# Patient Record
Sex: MALE | Race: WHITE | NOT HISPANIC OR LATINO | Employment: OTHER | URBAN - METROPOLITAN AREA
[De-identification: names, ages, dates, MRNs, and addresses within clinical notes are randomized per-mention and may not be internally consistent; named-entity substitution may affect disease eponyms.]

---

## 2017-03-13 RX ORDER — ALBUTEROL SULFATE 90 UG/1
2 AEROSOL, METERED RESPIRATORY (INHALATION) EVERY 4 HOURS PRN
COMMUNITY

## 2017-03-13 RX ORDER — BUDESONIDE AND FORMOTEROL FUMARATE DIHYDRATE 160; 4.5 UG/1; UG/1
2 AEROSOL RESPIRATORY (INHALATION)
COMMUNITY

## 2017-03-13 RX ORDER — DILTIAZEM HYDROCHLORIDE 120 MG/1
360 CAPSULE, EXTENDED RELEASE ORAL DAILY
COMMUNITY
End: 2017-03-14 | Stop reason: ALTCHOICE

## 2017-03-13 RX ORDER — LISINOPRIL 40 MG/1
40 TABLET ORAL DAILY
COMMUNITY
End: 2020-03-20 | Stop reason: HOSPADM

## 2017-03-14 ENCOUNTER — OFFICE VISIT (OUTPATIENT)
Dept: CARDIOLOGY | Facility: CLINIC | Age: 67
End: 2017-03-14

## 2017-03-14 VITALS
WEIGHT: 233 LBS | HEIGHT: 69 IN | SYSTOLIC BLOOD PRESSURE: 160 MMHG | HEART RATE: 97 BPM | BODY MASS INDEX: 34.51 KG/M2 | DIASTOLIC BLOOD PRESSURE: 72 MMHG

## 2017-03-14 DIAGNOSIS — I35.0 NONRHEUMATIC AORTIC VALVE STENOSIS: Primary | ICD-10-CM

## 2017-03-14 DIAGNOSIS — R07.2 PRECORDIAL PAIN: ICD-10-CM

## 2017-03-14 DIAGNOSIS — E78.5 HYPERLIPIDEMIA, UNSPECIFIED HYPERLIPIDEMIA TYPE: ICD-10-CM

## 2017-03-14 DIAGNOSIS — I10 ESSENTIAL HYPERTENSION: ICD-10-CM

## 2017-03-14 PROCEDURE — 99203 OFFICE O/P NEW LOW 30 MIN: CPT | Performed by: INTERNAL MEDICINE

## 2017-03-14 PROCEDURE — 93000 ELECTROCARDIOGRAM COMPLETE: CPT | Performed by: INTERNAL MEDICINE

## 2017-03-14 RX ORDER — AMLODIPINE BESYLATE 5 MG/1
5 TABLET ORAL DAILY
Qty: 30 TABLET | Refills: 11 | Status: SHIPPED | OUTPATIENT
Start: 2017-03-14 | End: 2020-03-20 | Stop reason: HOSPADM

## 2017-03-14 RX ORDER — FUROSEMIDE 40 MG/1
40 TABLET ORAL DAILY
COMMUNITY
End: 2020-03-25 | Stop reason: SDUPTHER

## 2017-03-14 RX ORDER — METOPROLOL SUCCINATE 25 MG/1
25 TABLET, EXTENDED RELEASE ORAL DAILY
COMMUNITY
End: 2018-05-21 | Stop reason: ALTCHOICE

## 2017-03-14 NOTE — PROGRESS NOTES
" Subjective:        CC  Chest soreness right sided    Sob on mimnimum exerrtion     Fili Ladd is a 66 y.o. male who  is here for the cardiac evaluation has been complaining of the chest tightness right-sided, also complaining of shortness of breath on minimum exertion, is known to have the aortic stenosis, Cardiac risk factors: advanced age (older than 55 for men, 65 for women), diabetes mellitus, dyslipidemia, family history of premature cardiovascular disease, hypertension and obesity (BMI >= 30 kg/m2).    Patient also known to have a benign arterial hypertension in the blood pressure does not seems to be well-controlled    Past Medical History   Diagnosis Date   • Hyperlipidemia    • Hypertension    • Valvular disease     No family history on file.     Review of Systems  Constitutional: No wt loss, fever   Gastrointestinal: No nausea , abdominal pain  Behavioral/Psych: No insomnia or anxiety  Cardiovascular ----positive for chest tightness and shortness of breath. All other systems reviewed and are negative    Objective:       Physical Exam             Physical Exam  Visit Vitals   • /72   • Pulse 97   • Ht 69\" (175.3 cm)   • Wt 233 lb (106 kg)   • BMI 34.41 kg/m2       General appearance: NAD, conversant   Eyes: anicteric sclerae, moist conjunctivae; no lid-lag; PERRLA   HENT: Atraumatic; oropharynx clear with moist mucous membranes and no mucosal ulcerations;  normal hard and soft palate   Neck: Trachea midline; FROM, supple, no thyromegaly or lymphadenopathy   Lungs: CTA, with normal respiratory effort and no intercostal retractions   CV: S1-S2 regular, no murmurs, no rub, no gallop   Abdomen: Soft, non-tender; no masses or HSM   Extremities: No peripheral edema or extremity lymphadenopathy  Skin: Normal temperature, turgor and texture; no rash, ulcers or subcutaneous nodules   Psych: Appropriate affect, alert and oriented to person, place and time         Cardiographics  @  ECG 12 " Lead  Date/Time: 3/14/2017 11:52 AM  Performed by: AYAAN GALLAGHER  Authorized by: AYAAN GALLAGHER   Comparison: not compared with previous ECG   Previous ECG: no previous ECG available  Rhythm: sinus rhythm  Clinical impression: normal ECG            Echocardiogram: not done    Imaging  Chest x-ray: not done     Lab Review   not applicable       Current Outpatient Prescriptions:   •  albuterol (PROVENTIL HFA;VENTOLIN HFA) 108 (90 BASE) MCG/ACT inhaler, Inhale 2 puffs., Disp: , Rfl:   •  budesonide-formoterol (SYMBICORT) 160-4.5 MCG/ACT inhaler, Inhale 2 puffs., Disp: , Rfl:   •  diltiaZEM (TIAZAC) 120 MG 24 hr capsule, Take 360 mg by mouth Daily., Disp: , Rfl:   •  furosemide (LASIX) 40 MG tablet, Take 40 mg by mouth Daily., Disp: , Rfl:   •  lisinopril (PRINIVIL,ZESTRIL) 40 MG tablet, Take 40 mg by mouth Daily., Disp: , Rfl:   •  metFORMIN (GLUCOPHAGE) 500 MG tablet, Take 500 mg by mouth Daily With Breakfast., Disp: , Rfl:   •  metoprolol succinate XL (TOPROL-XL) 25 MG 24 hr tablet, Take 25 mg by mouth Daily., Disp: , Rfl:         Assessment:      Conclusions:  Global left ventricular wall motion and contractility are within normal  limits.  There is normal left ventricular systolic function.  There is an E to A reversal in the mitral valve flow pattern suggestive  of diastolic dysfunction.  Contrast was used to enhance endocardial definition.  There is moderate aortic stenosis.  The peak instantaneous gradient of the aortic valve is 34 mmHg.   The mean gradient of the aortic valve is 20 mmHg.   The aortic valve area, by VTI's, is calculated at 1.14 cm2.   There is trivial to mild mitral regurgitation observed.  There is a trace tricuspid regurgitation.   There is no pericardial effusion.    Patient Active Problem List   Diagnosis   • Aortic valve stenosis   • Chronic obstructive pulmonary disease   • Essential hypertension   • Hyperlipidemia         Plan:       66-year-old white male with known  history of aortic stenosis has been complaining of the atypical chest pain as well as shortness of breath on exertion will need further cardiac evaluation, patient also is known to have the benign arterial hypertension in the blood pressure seems to be not well-controlled      66 with AS, will need repeat echo and lexiscan cardiolyte    See us 1 month    Dc cardiazem    Start NORVASC 5 MG PO DAILY      Counseling was given to Fili Ladd for the following topics:  risk factor reductions, prognosis and risks and benefits of treatment options .       SMOKING COUNSELING:    Counseling given: Not Answered      .  EMR Dragon/Transcription disclaimer:   Much of this encounter note is an electronic transcription/translation of spoken language to printed text. The electronic translation of spoken language may permit erroneous, or at times, nonsensical words or phrases to be inadvertently transcribed; Although I have reviewed the note for such errors, some may still exist.

## 2017-03-22 ENCOUNTER — HOSPITAL ENCOUNTER (OUTPATIENT)
Dept: CARDIOLOGY | Facility: HOSPITAL | Age: 67
Discharge: HOME | End: 2017-03-22
Attending: INTERNAL MEDICINE | Admitting: INTERNAL MEDICINE

## 2017-03-22 VITALS
WEIGHT: 236.4 LBS | HEIGHT: 69 IN | DIASTOLIC BLOOD PRESSURE: 73 MMHG | BODY MASS INDEX: 35.01 KG/M2 | SYSTOLIC BLOOD PRESSURE: 141 MMHG | HEART RATE: 109 BPM

## 2017-03-22 DIAGNOSIS — E78.5 HYPERLIPIDEMIA, UNSPECIFIED HYPERLIPIDEMIA TYPE: ICD-10-CM

## 2017-03-22 DIAGNOSIS — R07.2 PRECORDIAL PAIN: ICD-10-CM

## 2017-03-22 DIAGNOSIS — I35.0 NONRHEUMATIC AORTIC VALVE STENOSIS: ICD-10-CM

## 2017-03-22 DIAGNOSIS — I10 ESSENTIAL HYPERTENSION: ICD-10-CM

## 2017-03-22 PROCEDURE — 93306 TTE W/DOPPLER COMPLETE: CPT

## 2017-03-22 PROCEDURE — 93306 TTE W/DOPPLER COMPLETE: CPT | Performed by: INTERNAL MEDICINE

## 2017-03-23 LAB
BH CV ECHO MEAS - ACS: 0.8 CM
BH CV ECHO MEAS - AO MAX PG (FULL): 13 MMHG
BH CV ECHO MEAS - AO MAX PG: 18.5 MMHG
BH CV ECHO MEAS - AO MEAN PG (FULL): 8 MMHG
BH CV ECHO MEAS - AO MEAN PG: 11 MMHG
BH CV ECHO MEAS - AO ROOT AREA (BSA CORRECTED): 1.6
BH CV ECHO MEAS - AO ROOT AREA: 9.6 CM^2
BH CV ECHO MEAS - AO ROOT DIAM: 3.5 CM
BH CV ECHO MEAS - AO V2 MAX: 215 CM/SEC
BH CV ECHO MEAS - AO V2 MEAN: 156 CM/SEC
BH CV ECHO MEAS - AO V2 VTI: 39.5 CM
BH CV ECHO MEAS - AVA(I,A): 1.9 CM^2
BH CV ECHO MEAS - AVA(I,D): 1.9 CM^2
BH CV ECHO MEAS - AVA(V,A): 2.1 CM^2
BH CV ECHO MEAS - AVA(V,D): 2.1 CM^2
BH CV ECHO MEAS - BSA(HAYCOCK): 2.3 M^2
BH CV ECHO MEAS - BSA: 2.2 M^2
BH CV ECHO MEAS - BZI_BMI: 34.9 KILOGRAMS/M^2
BH CV ECHO MEAS - BZI_DIASTOLIC_PRESSURE: 73 MMHG
BH CV ECHO MEAS - BZI_HEART_RATE: 109 BPM
BH CV ECHO MEAS - BZI_METRIC_HEIGHT: 175.3 CM
BH CV ECHO MEAS - BZI_METRIC_WEIGHT: 107.2 KG
BH CV ECHO MEAS - BZI_SYSTOLIC_PRESSURE: 141 MMHG
BH CV ECHO MEAS - CONTRAST EF 4CH: 55.6 ML/M^2
BH CV ECHO MEAS - EDV(CUBED): 29.8 ML
BH CV ECHO MEAS - EDV(MOD-SP4): 54 ML
BH CV ECHO MEAS - EDV(TEICH): 37.9 ML
BH CV ECHO MEAS - EF(CUBED): 54.2 %
BH CV ECHO MEAS - EF(MOD-SP4): 55.6 %
BH CV ECHO MEAS - EF(TEICH): 47.4 %
BH CV ECHO MEAS - ESV(CUBED): 13.7 ML
BH CV ECHO MEAS - ESV(MOD-SP4): 24 ML
BH CV ECHO MEAS - ESV(TEICH): 20 ML
BH CV ECHO MEAS - FS: 22.9 %
BH CV ECHO MEAS - IVS/LVPW: 1.1
BH CV ECHO MEAS - IVSD: 1.7 CM
BH CV ECHO MEAS - LA DIMENSION: 3 CM
BH CV ECHO MEAS - LA/AO: 0.86
BH CV ECHO MEAS - LAT PEAK E' VEL: 8 CM/SEC
BH CV ECHO MEAS - LV DIASTOLIC VOL/BSA (35-75): 24.4 ML/M^2
BH CV ECHO MEAS - LV MASS(C)D: 183.9 GRAMS
BH CV ECHO MEAS - LV MASS(C)DI: 82.9 GRAMS/M^2
BH CV ECHO MEAS - LV MAX PG: 5.5 MMHG
BH CV ECHO MEAS - LV MEAN PG: 3 MMHG
BH CV ECHO MEAS - LV SYSTOLIC VOL/BSA (12-30): 10.8 ML/M^2
BH CV ECHO MEAS - LV V1 MAX: 117 CM/SEC
BH CV ECHO MEAS - LV V1 MEAN: 77.7 CM/SEC
BH CV ECHO MEAS - LV V1 VTI: 20.1 CM
BH CV ECHO MEAS - LVIDD: 3.1 CM
BH CV ECHO MEAS - LVIDS: 2.4 CM
BH CV ECHO MEAS - LVLD AP4: 7.9 CM
BH CV ECHO MEAS - LVLS AP4: 7 CM
BH CV ECHO MEAS - LVOT AREA (M): 3.8 CM^2
BH CV ECHO MEAS - LVOT AREA: 3.8 CM^2
BH CV ECHO MEAS - LVOT DIAM: 2.2 CM
BH CV ECHO MEAS - LVPWD: 1.5 CM
BH CV ECHO MEAS - MED PEAK E' VEL: 7 CM/SEC
BH CV ECHO MEAS - MR MAX PG: 21.7 MMHG
BH CV ECHO MEAS - MR MAX VEL: 233 CM/SEC
BH CV ECHO MEAS - MV A DUR: 0.12 SEC
BH CV ECHO MEAS - MV A MAX VEL: 80 CM/SEC
BH CV ECHO MEAS - MV DEC SLOPE: 226 CM/SEC^2
BH CV ECHO MEAS - MV DEC TIME: 0.26 SEC
BH CV ECHO MEAS - MV E MAX VEL: 53.8 CM/SEC
BH CV ECHO MEAS - MV E/A: 0.67
BH CV ECHO MEAS - MV MAX PG: 2.7 MMHG
BH CV ECHO MEAS - MV MEAN PG: 1 MMHG
BH CV ECHO MEAS - MV P1/2T MAX VEL: 59.7 CM/SEC
BH CV ECHO MEAS - MV P1/2T: 77.4 MSEC
BH CV ECHO MEAS - MV V2 MAX: 82.7 CM/SEC
BH CV ECHO MEAS - MV V2 MEAN: 43.1 CM/SEC
BH CV ECHO MEAS - MV V2 VTI: 16.1 CM
BH CV ECHO MEAS - MVA P1/2T LCG: 3.7 CM^2
BH CV ECHO MEAS - MVA(P1/2T): 2.8 CM^2
BH CV ECHO MEAS - MVA(VTI): 4.7 CM^2
BH CV ECHO MEAS - PA MAX PG: 2.4 MMHG
BH CV ECHO MEAS - PA MEAN PG: 1 MMHG
BH CV ECHO MEAS - PA V2 MAX: 77.8 CM/SEC
BH CV ECHO MEAS - PA V2 MEAN: 58.5 CM/SEC
BH CV ECHO MEAS - PA V2 VTI: 12.1 CM
BH CV ECHO MEAS - PULM A REVS DUR: 0.11 SEC
BH CV ECHO MEAS - PULM A REVS VEL: 37.7 CM/SEC
BH CV ECHO MEAS - PULM DIAS VEL: 42.9 CM/SEC
BH CV ECHO MEAS - PULM S/D: 1.3
BH CV ECHO MEAS - PULM SYS VEL: 54.6 CM/SEC
BH CV ECHO MEAS - RAP SYSTOLE: 10 MMHG
BH CV ECHO MEAS - RVSP: 62.4 MMHG
BH CV ECHO MEAS - SI(AO): 171.4 ML/M^2
BH CV ECHO MEAS - SI(CUBED): 7.3 ML/M^2
BH CV ECHO MEAS - SI(LVOT): 34.5 ML/M^2
BH CV ECHO MEAS - SI(MOD-SP4): 13.5 ML/M^2
BH CV ECHO MEAS - SI(TEICH): 8.1 ML/M^2
BH CV ECHO MEAS - SV(AO): 380 ML
BH CV ECHO MEAS - SV(CUBED): 16.1 ML
BH CV ECHO MEAS - SV(LVOT): 76.4 ML
BH CV ECHO MEAS - SV(MOD-SP4): 30 ML
BH CV ECHO MEAS - SV(TEICH): 18 ML
BH CV ECHO MEAS - TAPSE (>1.6): 2.1 CM2
BH CV ECHO MEAS - TR MAX VEL: 362 CM/SEC
BH CV XLRA - RV BASE: 2.6 CM
BH CV XLRA - RV LENGTH: 7.7 CM
BH CV XLRA - RV MID: 2.7 CM
E/E' RATIO: 8
LEFT ATRIUM VOLUME INDEX: 15 ML/M2

## 2017-03-29 ENCOUNTER — APPOINTMENT (OUTPATIENT)
Dept: CARDIOLOGY | Facility: HOSPITAL | Age: 67
End: 2017-03-29
Attending: INTERNAL MEDICINE

## 2018-05-21 ENCOUNTER — OFFICE VISIT (OUTPATIENT)
Dept: CARDIOLOGY | Facility: CLINIC | Age: 68
End: 2018-05-21

## 2018-05-21 VITALS
WEIGHT: 230 LBS | BODY MASS INDEX: 34.07 KG/M2 | HEIGHT: 69 IN | SYSTOLIC BLOOD PRESSURE: 165 MMHG | DIASTOLIC BLOOD PRESSURE: 98 MMHG | HEART RATE: 99 BPM

## 2018-05-21 DIAGNOSIS — I10 ESSENTIAL HYPERTENSION: Primary | ICD-10-CM

## 2018-05-21 DIAGNOSIS — I35.0 NONRHEUMATIC AORTIC VALVE STENOSIS: ICD-10-CM

## 2018-05-21 DIAGNOSIS — E78.5 HYPERLIPIDEMIA, UNSPECIFIED HYPERLIPIDEMIA TYPE: ICD-10-CM

## 2018-05-21 PROCEDURE — 99213 OFFICE O/P EST LOW 20 MIN: CPT | Performed by: INTERNAL MEDICINE

## 2018-05-21 PROCEDURE — 93000 ELECTROCARDIOGRAM COMPLETE: CPT | Performed by: INTERNAL MEDICINE

## 2018-05-21 RX ORDER — ATORVASTATIN CALCIUM 20 MG/1
TABLET, FILM COATED ORAL
COMMUNITY
Start: 2018-05-14 | End: 2020-03-07 | Stop reason: HOSPADM

## 2018-05-21 NOTE — PROGRESS NOTES
" Subjective:       Fili Ladd is a 67 y.o. male who here for follow up    CC  Recent hosp at [facility] for sob, copd  HPI  67 his old male with a known history of aortic stenosis, benign essential arterial hypertension and hyperlipidemia , here for the follow-up denies any chest pains or tightness in chest no heaviness with a pressure sensation, patient was recently admitted to the [facility] for shortness of breath due to COPD     Problem List Items Addressed This Visit        Cardiovascular and Mediastinum    Aortic valve stenosis    Essential hypertension - Primary    Relevant Orders    ECG 12 Lead    Hyperlipidemia    Relevant Medications    atorvastatin (LIPITOR) 20 MG tablet        .    The following portions of the patient's history were reviewed and updated as appropriate: allergies, current medications, past family history, past medical history, past social history, past surgical history and problem list.    Past Medical History:   Diagnosis Date   • COPD (chronic obstructive pulmonary disease)    • Hyperlipidemia    • Hypertension    • Valvular disease     reports that he has been smoking.  He has been smoking about 2.00 packs per day. He does not have any smokeless tobacco history on file. He reports that he drinks alcohol. He reports that he does not use drugs.  Family History   Problem Relation Age of Onset   • Arrhythmia Mother    • Heart attack Maternal Grandfather    • Heart attack Paternal Grandfather    • Hypertension Neg Hx    • Hyperlipidemia Neg Hx    • Heart failure Neg Hx    • Heart disease Neg Hx        Review of Systems  Constitutional: No wt loss, fever, fatigue  Gastrointestinal: No nausea, abdominal pain  Behavioral/Psych: No insomnia or anxiety   Cardiovascular Shortness of breath  Objective:       Physical Exam           Physical Exam  /98   Pulse 99   Ht 175.3 cm (69\")   Wt 104 kg (230 lb)   BMI 33.97 kg/m²     General appearance: NAD, conversant   Eyes: anicteric " sclerae, moist conjunctivae; no lid-lag; PERRLA   HENT: Atraumatic; oropharynx clear with moist mucous membranes and no mucosal ulcerations;  normal hard and soft palate   Neck: Trachea midline; FROM, supple, no thyromegaly or lymphadenopathy   Lungs: CTA, with normal respiratory effort and no intercostal retractions   CV: S1-S2 regular, no murmurs, no rub, no gallop   Abdomen: Soft, non-tender; no masses or HSM   Extremities: No peripheral edema or extremity lymphadenopathy  Skin: Normal temperature, turgor and texture; no rash, ulcers or subcutaneous nodules   Psych: Appropriate affect, alert and oriented to person, place and time           Cardiographics  @  ECG 12 Lead  Date/Time: 5/21/2018 12:44 PM  Performed by: AYAAN GALLAGHER  Authorized by: AYAAN GALLAGHER   Comparison: compared with previous ECG   Similar to previous ECG  Rhythm: sinus rhythm  ST Flattening: all  Clinical impression: non-specific ECG            Echocardiogram:        Current Outpatient Prescriptions:   •  albuterol (PROVENTIL HFA;VENTOLIN HFA) 108 (90 BASE) MCG/ACT inhaler, Inhale 2 puffs., Disp: , Rfl:   •  amLODIPine (NORVASC) 5 MG tablet, Take 1 tablet by mouth Daily., Disp: 30 tablet, Rfl: 11  •  atorvastatin (LIPITOR) 20 MG tablet, , Disp: , Rfl:   •  budesonide-formoterol (SYMBICORT) 160-4.5 MCG/ACT inhaler, Inhale 2 puffs., Disp: , Rfl:   •  furosemide (LASIX) 40 MG tablet, Take 40 mg by mouth Daily., Disp: , Rfl:   •  lisinopril (PRINIVIL,ZESTRIL) 40 MG tablet, Take 40 mg by mouth Daily., Disp: , Rfl:   •  metFORMIN (GLUCOPHAGE) 500 MG tablet, Take 500 mg by mouth Daily With Breakfast., Disp: , Rfl:   •  tiotropium (SPIRIVA) 18 MCG per inhalation capsule, Place 1 capsule into inhaler and inhale Daily., Disp: , Rfl:    Assessment:        Patient Active Problem List   Diagnosis   • Aortic valve stenosis   • Chronic obstructive pulmonary disease   • Essential hypertension   • Hyperlipidemia               Plan:             ICD-10-CM ICD-9-CM   1. Essential hypertension I10 401.9   2. Nonrheumatic aortic valve stenosis I35.0 424.1   3. Hyperlipidemia, unspecified hyperlipidemia type E78.5 272.4     1. Essential hypertension  The blood pressures are under control  - ECG 12 Lead    2. Nonrheumatic aortic valve stenosis  Treat conservatively at this stage    3. Hyperlipidemia, unspecified hyperlipidemia type  Counseling has been done     bp running ok at home    See in 1 yr  COUNSELING:    Fili Jhaveri was given to patient for the following topics: diagnostic results, risk factor reductions, impressions, risks and benefits of treatment options and importance of treatment compliance .       SMOKING COUNSELING:    Ready to quit: No  Counseling given: Yes      EMR Dragon/Transcription disclaimer:   Much of this encounter note is an electronic transcription/translation of spoken language to printed text. The electronic translation of spoken language may permit erroneous, or at times, nonsensical words or phrases to be inadvertently transcribed; Although I have reviewed the note for such errors, some may still exist.

## 2020-02-19 ENCOUNTER — OFFICE VISIT (OUTPATIENT)
Dept: CARDIOLOGY | Facility: CLINIC | Age: 70
End: 2020-02-19

## 2020-02-19 ENCOUNTER — HOSPITAL ENCOUNTER (OUTPATIENT)
Dept: CARDIOLOGY | Facility: HOSPITAL | Age: 70
Discharge: HOME | End: 2020-02-19
Admitting: NURSE PRACTITIONER

## 2020-02-19 ENCOUNTER — HOSPITAL ENCOUNTER (OUTPATIENT)
Dept: CARDIOLOGY | Facility: HOSPITAL | Age: 70
Discharge: HOME | End: 2020-02-19

## 2020-02-19 VITALS
BODY MASS INDEX: 35.99 KG/M2 | SYSTOLIC BLOOD PRESSURE: 132 MMHG | DIASTOLIC BLOOD PRESSURE: 78 MMHG | HEART RATE: 113 BPM | WEIGHT: 243 LBS | HEIGHT: 69 IN

## 2020-02-19 DIAGNOSIS — E78.5 HYPERLIPIDEMIA, UNSPECIFIED HYPERLIPIDEMIA TYPE: ICD-10-CM

## 2020-02-19 DIAGNOSIS — I35.0 NONRHEUMATIC AORTIC VALVE STENOSIS: Primary | ICD-10-CM

## 2020-02-19 DIAGNOSIS — I10 ESSENTIAL HYPERTENSION: ICD-10-CM

## 2020-02-19 DIAGNOSIS — R06.02 SHORTNESS OF BREATH: ICD-10-CM

## 2020-02-19 DIAGNOSIS — Z72.0 TOBACCO USE: ICD-10-CM

## 2020-02-19 DIAGNOSIS — M79.89 BILATERAL SWELLING OF FEET: ICD-10-CM

## 2020-02-19 LAB
ALBUMIN SERPL-MCNC: 3.9 G/DL (ref 3.5–5.2)
ALBUMIN/GLOB SERPL: 1.1 G/DL
ALP SERPL-CCNC: 90 U/L (ref 39–117)
ALT SERPL W P-5'-P-CCNC: 23 U/L (ref 1–41)
ANION GAP SERPL CALCULATED.3IONS-SCNC: 14 MMOL/L (ref 5–15)
AORTIC DIMENSIONLESS INDEX: 0.3 (DI)
AST SERPL-CCNC: 17 U/L (ref 1–40)
BH CV ECHO MEAS - ACS: 1 CM
BH CV ECHO MEAS - AO MAX PG (FULL): 50.4 MMHG
BH CV ECHO MEAS - AO MAX PG: 68 MMHG
BH CV ECHO MEAS - AO MEAN PG (FULL): 31 MMHG
BH CV ECHO MEAS - AO MEAN PG: 41 MMHG
BH CV ECHO MEAS - AO ROOT AREA (BSA CORRECTED): 1.6
BH CV ECHO MEAS - AO ROOT AREA: 9.6 CM^2
BH CV ECHO MEAS - AO ROOT DIAM: 3.5 CM
BH CV ECHO MEAS - AO V2 MAX: 411 CM/SEC
BH CV ECHO MEAS - AO V2 MEAN: 273 CM/SEC
BH CV ECHO MEAS - AO V2 VTI: 70 CM
BH CV ECHO MEAS - ASC AORTA: 3.2 CM
BH CV ECHO MEAS - AVA(I,A): 0.89 CM^2
BH CV ECHO MEAS - AVA(I,D): 0.89 CM^2
BH CV ECHO MEAS - AVA(V,A): 0.83 CM^2
BH CV ECHO MEAS - AVA(V,D): 0.83 CM^2
BH CV ECHO MEAS - BSA(HAYCOCK): 2.4 M^2
BH CV ECHO MEAS - BSA: 2.2 M^2
BH CV ECHO MEAS - BZI_BMI: 35.9 KILOGRAMS/M^2
BH CV ECHO MEAS - BZI_METRIC_HEIGHT: 175.3 CM
BH CV ECHO MEAS - BZI_METRIC_WEIGHT: 110.2 KG
BH CV ECHO MEAS - EDV(CUBED): 103.8 ML
BH CV ECHO MEAS - EDV(MOD-SP2): 63 ML
BH CV ECHO MEAS - EDV(MOD-SP4): 64 ML
BH CV ECHO MEAS - EDV(TEICH): 102.4 ML
BH CV ECHO MEAS - EF(CUBED): 78.9 %
BH CV ECHO MEAS - EF(MOD-BP): 64 %
BH CV ECHO MEAS - EF(MOD-SP2): 61.9 %
BH CV ECHO MEAS - EF(MOD-SP4): 65.6 %
BH CV ECHO MEAS - EF(TEICH): 71.1 %
BH CV ECHO MEAS - ESV(CUBED): 22 ML
BH CV ECHO MEAS - ESV(MOD-SP2): 24 ML
BH CV ECHO MEAS - ESV(MOD-SP4): 22 ML
BH CV ECHO MEAS - ESV(TEICH): 29.6 ML
BH CV ECHO MEAS - FS: 40.4 %
BH CV ECHO MEAS - IVS/LVPW: 1.2
BH CV ECHO MEAS - IVSD: 1.5 CM
BH CV ECHO MEAS - LAT PEAK E' VEL: 11.7 CM/SEC
BH CV ECHO MEAS - LV DIASTOLIC VOL/BSA (35-75): 28.5 ML/M^2
BH CV ECHO MEAS - LV MASS(C)D: 265.2 GRAMS
BH CV ECHO MEAS - LV MASS(C)DI: 118.2 GRAMS/M^2
BH CV ECHO MEAS - LV MAX PG: 3.7 MMHG
BH CV ECHO MEAS - LV MEAN PG: 2 MMHG
BH CV ECHO MEAS - LV SYSTOLIC VOL/BSA (12-30): 9.8 ML/M^2
BH CV ECHO MEAS - LV V1 MAX: 96.8 CM/SEC
BH CV ECHO MEAS - LV V1 MEAN: 71.3 CM/SEC
BH CV ECHO MEAS - LV V1 VTI: 20.5 CM
BH CV ECHO MEAS - LVIDD: 4.7 CM
BH CV ECHO MEAS - LVIDS: 2.8 CM
BH CV ECHO MEAS - LVLD AP2: 7.2 CM
BH CV ECHO MEAS - LVLD AP4: 7.1 CM
BH CV ECHO MEAS - LVLS AP2: 6.1 CM
BH CV ECHO MEAS - LVLS AP4: 6.5 CM
BH CV ECHO MEAS - LVOT AREA (M): 3.1 CM^2
BH CV ECHO MEAS - LVOT AREA: 3.1 CM^2
BH CV ECHO MEAS - LVOT DIAM: 2 CM
BH CV ECHO MEAS - LVPWD: 1.3 CM
BH CV ECHO MEAS - MED PEAK E' VEL: 10.4 CM/SEC
BH CV ECHO MEAS - MV DEC SLOPE: 481 CM/SEC^2
BH CV ECHO MEAS - MV DEC TIME: 0.11 SEC
BH CV ECHO MEAS - MV E MAX VEL: 142 CM/SEC
BH CV ECHO MEAS - MV MAX PG: 5.2 MMHG
BH CV ECHO MEAS - MV MEAN PG: 3 MMHG
BH CV ECHO MEAS - MV P1/2T MAX VEL: 110 CM/SEC
BH CV ECHO MEAS - MV P1/2T: 67 MSEC
BH CV ECHO MEAS - MV V2 MAX: 114 CM/SEC
BH CV ECHO MEAS - MV V2 MEAN: 75.2 CM/SEC
BH CV ECHO MEAS - MV V2 VTI: 27.4 CM
BH CV ECHO MEAS - MVA P1/2T LCG: 2 CM^2
BH CV ECHO MEAS - MVA(P1/2T): 3.3 CM^2
BH CV ECHO MEAS - MVA(VTI): 2.4 CM^2
BH CV ECHO MEAS - PA ACC TIME: 0.1 SEC
BH CV ECHO MEAS - PA MAX PG (FULL): 2.4 MMHG
BH CV ECHO MEAS - PA MAX PG: 4.2 MMHG
BH CV ECHO MEAS - PA PR(ACCEL): 34 MMHG
BH CV ECHO MEAS - PA V2 MAX: 102 CM/SEC
BH CV ECHO MEAS - PVA(V,A): 3 CM^2
BH CV ECHO MEAS - PVA(V,D): 3 CM^2
BH CV ECHO MEAS - QP/QS: 0.78
BH CV ECHO MEAS - RAP SYSTOLE: 15 MMHG
BH CV ECHO MEAS - RV MAX PG: 1.8 MMHG
BH CV ECHO MEAS - RV MEAN PG: 1 MMHG
BH CV ECHO MEAS - RV V1 MAX: 67.2 CM/SEC
BH CV ECHO MEAS - RV V1 MEAN: 40.3 CM/SEC
BH CV ECHO MEAS - RV V1 VTI: 11.1 CM
BH CV ECHO MEAS - RVOT AREA: 4.5 CM^2
BH CV ECHO MEAS - RVOT DIAM: 2.4 CM
BH CV ECHO MEAS - RVSP: 57 MMHG
BH CV ECHO MEAS - SI(AO): 309.1 ML/M^2
BH CV ECHO MEAS - SI(CUBED): 36.5 ML/M^2
BH CV ECHO MEAS - SI(LVOT): 28.7 ML/M^2
BH CV ECHO MEAS - SI(MOD-SP2): 17.4 ML/M^2
BH CV ECHO MEAS - SI(MOD-SP4): 18.7 ML/M^2
BH CV ECHO MEAS - SI(TEICH): 32.4 ML/M^2
BH CV ECHO MEAS - SV(AO): 693.7 ML
BH CV ECHO MEAS - SV(CUBED): 81.9 ML
BH CV ECHO MEAS - SV(LVOT): 64.4 ML
BH CV ECHO MEAS - SV(MOD-SP2): 39 ML
BH CV ECHO MEAS - SV(MOD-SP4): 42 ML
BH CV ECHO MEAS - SV(RVOT): 50.2 ML
BH CV ECHO MEAS - SV(TEICH): 72.8 ML
BH CV ECHO MEAS - TAPSE (>1.6): 2.5 CM
BH CV ECHO MEAS - TR MAX VEL: 324 CM/SEC
BH CV ECHO MEASUREMENTS AVERAGE E/E' RATIO: 12.85
BH CV XLRA - RV BASE: 3.3 CM
BH CV XLRA - RV LENGTH: 8 CM
BH CV XLRA - RV MID: 2.1 CM
BH CV XLRA - TDI S': 13.1 CM/SEC
BILIRUB SERPL-MCNC: 0.5 MG/DL (ref 0.2–1.2)
BUN BLD-MCNC: 18 MG/DL (ref 8–23)
BUN/CREAT SERPL: 19.4 (ref 7–25)
CALCIUM SPEC-SCNC: 10.1 MG/DL (ref 8.6–10.5)
CHLORIDE SERPL-SCNC: 88 MMOL/L (ref 98–107)
CO2 SERPL-SCNC: 34 MMOL/L (ref 22–29)
CREAT BLD-MCNC: 0.93 MG/DL (ref 0.76–1.27)
GFR SERPL CREATININE-BSD FRML MDRD: 81 ML/MIN/1.73
GLOBULIN UR ELPH-MCNC: 3.6 GM/DL
GLUCOSE BLD-MCNC: 172 MG/DL (ref 65–99)
LEFT ATRIUM VOLUME INDEX: 22 ML/M2
MAXIMAL PREDICTED HEART RATE: 152 BPM
POTASSIUM BLD-SCNC: 3.9 MMOL/L (ref 3.5–5.2)
PROT SERPL-MCNC: 7.5 G/DL (ref 6–8.5)
SODIUM BLD-SCNC: 136 MMOL/L (ref 136–145)
STRESS TARGET HR: 129 BPM

## 2020-02-19 PROCEDURE — 93306 TTE W/DOPPLER COMPLETE: CPT

## 2020-02-19 PROCEDURE — 99214 OFFICE O/P EST MOD 30 MIN: CPT | Performed by: NURSE PRACTITIONER

## 2020-02-19 PROCEDURE — 36415 COLL VENOUS BLD VENIPUNCTURE: CPT | Performed by: NURSE PRACTITIONER

## 2020-02-19 PROCEDURE — 80053 COMPREHEN METABOLIC PANEL: CPT | Performed by: NURSE PRACTITIONER

## 2020-02-19 PROCEDURE — 93000 ELECTROCARDIOGRAM COMPLETE: CPT | Performed by: NURSE PRACTITIONER

## 2020-02-19 PROCEDURE — 93306 TTE W/DOPPLER COMPLETE: CPT | Performed by: INTERNAL MEDICINE

## 2020-02-19 RX ORDER — CLOPIDOGREL BISULFATE 75 MG/1
75 TABLET ORAL DAILY
COMMUNITY
Start: 2020-01-20 | End: 2020-03-25 | Stop reason: SDUPTHER

## 2020-02-19 RX ORDER — ASPIRIN 81 MG/1
81 TABLET ORAL DAILY
COMMUNITY
End: 2020-03-20 | Stop reason: HOSPADM

## 2020-02-19 RX ORDER — ROPINIROLE 4 MG/1
4 TABLET, FILM COATED ORAL NIGHTLY
COMMUNITY
Start: 2020-02-11

## 2020-02-19 RX ORDER — PROMETHAZINE HYDROCHLORIDE 6.25 MG/5ML
SYRUP ORAL 4 TIMES DAILY PRN
Status: ON HOLD | COMMUNITY
End: 2020-03-04

## 2020-02-19 NOTE — PROGRESS NOTES
Subjective:        Fili Ladd is a 69 y.o. male who here for follow up    Chief Complaint   Patient presents with   • Edema     ANKLES AND FEET SWELLING   • Shortness of Breath       HPI  Fili Ladd is a 69-year-old male, who is to me.   He has a history of essential hypertension, nonrheumatic aortic valve stenosis, hyperlipidemia, and COPD.  He presents today to the office with shortness of breath and BLE swelling 2-3+ edema.  States he called Dr. Soto's office because he had been seen by them in the past and they could not get him in and 8 advised him to go see his primary care physician.  He then went to see his primary care physician Dr. Michel and was told to take his Lasix 40 mg twice daily.  He states he has been diuresing a lot and the swelling has went down and is much better.    His previous echo showed EF 55.6%, mild LVH, diastolic dysfunction grade 1, calcification present within the aortic valve, mild aortic valve stenosis, mild MV and TV regurgitation, and no evidence of pericardial effusion.     States he has shortness of breath and BLE swelling with improvement.  Denies chest pain and palpitations.    The following portions of the patient's history were reviewed and updated as appropriate: allergies, current medications, past family history, past medical history, past social history, past surgical history and problem list.    Past Medical History:   Diagnosis Date   • COPD (chronic obstructive pulmonary disease) (CMS/[facility])    • Hyperlipidemia    • Hypertension    • Valvular disease          reports that he has been smoking. He has been smoking about 2.00 packs per day. He has never used smokeless tobacco. He reports that he drinks alcohol. He reports that he does not use drugs.     Family History   Problem Relation Age of Onset   • Arrhythmia Mother    • Heart attack Maternal Grandfather    • Heart attack Paternal Grandfather    • Hypertension Neg Hx    • Hyperlipidemia Neg Hx    • Heart  failure Neg Hx    • Heart disease Neg Hx        ROS     Review of Systems  Constitutional: Some weight loss due to diuresing, + fatigue. denies fever    Gastrointestinal: No nausea or abdominal pain   Behavioral/Psych: No insomnia or anxiety   Cardiovascular: Denies chest pain, and palpitations.  Positive shortness of breath.      Objective:           Physical Exam   Constitutional: He is oriented to person, place, and time. He appears well-developed and well-nourished.   HENT:   Head: Normocephalic.   Right Ear: External ear normal.   Left Ear: External ear normal.   Eyes: EOM are normal.   Neck: Normal range of motion. No JVD present.   Cardiovascular: Normal rate, regular rhythm, normal heart sounds and intact distal pulses. Exam reveals no gallop and no friction rub.   No murmur heard.  Pulmonary/Chest: Effort normal. No stridor. No respiratory distress. He has rales.   Abdominal: Soft. Bowel sounds are normal. He exhibits no distension. There is no tenderness. There is no guarding.   Musculoskeletal: Normal range of motion. He exhibits edema. He exhibits no tenderness.   BLE 2-3+   Neurological: He is alert and oriented to person, place, and time. He has normal reflexes.   Skin: Skin is warm.   Psychiatric: He has a normal mood and affect. Judgment normal.   Nursing note and vitals reviewed.        ECG 12 Lead  Date/Time: 2/19/2020 11:19 AM  Performed by: Princess Fishman APRN  Authorized by: Princess Fishman APRN   Comparison: compared with previous ECG from 5/21/2018  Rhythm: sinus tachycardia            Interpretation Summary     · Left ventricular wall thickness is consistent with mild concentric hypertrophy.  · Left ventricular diastolic dysfunction (grade I) consistent with impaired relaxation.  · There is calcification present within the aortic valve.  · Mild aortic valve stenosis is present.  · Mild mitral valve regurgitation is present  · Mild tricuspid valve regurgitation is present.  · Left  Ventricle: Calculated EF = 55.6%.  · There is no evidence of pericardial effusion.                 Current Outpatient Medications:   •  albuterol (PROVENTIL HFA;VENTOLIN HFA) 108 (90 BASE) MCG/ACT inhaler, Inhale 2 puffs., Disp: , Rfl:   •  amLODIPine (NORVASC) 5 MG tablet, Take 1 tablet by mouth Daily., Disp: 30 tablet, Rfl: 11  •  aspirin 81 MG EC tablet, Take 81 mg by mouth Daily., Disp: , Rfl:   •  budesonide-formoterol (SYMBICORT) 160-4.5 MCG/ACT inhaler, Inhale 2 puffs., Disp: , Rfl:   •  clopidogrel (PLAVIX) 75 MG tablet, , Disp: , Rfl:   •  furosemide (LASIX) 40 MG tablet, Take 40 mg by mouth Daily., Disp: , Rfl:   •  lisinopril (PRINIVIL,ZESTRIL) 40 MG tablet, Take 40 mg by mouth Daily., Disp: , Rfl:   •  promethazine (PHENERGAN) 6.25 MG/5ML syrup, Take  by mouth 4 (Four) Times a Day As Needed for Nausea or Vomiting., Disp: , Rfl:   •  rOPINIRole (REQUIP) 4 MG tablet, , Disp: , Rfl:   •  tiotropium (SPIRIVA) 18 MCG per inhalation capsule, Place 1 capsule into inhaler and inhale Daily., Disp: , Rfl:   •  atorvastatin (LIPITOR) 20 MG tablet, , Disp: , Rfl:   •  metFORMIN (GLUCOPHAGE) 500 MG tablet, Take 500 mg by mouth Daily With Breakfast., Disp: , Rfl:      Assessment:        Patient Active Problem List   Diagnosis   • Aortic valve stenosis   • Chronic obstructive pulmonary disease (CMS/HCC)   • Essential hypertension   • Hyperlipidemia               Plan:   1.  New problem shortness of breath rales noted on exam and BLE swelling.  He recently went to his primary care physician and he instructed him to take his Lasix 40 mg twice daily.  We will do CMP and echo.  He will follow-up with Dr. Ridley for results.  We will do an echo previous echo as above.    2.  New problem BLE swelling: As above    3. Tobacco abuse: 3 min of counseling completed    Fili Ladd has been explained that tobacco abuse is extremely harmful to the body including to the cardiovascular, it significantly increases the risk of  atherosclerosis, myocardial infarction, strokes and peripheral vascular disease.  Strongly advised to stop tobacco abuse  Secondhand smoking also has been explained    4.  Aortic stenosis: Previous echo as above.  Will do echo.    5.  Essential hypertension: During the office blood pressure is controlled on current medication ECG showed sinus tachycardia, similar to previous ECG.    Educated patient on exercising for at least 30 minutes a day for 2 to 3 days a week. Importance of controlling hypertension and blood pressure checkup on the regular basis has been explained. Hypertension as a silent killer has been discussed. Risk reduction of the weight and regular exercises to control the hypertension has been explained.    6.  Hyperlipidemia: His PCP does his labs and controls his ch lustral medication.      Risk of the hyperlipidemia, importance of the treatment has been explained. Pros and cons of the statins has been explained. Regular blood workup as well as side effects including the liver failure, myelopathy death has been explained.       No diagnosis found.    There are no diagnoses linked to this encounter.    COUNSELING:    Fili Jhaveri was given to patient for the following topics: diagnostic results, risk factor reductions, impressions, risks and benefits of treatment options and importance of treatment compliance .       SMOKING COUNSELING:  I was going to send him to the ED however he refused.  He has agreed to follow-up with Dr. Ridley with results of echo and CMP.       Sincerely,   PHONG Perez  [facility]  02/19/20  11:02 AM     EMR Dragon/Transcription disclaimer:   Much of this encounter note is an electronic transcription/translation of spoken language to printed text. The electronic translation of spoken language may permit erroneous, or at times, nonsensical words or phrases to be inadvertently transcribed; Although I have reviewed the note for  such errors, some may still exist.

## 2020-02-27 ENCOUNTER — HOSPITAL ENCOUNTER (OUTPATIENT)
Dept: CARDIOLOGY | Facility: HOSPITAL | Age: 70
Discharge: HOME | End: 2020-02-27
Admitting: INTERNAL MEDICINE

## 2020-02-27 ENCOUNTER — OFFICE VISIT (OUTPATIENT)
Dept: CARDIOLOGY | Facility: CLINIC | Age: 70
End: 2020-02-27

## 2020-02-27 VITALS
SYSTOLIC BLOOD PRESSURE: 129 MMHG | BODY MASS INDEX: 36.58 KG/M2 | WEIGHT: 247 LBS | HEIGHT: 69 IN | HEART RATE: 73 BPM | DIASTOLIC BLOOD PRESSURE: 77 MMHG

## 2020-02-27 DIAGNOSIS — I35.0 NONRHEUMATIC AORTIC VALVE STENOSIS: Primary | ICD-10-CM

## 2020-02-27 DIAGNOSIS — E66.3 OVERWEIGHT: ICD-10-CM

## 2020-02-27 DIAGNOSIS — R06.02 SOB (SHORTNESS OF BREATH): ICD-10-CM

## 2020-02-27 LAB
ANION GAP SERPL CALCULATED.3IONS-SCNC: 12.1 MMOL/L (ref 5–15)
APTT PPP: 36.5 SECONDS (ref 22.7–35.4)
BASOPHILS # BLD AUTO: 0.03 10*3/MM3 (ref 0–0.2)
BASOPHILS NFR BLD AUTO: 0.4 % (ref 0–1.5)
BUN BLD-MCNC: 16 MG/DL (ref 8–23)
BUN/CREAT SERPL: 15.4 (ref 7–25)
CALCIUM SPEC-SCNC: 9.9 MG/DL (ref 8.6–10.5)
CHLORIDE SERPL-SCNC: 90 MMOL/L (ref 98–107)
CO2 SERPL-SCNC: 31.9 MMOL/L (ref 22–29)
CREAT BLD-MCNC: 1.04 MG/DL (ref 0.76–1.27)
DEPRECATED RDW RBC AUTO: 39.1 FL (ref 37–54)
EOSINOPHIL # BLD AUTO: 0.49 10*3/MM3 (ref 0–0.4)
EOSINOPHIL NFR BLD AUTO: 6.7 % (ref 0.3–6.2)
ERYTHROCYTE [DISTWIDTH] IN BLOOD BY AUTOMATED COUNT: 12.8 % (ref 12.3–15.4)
GFR SERPL CREATININE-BSD FRML MDRD: 71 ML/MIN/1.73
GLUCOSE BLD-MCNC: 204 MG/DL (ref 65–99)
HCT VFR BLD AUTO: 44.1 % (ref 37.5–51)
HGB BLD-MCNC: 15.1 G/DL (ref 13–17.7)
IMM GRANULOCYTES # BLD AUTO: 0.03 10*3/MM3 (ref 0–0.05)
IMM GRANULOCYTES NFR BLD AUTO: 0.4 % (ref 0–0.5)
INR PPP: 1 (ref 0.9–1.1)
LYMPHOCYTES # BLD AUTO: 1.05 10*3/MM3 (ref 0.7–3.1)
LYMPHOCYTES NFR BLD AUTO: 14.4 % (ref 19.6–45.3)
MCH RBC QN AUTO: 29.3 PG (ref 26.6–33)
MCHC RBC AUTO-ENTMCNC: 34.2 G/DL (ref 31.5–35.7)
MCV RBC AUTO: 85.6 FL (ref 79–97)
MONOCYTES # BLD AUTO: 0.84 10*3/MM3 (ref 0.1–0.9)
MONOCYTES NFR BLD AUTO: 11.5 % (ref 5–12)
NEUTROPHILS # BLD AUTO: 4.85 10*3/MM3 (ref 1.7–7)
NEUTROPHILS NFR BLD AUTO: 66.6 % (ref 42.7–76)
NRBC BLD AUTO-RTO: 0 /100 WBC (ref 0–0.2)
PLATELET # BLD AUTO: 235 10*3/MM3 (ref 140–450)
PMV BLD AUTO: 9.9 FL (ref 6–12)
POTASSIUM BLD-SCNC: 4.1 MMOL/L (ref 3.5–5.2)
PROTHROMBIN TIME: 12.9 SECONDS (ref 11.7–14.2)
RBC # BLD AUTO: 5.15 10*6/MM3 (ref 4.14–5.8)
SODIUM BLD-SCNC: 134 MMOL/L (ref 136–145)
WBC NRBC COR # BLD: 7.29 10*3/MM3 (ref 3.4–10.8)

## 2020-02-27 PROCEDURE — 85025 COMPLETE CBC W/AUTO DIFF WBC: CPT | Performed by: INTERNAL MEDICINE

## 2020-02-27 PROCEDURE — 85730 THROMBOPLASTIN TIME PARTIAL: CPT | Performed by: INTERNAL MEDICINE

## 2020-02-27 PROCEDURE — 85610 PROTHROMBIN TIME: CPT | Performed by: INTERNAL MEDICINE

## 2020-02-27 PROCEDURE — 99213 OFFICE O/P EST LOW 20 MIN: CPT | Performed by: INTERNAL MEDICINE

## 2020-02-27 PROCEDURE — 80048 BASIC METABOLIC PNL TOTAL CA: CPT | Performed by: INTERNAL MEDICINE

## 2020-02-27 PROCEDURE — 36415 COLL VENOUS BLD VENIPUNCTURE: CPT | Performed by: INTERNAL MEDICINE

## 2020-03-04 ENCOUNTER — HOSPITAL ENCOUNTER (INPATIENT)
Facility: HOSPITAL | Age: 70
LOS: 3 days | Discharge: HOME | End: 2020-03-07
Attending: INTERNAL MEDICINE | Admitting: INTERNAL MEDICINE

## 2020-03-04 ENCOUNTER — APPOINTMENT (OUTPATIENT)
Dept: CARDIOLOGY | Facility: HOSPITAL | Age: 70
End: 2020-03-04

## 2020-03-04 ENCOUNTER — APPOINTMENT (OUTPATIENT)
Dept: GENERAL RADIOLOGY | Facility: HOSPITAL | Age: 70
End: 2020-03-04

## 2020-03-04 DIAGNOSIS — R06.02 SOB (SHORTNESS OF BREATH): ICD-10-CM

## 2020-03-04 DIAGNOSIS — I35.0 NONRHEUMATIC AORTIC VALVE STENOSIS: Primary | ICD-10-CM

## 2020-03-04 DIAGNOSIS — J44.9 CHRONIC OBSTRUCTIVE PULMONARY DISEASE, UNSPECIFIED COPD TYPE (HCC): ICD-10-CM

## 2020-03-04 LAB
ARTERIAL PATENCY WRIST A: POSITIVE
ATMOSPHERIC PRESS: 751.9 MMHG
BACTERIA UR QL AUTO: NORMAL /HPF
BASE EXCESS BLDA CALC-SCNC: 6.6 MMOL/L (ref 0–2)
BDY SITE: ABNORMAL
BH CV XLRA MEAS - DIST GSV CALF DIST LEFT: 0.28 CM
BH CV XLRA MEAS - DIST GSV CALF DIST RIGHT: 0.34 CM
BH CV XLRA MEAS - DIST GSV THIGH DIST LEFT: 0.37 CM
BH CV XLRA MEAS - DIST GSV THIGH DIST RIGHT: 0.4 CM
BH CV XLRA MEAS - GSV ANKLE DIST LEFT: 0.32 CM
BH CV XLRA MEAS - GSV ANKLE DIST RIGHT: 0.34 CM
BH CV XLRA MEAS - GSV KNEE DIST LEFT: 0.32 CM
BH CV XLRA MEAS - GSV KNEE DIST RIGHT: 0.38 CM
BH CV XLRA MEAS - GSV ORIGIN DIST LEFT: 0.64 CM
BH CV XLRA MEAS - GSV ORIGIN DIST RIGHT: 0.7 CM
BH CV XLRA MEAS - MID GSV CALF RIGHT: 0.28 CM
BH CV XLRA MEAS - MID GSV THIGH  LEFT: 0.33 CM
BH CV XLRA MEAS - MID GSV THIGH  RIGHT: 0.43 CM
BH CV XLRA MEAS - PROX GSV CALF DIST LEFT: 0.29 CM
BH CV XLRA MEAS - PROX GSV CALF DIST RIGHT: 0.3 CM
BH CV XLRA MEAS - PROX GSV THIGH  LEFT: 0.42 CM
BH CV XLRA MEAS - PROX GSV THIGH  RIGHT: 0.48 CM
BH CV XLRA MEAS LEFT DIST CCA EDV: -7.9 CM/SEC
BH CV XLRA MEAS LEFT DIST CCA PSV: -45.6 CM/SEC
BH CV XLRA MEAS LEFT DIST ICA EDV: -11.4 CM/SEC
BH CV XLRA MEAS LEFT DIST ICA PSV: -35 CM/SEC
BH CV XLRA MEAS LEFT ICA/CCA RATIO: 2.13
BH CV XLRA MEAS LEFT MID ICA EDV: -21.9 CM/SEC
BH CV XLRA MEAS LEFT MID ICA PSV: -64.3 CM/SEC
BH CV XLRA MEAS LEFT PROX CCA EDV: -11.4 CM/SEC
BH CV XLRA MEAS LEFT PROX CCA PSV: -54.6 CM/SEC
BH CV XLRA MEAS LEFT PROX ECA EDV: 11.7 CM/SEC
BH CV XLRA MEAS LEFT PROX ECA PSV: 97.3 CM/SEC
BH CV XLRA MEAS LEFT PROX ICA EDV: 26.4 CM/SEC
BH CV XLRA MEAS LEFT PROX ICA PSV: 97.3 CM/SEC
BH CV XLRA MEAS LEFT PROX SCLA PSV: 79.7 CM/SEC
BH CV XLRA MEAS LEFT VERTEBRAL A EDV: -8.3 CM/SEC
BH CV XLRA MEAS LEFT VERTEBRAL A PSV: -22.2 CM/SEC
BH CV XLRA MEAS RIGHT DIST CCA EDV: -12.6 CM/SEC
BH CV XLRA MEAS RIGHT DIST CCA PSV: -55.8 CM/SEC
BH CV XLRA MEAS RIGHT DIST ICA EDV: 17.7 CM/SEC
BH CV XLRA MEAS RIGHT DIST ICA PSV: 55 CM/SEC
BH CV XLRA MEAS RIGHT ICA/CCA RATIO: 1.91
BH CV XLRA MEAS RIGHT MID ICA EDV: 16.7 CM/SEC
BH CV XLRA MEAS RIGHT MID ICA PSV: 50 CM/SEC
BH CV XLRA MEAS RIGHT PROX CCA EDV: -12.9 CM/SEC
BH CV XLRA MEAS RIGHT PROX CCA PSV: -67.4 CM/SEC
BH CV XLRA MEAS RIGHT PROX ECA EDV: 5.9 CM/SEC
BH CV XLRA MEAS RIGHT PROX ECA PSV: 80.9 CM/SEC
BH CV XLRA MEAS RIGHT PROX ICA EDV: -29.3 CM/SEC
BH CV XLRA MEAS RIGHT PROX ICA PSV: -107 CM/SEC
BH CV XLRA MEAS RIGHT PROX SCLA PSV: 104 CM/SEC
BH CV XLRA MEAS RIGHT VERTEBRAL A EDV: 7.1 CM/SEC
BH CV XLRA MEAS RIGHT VERTEBRAL A PSV: 29.1 CM/SEC
BILIRUB UR QL STRIP: NEGATIVE
CLARITY UR: CLEAR
COLOR UR: YELLOW
GAS FLOW AIRWAY: 3 LPM
GLUCOSE BLDC GLUCOMTR-MCNC: 125 MG/DL (ref 70–130)
GLUCOSE BLDC GLUCOMTR-MCNC: 133 MG/DL (ref 70–130)
GLUCOSE BLDC GLUCOMTR-MCNC: 232 MG/DL (ref 70–130)
GLUCOSE UR STRIP-MCNC: NEGATIVE MG/DL
HCO3 BLDA-SCNC: 33.9 MMOL/L (ref 22–28)
HGB UR QL STRIP.AUTO: NEGATIVE
HYALINE CASTS UR QL AUTO: NORMAL /LPF
KETONES UR QL STRIP: NEGATIVE
LEFT ARM BP: NORMAL MMHG
LEUKOCYTE ESTERASE UR QL STRIP.AUTO: NEGATIVE
MODALITY: ABNORMAL
NITRITE UR QL STRIP: NEGATIVE
PCO2 BLDA: 57.2 MM HG (ref 35–45)
PH BLDA: 7.38 PH UNITS (ref 7.35–7.45)
PH UR STRIP.AUTO: 6 [PH] (ref 5–8)
PO2 BLDA: 73.4 MM HG (ref 80–100)
PROT UR QL STRIP: ABNORMAL
RBC # UR: NORMAL /HPF
REF LAB TEST METHOD: NORMAL
SAO2 % BLDCOA: 93.8 % (ref 92–99)
SP GR UR STRIP: 1.03 (ref 1–1.03)
SQUAMOUS #/AREA URNS HPF: NORMAL /HPF
TOTAL RATE: 20 BREATHS/MINUTE
UROBILINOGEN UR QL STRIP: ABNORMAL
WBC UR QL AUTO: NORMAL /HPF

## 2020-03-04 PROCEDURE — 93460 R&L HRT ART/VENTRICLE ANGIO: CPT | Performed by: INTERNAL MEDICINE

## 2020-03-04 PROCEDURE — 85018 HEMOGLOBIN: CPT

## 2020-03-04 PROCEDURE — 99153 MOD SED SAME PHYS/QHP EA: CPT | Performed by: INTERNAL MEDICINE

## 2020-03-04 PROCEDURE — C1894 INTRO/SHEATH, NON-LASER: HCPCS | Performed by: INTERNAL MEDICINE

## 2020-03-04 PROCEDURE — 36600 WITHDRAWAL OF ARTERIAL BLOOD: CPT

## 2020-03-04 PROCEDURE — 85014 HEMATOCRIT: CPT

## 2020-03-04 PROCEDURE — C1769 GUIDE WIRE: HCPCS | Performed by: INTERNAL MEDICINE

## 2020-03-04 PROCEDURE — 93970 EXTREMITY STUDY: CPT

## 2020-03-04 PROCEDURE — B2111ZZ FLUOROSCOPY OF MULTIPLE CORONARY ARTERIES USING LOW OSMOLAR CONTRAST: ICD-10-PCS | Performed by: INTERNAL MEDICINE

## 2020-03-04 PROCEDURE — 99152 MOD SED SAME PHYS/QHP 5/>YRS: CPT | Performed by: INTERNAL MEDICINE

## 2020-03-04 PROCEDURE — 25010000002 FENTANYL CITRATE (PF) 100 MCG/2ML SOLUTION: Performed by: INTERNAL MEDICINE

## 2020-03-04 PROCEDURE — 0 IOPAMIDOL PER 1 ML: Performed by: INTERNAL MEDICINE

## 2020-03-04 PROCEDURE — 71046 X-RAY EXAM CHEST 2 VIEWS: CPT

## 2020-03-04 PROCEDURE — 82962 GLUCOSE BLOOD TEST: CPT

## 2020-03-04 PROCEDURE — 94799 UNLISTED PULMONARY SVC/PX: CPT

## 2020-03-04 PROCEDURE — 94640 AIRWAY INHALATION TREATMENT: CPT

## 2020-03-04 PROCEDURE — B2151ZZ FLUOROSCOPY OF LEFT HEART USING LOW OSMOLAR CONTRAST: ICD-10-PCS | Performed by: INTERNAL MEDICINE

## 2020-03-04 PROCEDURE — 81001 URINALYSIS AUTO W/SCOPE: CPT | Performed by: NURSE PRACTITIONER

## 2020-03-04 PROCEDURE — 93880 EXTRACRANIAL BILAT STUDY: CPT

## 2020-03-04 PROCEDURE — 4A023N8 MEASUREMENT OF CARDIAC SAMPLING AND PRESSURE, BILATERAL, PERCUTANEOUS APPROACH: ICD-10-PCS | Performed by: INTERNAL MEDICINE

## 2020-03-04 PROCEDURE — 82803 BLOOD GASES ANY COMBINATION: CPT

## 2020-03-04 PROCEDURE — 25010000002 MIDAZOLAM PER 1 MG: Performed by: INTERNAL MEDICINE

## 2020-03-04 RX ORDER — CHLORHEXIDINE GLUCONATE 0.12 MG/ML
15 RINSE ORAL EVERY 12 HOURS SCHEDULED
Status: DISCONTINUED | OUTPATIENT
Start: 2020-03-04 | End: 2020-03-04

## 2020-03-04 RX ORDER — FENTANYL CITRATE 50 UG/ML
INJECTION, SOLUTION INTRAMUSCULAR; INTRAVENOUS AS NEEDED
Status: DISCONTINUED | OUTPATIENT
Start: 2020-03-04 | End: 2020-03-04 | Stop reason: HOSPADM

## 2020-03-04 RX ORDER — SODIUM CHLORIDE 0.9 % (FLUSH) 0.9 %
3 SYRINGE (ML) INJECTION EVERY 12 HOURS SCHEDULED
Status: DISCONTINUED | OUTPATIENT
Start: 2020-03-04 | End: 2020-03-04 | Stop reason: HOSPADM

## 2020-03-04 RX ORDER — LIDOCAINE HYDROCHLORIDE 20 MG/ML
INJECTION, SOLUTION INFILTRATION; PERINEURAL AS NEEDED
Status: DISCONTINUED | OUTPATIENT
Start: 2020-03-04 | End: 2020-03-04 | Stop reason: HOSPADM

## 2020-03-04 RX ORDER — CHLORHEXIDINE GLUCONATE 500 MG/1
1 CLOTH TOPICAL EVERY 12 HOURS
Status: DISCONTINUED | OUTPATIENT
Start: 2020-03-04 | End: 2020-03-04

## 2020-03-04 RX ORDER — AMLODIPINE BESYLATE 5 MG/1
5 TABLET ORAL DAILY
Status: DISCONTINUED | OUTPATIENT
Start: 2020-03-05 | End: 2020-03-07 | Stop reason: HOSPADM

## 2020-03-04 RX ORDER — SODIUM CHLORIDE 9 MG/ML
75 INJECTION, SOLUTION INTRAVENOUS CONTINUOUS
Status: DISCONTINUED | OUTPATIENT
Start: 2020-03-04 | End: 2020-03-05

## 2020-03-04 RX ORDER — CHLORHEXIDINE GLUCONATE 0.12 MG/ML
15 RINSE ORAL EVERY 12 HOURS SCHEDULED
Status: DISCONTINUED | OUTPATIENT
Start: 2020-03-04 | End: 2020-03-05

## 2020-03-04 RX ORDER — MIDAZOLAM HYDROCHLORIDE 1 MG/ML
INJECTION INTRAMUSCULAR; INTRAVENOUS AS NEEDED
Status: DISCONTINUED | OUTPATIENT
Start: 2020-03-04 | End: 2020-03-04 | Stop reason: HOSPADM

## 2020-03-04 RX ORDER — CHLORHEXIDINE GLUCONATE 500 MG/1
1 CLOTH TOPICAL EVERY 12 HOURS
Status: DISCONTINUED | OUTPATIENT
Start: 2020-03-04 | End: 2020-03-05

## 2020-03-04 RX ORDER — IPRATROPIUM BROMIDE AND ALBUTEROL SULFATE 2.5; .5 MG/3ML; MG/3ML
3 SOLUTION RESPIRATORY (INHALATION)
Status: DISCONTINUED | OUTPATIENT
Start: 2020-03-04 | End: 2020-03-04 | Stop reason: HOSPADM

## 2020-03-04 RX ORDER — FUROSEMIDE 40 MG/1
40 TABLET ORAL DAILY
Status: DISCONTINUED | OUTPATIENT
Start: 2020-03-05 | End: 2020-03-07 | Stop reason: HOSPADM

## 2020-03-04 RX ORDER — SODIUM CHLORIDE 0.9 % (FLUSH) 0.9 %
10 SYRINGE (ML) INJECTION AS NEEDED
Status: DISCONTINUED | OUTPATIENT
Start: 2020-03-04 | End: 2020-03-04 | Stop reason: HOSPADM

## 2020-03-04 RX ADMIN — IPRATROPIUM BROMIDE AND ALBUTEROL SULFATE 3 ML: 2.5; .5 SOLUTION RESPIRATORY (INHALATION) at 11:27

## 2020-03-04 RX ADMIN — SODIUM CHLORIDE 75 ML/HR: 9 INJECTION, SOLUTION INTRAVENOUS at 11:05

## 2020-03-04 RX ADMIN — NICOTINE 1 PATCH: 7 PATCH, EXTENDED RELEASE TRANSDERMAL at 21:27

## 2020-03-04 NOTE — PERIOPERATIVE NURSING NOTE
Dr. Garner has spoken to pt's significant other. Order to call heart surgeons to see pt before he is discharged today

## 2020-03-04 NOTE — CONSULTS
Patient Care Team:  Micah Michel MD as PCP - General (Internal Medicine)  Marya Boyd APRN as PCP - Claims Attributed    Chief complaint: aortic stenosis    Subjective     History of Present Illness  Mr. Ladd is a 69 year old male who we have been asked to see in consultation regarding his aortic stenosis.   He states for the past 6 months he has been progressively worsening exertional dyspnea and activity intolerance.  He also reports orthopnea and lower extremity edema.    He is a current smoker, drinks 4-5 beers daily. Denies illicit drug use and herbal medications.       Review of Systems   Constitutional: Positive for activity change and fatigue.   HENT: Positive for dental problem.    Eyes: Negative.    Respiratory: Positive for shortness of breath and wheezing.    Cardiovascular: Positive for leg swelling.   Gastrointestinal: Negative.    Endocrine: Negative.    Genitourinary: Negative.    Musculoskeletal: Negative.    Skin: Negative.    Allergic/Immunologic: Negative.    Neurological: Negative.    Hematological: Negative.    Psychiatric/Behavioral: Negative.         Past Medical History:   Diagnosis Date   • COPD (chronic obstructive pulmonary disease) (CMS/[facility])    • Diabetes mellitus (CMS/[facility])    • Heart murmur    • Hyperlipidemia    • Hypertension    • Valvular disease      Past Surgical History:   Procedure Laterality Date   • HERNIA REPAIR     • HERNIA REPAIR     • ILIAC ARTERY STENT  11/26/2019   • POPLITEAL ARTERY STENT Left 12/18/2019   • POPLITEAL ARTERY STENT Right 11/26/2019     Family History   Problem Relation Age of Onset   • Arrhythmia Mother    • Heart attack Maternal Grandfather    • Heart attack Paternal Grandfather    • Hypertension Neg Hx    • Hyperlipidemia Neg Hx    • Heart failure Neg Hx    • Heart disease Neg Hx      Social History     Tobacco Use   • Smoking status: Current Every Day Smoker     Packs/day: 2.00   • Smokeless tobacco: Never Used   Substance Use Topics   •  "Alcohol use: Yes     Comment: DRINKS 4-5 BEERS DAILY   • Drug use: No     Medications Prior to Admission   Medication Sig Dispense Refill Last Dose   • albuterol (PROVENTIL HFA;VENTOLIN HFA) 108 (90 BASE) MCG/ACT inhaler Inhale 2 puffs.   3/3/2020 at Unknown time   • amLODIPine (NORVASC) 5 MG tablet Take 1 tablet by mouth Daily. 30 tablet 11 3/4/2020 at Unknown time   • aspirin 81 MG EC tablet Take 81 mg by mouth Daily.   3/3/2020 at Unknown time   • atorvastatin (LIPITOR) 20 MG tablet    3/3/2020 at Unknown time   • budesonide-formoterol (SYMBICORT) 160-4.5 MCG/ACT inhaler Inhale 2 puffs.   3/3/2020 at Unknown time   • clopidogrel (PLAVIX) 75 MG tablet    3/3/2020 at Unknown time   • furosemide (LASIX) 40 MG tablet Take 40 mg by mouth Daily.   3/3/2020 at Unknown time   • lisinopril (PRINIVIL,ZESTRIL) 40 MG tablet Take 40 mg by mouth Daily.   3/4/2020 at Unknown time   • metFORMIN (GLUCOPHAGE) 500 MG tablet Take 500 mg by mouth Daily With Breakfast.   3/3/2020 at Unknown time   • rOPINIRole (REQUIP) 4 MG tablet    3/3/2020 at Unknown time   • tiotropium (SPIRIVA) 18 MCG per inhalation capsule Place 1 capsule into inhaler and inhale Daily.   3/3/2020 at Unknown time       ipratropium-albuterol 3 mL Nebulization 4x Daily - RT   sodium chloride 3 mL Intravenous Q12H     Allergies:  Penicillins and Sulfa antibiotics    Objective      Vital Signs  Temp:  [98.7 °F (37.1 °C)] 98.7 °F (37.1 °C)  Heart Rate:  [] 113  Resp:  [16-22] 20  BP: (144-169)/(82-96) 144/82    Flowsheet Rows      First Filed Value   Admission Height  175.3 cm (69\") Documented at 03/04/2020 1053   Admission Weight  108 kg (238 lb 8 oz) Documented at 03/04/2020 1053        175.3 cm (69\")    Physical Exam   Constitutional: He is oriented to person, place, and time. He appears well-developed and well-nourished.   ill kempt    HENT:   Head: Normocephalic and atraumatic.   Nose: Nose normal.   Mouth/Throat: Oropharynx is clear and moist. No " oropharyngeal exudate.   Eyes: Pupils are equal, round, and reactive to light. Conjunctivae and EOM are normal. No scleral icterus.   Neck: Normal range of motion. Neck supple. No JVD present.   Cardiovascular: Normal rate and regular rhythm.   Murmur heard.  Pulmonary/Chest: He has wheezes.   Musculoskeletal: Normal range of motion. He exhibits edema.   Neurological: He is alert and oriented to person, place, and time. No cranial nerve deficit.   Skin: Skin is warm and dry. No erythema.       Results Review:   Lab Results (last 24 hours)     Procedure Component Value Units Date/Time    POC Glucose Once [[id number]]  (Abnormal) Collected:  03/04/20 1100    Specimen:  Blood Updated:  03/04/20 1102     Glucose 133 mg/dL               Assessment/Plan       SOB (shortness of breath)      Assessment & Plan    -Severe aortic stenosis  -Obesity   -probable sleep apnea  -current tobacco abuse  -ETOH 4-5 beers a day  -COPD  -Diabetes type 2 takes metformin  -hypertension  -PAD- s/p popliteal artery stent- last dose of plavix 3/3  -nocturnal oxygen     He states he does not have current dental abscess but may have some issues that needs addressed.    Dr. Richards to review echo and provide recommendations.    Thank you for the opportunity to participate in this patient's care.    PHONG Corbin  03/04/20  1:29 PM

## 2020-03-04 NOTE — PLAN OF CARE
Problem: Patient Care Overview  Goal: Plan of Care Review  Outcome: Ongoing (interventions implemented as appropriate)  Flowsheets (Taken 3/4/2020 1818)  Plan of Care Reviewed With: patient  Outcome Summary: Admitted today for pre-op CABG/AVR on Friday. Sinus tach with ectopy on tele, all other VSS. On 3L O2, c/o shortness of breath with exersion. No complaints of pain. Education provided on what to expect for surgery.

## 2020-03-04 NOTE — DISCHARGE INSTRUCTIONS
**HOLD YOUR METFORMIN FOR 48 HOURS**    [facility]  [address] [place] [address]    Coronary Angiogram (Radial/Ulnar Approach) After Care    Refer to this sheet in the next few weeks. These instructions provide you with information on caring for yourself after your procedure. Your caregiver may also give you more specific instructions. Your treatment has been planned according to current medical practices, but problems sometimes occur. Call your caregiver if you have any problems or questions after your procedure.    Home Care Instructions:  · You may shower the day after the procedure. Remove the bandage (dressing) and gently wash the site with plain soap and water. Gently pat the site dry. You may apply a band aid daily for 2 days if desired.    · Do not apply powder or lotion to the site.  · Do not submerge the affected site in water for 3 to 5 days or until the site is completely healed.   · Do not lift, push or pull anything over 10 pounds for 2 days after your procedure.  · Inspect the site at least twice daily. You may notice some bruising at the site and it may be tender for 1 to 2 weeks.     · Increase your fluid intake for the next 2 days.    · Keep arm elevated for 24 hours. For the remainder of the day, keep your arm in “Pledge of Allegiance” position when up and about.     · You may drive 24 hours after the procedure unless otherwise instructed by your caregiver.  · Do not operate machinery or power tools for 24 hours.  · A responsible adult should be with you for the first 24 hours after you arrive home. Do not make any important legal decisions or sign legal papers for 24 hours.  Do not drink alcohol for 24 hours.    · Metformin or any medications containing Metformin should not be taken for 48 hours after your procedure.      Call Your Doctor if:   · You have unusual pain at the radial/ulnar (wrist) site.  · You have redness, warmth, swelling, or pain at the radial/ulnar  (wrist) site.  · You have drainage (other than a small amount of blood on the dressing).  · You have chills or a fever > 101.  · Your arm becomes pale or dark, cool, tingly, or numb.  · You have heavy bleeding from the site, hold pressure on the site for 20 minutes.  If the bleeding stops, apply a fresh bandage and call your cardiologist.  However, if you continue to have bleeding, call 911.

## 2020-03-05 ENCOUNTER — TRANSCRIBE ORDERS (OUTPATIENT)
Dept: RESPIRATORY THERAPY | Facility: HOSPITAL | Age: 70
End: 2020-03-05

## 2020-03-05 ENCOUNTER — APPOINTMENT (OUTPATIENT)
Dept: RESPIRATORY THERAPY | Facility: HOSPITAL | Age: 70
End: 2020-03-05

## 2020-03-05 DIAGNOSIS — Z09: Primary | ICD-10-CM

## 2020-03-05 LAB
ABO GROUP BLD: NORMAL
ALBUMIN SERPL-MCNC: 3.6 G/DL (ref 3.5–5.2)
ALBUMIN/GLOB SERPL: 1.1 G/DL
ALP SERPL-CCNC: 72 U/L (ref 39–117)
ALT SERPL W P-5'-P-CCNC: 14 U/L (ref 1–41)
ANION GAP SERPL CALCULATED.3IONS-SCNC: 12.1 MMOL/L (ref 5–15)
APTT PPP: 33.8 SECONDS (ref 22.7–35.4)
AST SERPL-CCNC: 16 U/L (ref 1–40)
BASOPHILS # BLD AUTO: 0.04 10*3/MM3 (ref 0–0.2)
BASOPHILS NFR BLD AUTO: 0.6 % (ref 0–1.5)
BILIRUB SERPL-MCNC: 0.2 MG/DL (ref 0.2–1.2)
BLD GP AB SCN SERPL QL: NEGATIVE
BUN BLD-MCNC: 12 MG/DL (ref 8–23)
BUN/CREAT SERPL: 13 (ref 7–25)
CALCIUM SPEC-SCNC: 9.7 MG/DL (ref 8.6–10.5)
CHLORIDE SERPL-SCNC: 93 MMOL/L (ref 98–107)
CHOLEST SERPL-MCNC: 223 MG/DL (ref 0–200)
CLOSE TME COLL+ADP + EPINEP PNL BLD: 87 %
CO2 SERPL-SCNC: 31.9 MMOL/L (ref 22–29)
CREAT BLD-MCNC: 0.92 MG/DL (ref 0.76–1.27)
D DIMER PPP FEU-MCNC: 2.34 MCGFEU/ML (ref 0–0.49)
DEPRECATED RDW RBC AUTO: 40.6 FL (ref 37–54)
EOSINOPHIL # BLD AUTO: 0.51 10*3/MM3 (ref 0–0.4)
EOSINOPHIL NFR BLD AUTO: 7.8 % (ref 0.3–6.2)
ERYTHROCYTE [DISTWIDTH] IN BLOOD BY AUTOMATED COUNT: 12.8 % (ref 12.3–15.4)
GFR SERPL CREATININE-BSD FRML MDRD: 82 ML/MIN/1.73
GLOBULIN UR ELPH-MCNC: 3.2 GM/DL
GLUCOSE BLD-MCNC: 161 MG/DL (ref 65–99)
GLUCOSE BLDC GLUCOMTR-MCNC: 118 MG/DL (ref 70–130)
GLUCOSE BLDC GLUCOMTR-MCNC: 128 MG/DL (ref 70–130)
GLUCOSE BLDC GLUCOMTR-MCNC: 141 MG/DL (ref 70–130)
GLUCOSE BLDC GLUCOMTR-MCNC: 155 MG/DL (ref 70–130)
HBA1C MFR BLD: 8.13 % (ref 4.8–5.6)
HCT VFR BLD AUTO: 42.6 % (ref 37.5–51)
HCT VFR BLDA CALC: 45 % (ref 38–51)
HCT VFR BLDA CALC: 45 % (ref 38–51)
HDLC SERPL-MCNC: 34 MG/DL (ref 40–60)
HGB BLD-MCNC: 14 G/DL (ref 13–17.7)
HGB BLDA-MCNC: 15.3 G/DL (ref 12–17)
HGB BLDA-MCNC: 15.3 G/DL (ref 12–17)
IMM GRANULOCYTES # BLD AUTO: 0.03 10*3/MM3 (ref 0–0.05)
IMM GRANULOCYTES NFR BLD AUTO: 0.5 % (ref 0–0.5)
INR PPP: 1.02 (ref 0.9–1.1)
LDLC SERPL CALC-MCNC: 154 MG/DL (ref 0–100)
LDLC/HDLC SERPL: 4.53 {RATIO}
LYMPHOCYTES # BLD AUTO: 1.12 10*3/MM3 (ref 0.7–3.1)
LYMPHOCYTES NFR BLD AUTO: 17 % (ref 19.6–45.3)
MAGNESIUM SERPL-MCNC: 1.6 MG/DL (ref 1.6–2.4)
MCH RBC QN AUTO: 28.6 PG (ref 26.6–33)
MCHC RBC AUTO-ENTMCNC: 32.9 G/DL (ref 31.5–35.7)
MCV RBC AUTO: 87.1 FL (ref 79–97)
MONOCYTES # BLD AUTO: 0.73 10*3/MM3 (ref 0.1–0.9)
MONOCYTES NFR BLD AUTO: 11.1 % (ref 5–12)
NEUTROPHILS # BLD AUTO: 4.15 10*3/MM3 (ref 1.7–7)
NEUTROPHILS NFR BLD AUTO: 63 % (ref 42.7–76)
NRBC BLD AUTO-RTO: 0 /100 WBC (ref 0–0.2)
NT-PROBNP SERPL-MCNC: 450.5 PG/ML (ref 5–900)
PLATELET # BLD AUTO: 202 10*3/MM3 (ref 140–450)
PMV BLD AUTO: 9 FL (ref 6–12)
POTASSIUM BLD-SCNC: 4.1 MMOL/L (ref 3.5–5.2)
PROT SERPL-MCNC: 6.8 G/DL (ref 6–8.5)
PROTHROMBIN TIME: 13.1 SECONDS (ref 11.7–14.2)
RBC # BLD AUTO: 4.89 10*6/MM3 (ref 4.14–5.8)
RH BLD: POSITIVE
SAO2 % BLDA: 66 % (ref 95–98)
SAO2 % BLDA: 92 % (ref 95–98)
SODIUM BLD-SCNC: 137 MMOL/L (ref 136–145)
T&S EXPIRATION DATE: NORMAL
TRIGL SERPL-MCNC: 175 MG/DL (ref 0–150)
VLDLC SERPL-MCNC: 35 MG/DL (ref 5–40)
WBC NRBC COR # BLD: 6.58 10*3/MM3 (ref 3.4–10.8)

## 2020-03-05 PROCEDURE — 93010 ELECTROCARDIOGRAM REPORT: CPT | Performed by: INTERNAL MEDICINE

## 2020-03-05 PROCEDURE — 86900 BLOOD TYPING SEROLOGIC ABO: CPT | Performed by: NURSE PRACTITIONER

## 2020-03-05 PROCEDURE — 99223 1ST HOSP IP/OBS HIGH 75: CPT | Performed by: INTERNAL MEDICINE

## 2020-03-05 PROCEDURE — 85025 COMPLETE CBC W/AUTO DIFF WBC: CPT | Performed by: NURSE PRACTITIONER

## 2020-03-05 PROCEDURE — 94799 UNLISTED PULMONARY SVC/PX: CPT

## 2020-03-05 PROCEDURE — 83735 ASSAY OF MAGNESIUM: CPT | Performed by: NURSE PRACTITIONER

## 2020-03-05 PROCEDURE — 93005 ELECTROCARDIOGRAM TRACING: CPT | Performed by: NURSE PRACTITIONER

## 2020-03-05 PROCEDURE — 83880 ASSAY OF NATRIURETIC PEPTIDE: CPT | Performed by: NURSE PRACTITIONER

## 2020-03-05 PROCEDURE — 86850 RBC ANTIBODY SCREEN: CPT | Performed by: NURSE PRACTITIONER

## 2020-03-05 PROCEDURE — 82962 GLUCOSE BLOOD TEST: CPT

## 2020-03-05 PROCEDURE — 85610 PROTHROMBIN TIME: CPT | Performed by: NURSE PRACTITIONER

## 2020-03-05 PROCEDURE — 85379 FIBRIN DEGRADATION QUANT: CPT | Performed by: INTERNAL MEDICINE

## 2020-03-05 PROCEDURE — 86920 COMPATIBILITY TEST SPIN: CPT

## 2020-03-05 PROCEDURE — 94640 AIRWAY INHALATION TREATMENT: CPT

## 2020-03-05 PROCEDURE — 94060 EVALUATION OF WHEEZING: CPT

## 2020-03-05 PROCEDURE — 83036 HEMOGLOBIN GLYCOSYLATED A1C: CPT | Performed by: NURSE PRACTITIONER

## 2020-03-05 PROCEDURE — 85730 THROMBOPLASTIN TIME PARTIAL: CPT | Performed by: NURSE PRACTITIONER

## 2020-03-05 PROCEDURE — 80053 COMPREHEN METABOLIC PANEL: CPT | Performed by: NURSE PRACTITIONER

## 2020-03-05 PROCEDURE — 85576 BLOOD PLATELET AGGREGATION: CPT | Performed by: NURSE PRACTITIONER

## 2020-03-05 PROCEDURE — 80061 LIPID PANEL: CPT | Performed by: NURSE PRACTITIONER

## 2020-03-05 PROCEDURE — 99231 SBSQ HOSP IP/OBS SF/LOW 25: CPT | Performed by: NURSE PRACTITIONER

## 2020-03-05 PROCEDURE — 86901 BLOOD TYPING SEROLOGIC RH(D): CPT | Performed by: NURSE PRACTITIONER

## 2020-03-05 RX ORDER — PANTOPRAZOLE SODIUM 40 MG/1
40 TABLET, DELAYED RELEASE ORAL
Status: DISCONTINUED | OUTPATIENT
Start: 2020-03-06 | End: 2020-03-07 | Stop reason: HOSPADM

## 2020-03-05 RX ORDER — ASPIRIN 81 MG/1
81 TABLET, CHEWABLE ORAL DAILY
Status: DISCONTINUED | OUTPATIENT
Start: 2020-03-05 | End: 2020-03-07 | Stop reason: HOSPADM

## 2020-03-05 RX ORDER — POTASSIUM CHLORIDE 1.5 G/1.77G
40 POWDER, FOR SOLUTION ORAL AS NEEDED
Status: DISCONTINUED | OUTPATIENT
Start: 2020-03-05 | End: 2020-03-07 | Stop reason: HOSPADM

## 2020-03-05 RX ORDER — POTASSIUM CHLORIDE 7.45 MG/ML
10 INJECTION INTRAVENOUS
Status: DISCONTINUED | OUTPATIENT
Start: 2020-03-05 | End: 2020-03-07 | Stop reason: HOSPADM

## 2020-03-05 RX ORDER — IPRATROPIUM BROMIDE AND ALBUTEROL SULFATE 2.5; .5 MG/3ML; MG/3ML
3 SOLUTION RESPIRATORY (INHALATION)
Status: DISCONTINUED | OUTPATIENT
Start: 2020-03-05 | End: 2020-03-07 | Stop reason: HOSPADM

## 2020-03-05 RX ORDER — THIAMINE MONONITRATE (VIT B1) 100 MG
100 TABLET ORAL DAILY
Status: DISCONTINUED | OUTPATIENT
Start: 2020-03-05 | End: 2020-03-07 | Stop reason: HOSPADM

## 2020-03-05 RX ORDER — ATORVASTATIN CALCIUM 80 MG/1
80 TABLET, FILM COATED ORAL NIGHTLY
Status: DISCONTINUED | OUTPATIENT
Start: 2020-03-05 | End: 2020-03-05

## 2020-03-05 RX ORDER — ATORVASTATIN CALCIUM 20 MG/1
40 TABLET, FILM COATED ORAL NIGHTLY
Status: DISCONTINUED | OUTPATIENT
Start: 2020-03-05 | End: 2020-03-05

## 2020-03-05 RX ORDER — ACETAMINOPHEN 325 MG/1
650 TABLET ORAL EVERY 6 HOURS PRN
Status: DISCONTINUED | OUTPATIENT
Start: 2020-03-05 | End: 2020-03-07 | Stop reason: HOSPADM

## 2020-03-05 RX ORDER — DIPHENOXYLATE HYDROCHLORIDE AND ATROPINE SULFATE 2.5; .025 MG/1; MG/1
1 TABLET ORAL DAILY
Status: DISCONTINUED | OUTPATIENT
Start: 2020-03-05 | End: 2020-03-07 | Stop reason: HOSPADM

## 2020-03-05 RX ORDER — BUDESONIDE AND FORMOTEROL FUMARATE DIHYDRATE 160; 4.5 UG/1; UG/1
2 AEROSOL RESPIRATORY (INHALATION)
Status: DISCONTINUED | OUTPATIENT
Start: 2020-03-05 | End: 2020-03-05

## 2020-03-05 RX ORDER — CLOPIDOGREL BISULFATE 75 MG/1
75 TABLET ORAL DAILY
Status: DISCONTINUED | OUTPATIENT
Start: 2020-03-05 | End: 2020-03-07 | Stop reason: HOSPADM

## 2020-03-05 RX ORDER — FOLIC ACID 1 MG/1
1 TABLET ORAL DAILY
Status: DISCONTINUED | OUTPATIENT
Start: 2020-03-05 | End: 2020-03-07 | Stop reason: HOSPADM

## 2020-03-05 RX ORDER — ALBUTEROL SULFATE 2.5 MG/3ML
2.5 SOLUTION RESPIRATORY (INHALATION) ONCE AS NEEDED
Status: DISCONTINUED | OUTPATIENT
Start: 2020-03-05 | End: 2020-03-05

## 2020-03-05 RX ORDER — MAGNESIUM SULFATE HEPTAHYDRATE 40 MG/ML
4 INJECTION, SOLUTION INTRAVENOUS AS NEEDED
Status: DISCONTINUED | OUTPATIENT
Start: 2020-03-05 | End: 2020-03-07 | Stop reason: HOSPADM

## 2020-03-05 RX ORDER — MAGNESIUM SULFATE HEPTAHYDRATE 40 MG/ML
2 INJECTION, SOLUTION INTRAVENOUS AS NEEDED
Status: DISCONTINUED | OUTPATIENT
Start: 2020-03-05 | End: 2020-03-07 | Stop reason: HOSPADM

## 2020-03-05 RX ORDER — ATORVASTATIN CALCIUM 20 MG/1
40 TABLET, FILM COATED ORAL NIGHTLY
Status: DISCONTINUED | OUTPATIENT
Start: 2020-03-05 | End: 2020-03-06

## 2020-03-05 RX ORDER — POTASSIUM CHLORIDE 750 MG/1
40 CAPSULE, EXTENDED RELEASE ORAL AS NEEDED
Status: DISCONTINUED | OUTPATIENT
Start: 2020-03-05 | End: 2020-03-07 | Stop reason: HOSPADM

## 2020-03-05 RX ORDER — MAGNESIUM SULFATE 1 G/100ML
1 INJECTION INTRAVENOUS AS NEEDED
Status: DISCONTINUED | OUTPATIENT
Start: 2020-03-05 | End: 2020-03-07 | Stop reason: HOSPADM

## 2020-03-05 RX ORDER — ALBUTEROL SULFATE 2.5 MG/3ML
2.5 SOLUTION RESPIRATORY (INHALATION) ONCE AS NEEDED
Status: COMPLETED | OUTPATIENT
Start: 2020-03-05 | End: 2020-03-05

## 2020-03-05 RX ORDER — ROPINIROLE 2 MG/1
4 TABLET, FILM COATED ORAL NIGHTLY
Status: DISCONTINUED | OUTPATIENT
Start: 2020-03-05 | End: 2020-03-07 | Stop reason: HOSPADM

## 2020-03-05 RX ORDER — CHLORDIAZEPOXIDE HYDROCHLORIDE 5 MG/1
5 CAPSULE, GELATIN COATED ORAL 4 TIMES DAILY PRN
Status: DISCONTINUED | OUTPATIENT
Start: 2020-03-05 | End: 2020-03-07 | Stop reason: HOSPADM

## 2020-03-05 RX ORDER — BUDESONIDE 0.5 MG/2ML
0.5 INHALANT ORAL
Status: DISCONTINUED | OUTPATIENT
Start: 2020-03-05 | End: 2020-03-07 | Stop reason: HOSPADM

## 2020-03-05 RX ADMIN — FUROSEMIDE 40 MG: 40 TABLET ORAL at 08:48

## 2020-03-05 RX ADMIN — AMLODIPINE BESYLATE 5 MG: 5 TABLET ORAL at 08:48

## 2020-03-05 RX ADMIN — CLOPIDOGREL 75 MG: 75 TABLET, FILM COATED ORAL at 17:24

## 2020-03-05 RX ADMIN — NICOTINE 1 PATCH: 7 PATCH, EXTENDED RELEASE TRANSDERMAL at 21:51

## 2020-03-05 RX ADMIN — BUDESONIDE 0.5 MG: 0.5 INHALANT RESPIRATORY (INHALATION) at 19:46

## 2020-03-05 RX ADMIN — IPRATROPIUM BROMIDE AND ALBUTEROL SULFATE 3 ML: 2.5; .5 SOLUTION RESPIRATORY (INHALATION) at 19:46

## 2020-03-05 RX ADMIN — ASPIRIN 81 MG: 81 TABLET, CHEWABLE ORAL at 08:48

## 2020-03-05 RX ADMIN — METOPROLOL TARTRATE 25 MG: 25 TABLET ORAL at 08:47

## 2020-03-05 RX ADMIN — Medication 1 TABLET: at 12:57

## 2020-03-05 RX ADMIN — IPRATROPIUM BROMIDE AND ALBUTEROL SULFATE 3 ML: 2.5; .5 SOLUTION RESPIRATORY (INHALATION) at 22:44

## 2020-03-05 RX ADMIN — FOLIC ACID 1 MG: 1 TABLET ORAL at 12:56

## 2020-03-05 RX ADMIN — METOPROLOL TARTRATE 25 MG: 25 TABLET ORAL at 20:03

## 2020-03-05 RX ADMIN — ALBUTEROL SULFATE 2.5 MG: 2.5 SOLUTION RESPIRATORY (INHALATION) at 08:16

## 2020-03-05 RX ADMIN — ROPINIROLE 4 MG: 2 TABLET, FILM COATED ORAL at 20:03

## 2020-03-05 RX ADMIN — Medication 100 MG: at 12:56

## 2020-03-05 RX ADMIN — ATORVASTATIN CALCIUM 40 MG: 20 TABLET, FILM COATED ORAL at 20:03

## 2020-03-05 RX ADMIN — ACETAMINOPHEN 650 MG: 325 TABLET, FILM COATED ORAL at 09:31

## 2020-03-05 NOTE — CONSULTS
Internal medicine consult    Referring physician  Dr. DENG    Chief complaint  Aortic stenosis  Shortness of breath    Reason for consult  Follow medical problems    History of present illness  69-year-old white male with history of diabetes hypertension hyperlipidemia valvular heart disease and COPD who continues to smoke and on home oxygen secondary to chronic hypoxic aspiratory failure admitted to cardiology service for severe aortic stenosis for surgical evaluation.  Patient evaluated by cardiothoracic surgery and recommend TAVR and I am asked to follow the patient for medical problem.  At the time of interview he denies any chest pain but he gets short of breath with minimal exertion he has chronic cough occasional nausea but no vomiting diarrhea.  Patient also denies any fever chills night sweats weight loss.    Past Medical History:  Medical History        Past Medical History:   Diagnosis Date   • COPD (chronic obstructive pulmonary disease) (CMS/[facility])     • Diabetes mellitus (CMS/[facility])     • Heart murmur     • Hyperlipidemia     • Hypertension     • Valvular disease           Past Surgical History:  Surgical History         Past Surgical History:   Procedure Laterality Date   • CARDIAC CATHETERIZATION N/A 3/4/2020     Procedure: right and Left Heart Cath,;  Surgeon: Paulino Mariee MD;  [facility];  Service: Cardiology;  Laterality: N/A;   • CARDIAC CATHETERIZATION N/A 3/4/2020     Procedure: Coronary angiography;  Surgeon: Paulino Mariee MD;  Location:  [facility] CATH INVASIVE LOCATION;  Service: Cardiology;  Laterality: N/A;   • CARDIAC CATHETERIZATION N/A 3/4/2020     Procedure: Left ventriculography;  Surgeon: Paulino Mariee MD;  Location:  [facility] CATH INVASIVE LOCATION;  Service: Cardiology;  Laterality: N/A;   • HERNIA REPAIR       • HERNIA REPAIR       • ILIAC ARTERY STENT   11/26/2019   • POPLITEAL ARTERY STENT Left 12/18/2019   • POPLITEAL ARTERY STENT  "Right 11/26/2019         Social History:   Social History      Tobacco Use   • Smoking status: Current Every Day Smoker       Packs/day: 2.00   • Smokeless tobacco: Never Used   Substance Use Topics   • Alcohol use: Yes       Comment: DRINKS 4-5 BEERS DAILY      Family History:        Family History   Problem Relation Age of Onset   • Arrhythmia Mother     • Heart attack Maternal Grandfather     • Heart attack Paternal Grandfather     • Hypertension Neg Hx     • Hyperlipidemia Neg Hx     • Heart failure Neg Hx     • Heart disease Neg Hx        Allergies:       Allergies   Allergen Reactions   • Penicillins     • Sulfa Antibiotics       Home medications reviewed    Review of Systems:   GI: Denies abdominal pain and nausea, vomiting, diarrhea  Cardiac: Positive for chest pain on exertion; denies palpitations  Pulmonary: Positive for CAMPOVERDE; denies cough     Physical Exam:  Blood pressure 135/72, pulse 94, temperature 98 °F (36.7 °C), temperature source Oral, resp. rate 18, height 175.3 cm (69\"), weight 108 kg (238 lb 8 oz), SpO2 93 %.    General:  Appears in no acute distress; resting comfortably in chair with family at bedside  Eyes: PERTL,  HEENT:  No JVD. Thyroid not visibly enlarged. No mucosal pallor or cyanosis  Respiratory: Respirations regular and unlabored at rest. BBS with good air entry in all fields. No crackles, rubs or wheezes auscultated  Cardiovascular: S1S2 Regular rate and rhythm. No  rub or gallop; systolic murmur RSB. DP/PT pulses   2+. 2-3+ pretibial pitting edema  Gastrointestinal: Abdomen soft, round, non tender. Bowel sounds present.  No ascites  Musculoskeletal: ARTEAGA x4. No abnormal movements  Extremities: No digital clubbing or cyanosis  Skin: Color pink. Skin warm and dry to touch. No rashes    Neuro: AAO x3 CN II-XII grossly intact  Psych: Mood and affect normal, pleasant and cooperative      LABS  Lab Results (last 24 hours)     Procedure Component Value Units Date/Time    POC Glucose Once " [[id number]]  (Abnormal) Collected:  03/05/20 1044    Specimen:  Blood Updated:  03/05/20 1046     Glucose 155 mg/dL     POC Panel 9, ISTAT [[id number]]  (Abnormal) Collected:  03/04/20 1223    Specimen:  Blood Updated:  03/05/20 0617     Hemoglobin 15.3 g/dL      Hematocrit 45 %      O2 Saturation, Arterial 66 %      Comment: Serial Number: [id number]:  [id number]       POC Panel 9, ISTAT [[id number]]  (Abnormal) Collected:  03/04/20 1219    Specimen:  Blood Updated:  03/05/20 0617     Hemoglobin 15.3 g/dL      Hematocrit 45 %      O2 Saturation, Arterial 92 %      Comment: Serial Number: [id number]:  [id number]       POC Glucose Once [[id number]]  (Normal) Collected:  03/05/20 0611    Specimen:  Blood Updated:  03/05/20 0612     Glucose 118 mg/dL     BNP [[id number]]  (Normal) Collected:  03/05/20 0318    Specimen:  Blood Updated:  03/05/20 0403     proBNP 450.5 pg/mL     Narrative:       Among patients with dyspnea, NT-proBNP is highly sensitive for the detection of acute congestive heart failure. In addition NT-proBNP of <300 pg/ml effectively rules out acute congestive heart failure with 99% negative predictive value.    Results may be falsely decreased if patient taking Biotin.      Comprehensive Metabolic Panel [[id number]]  (Abnormal) Collected:  03/05/20 0318    Specimen:  Blood Updated:  03/05/20 0403     Glucose 161 mg/dL      BUN 12 mg/dL      Creatinine 0.92 mg/dL      Sodium 137 mmol/L      Potassium 4.1 mmol/L      Chloride 93 mmol/L      CO2 31.9 mmol/L      Calcium 9.7 mg/dL      Total Protein 6.8 g/dL      Albumin 3.60 g/dL      ALT (SGPT) 14 U/L      AST (SGOT) 16 U/L      Alkaline Phosphatase 72 U/L      Total Bilirubin 0.2 mg/dL      eGFR Non African Amer 82 mL/min/1.73      Globulin 3.2 gm/dL      A/G Ratio 1.1 g/dL      BUN/Creatinine Ratio 13.0     Anion Gap 12.1 mmol/L     Narrative:       GFR Normal >60  Chronic Kidney Disease <60  Kidney Failure <15      Magnesium [[id number]]  (Normal)  Collected:  03/05/20 0318    Specimen:  Blood Updated:  03/05/20 0403     Magnesium 1.6 mg/dL     Lipid Panel [[id number]]  (Abnormal) Collected:  03/05/20 0318    Specimen:  Blood Updated:  03/05/20 0403     Total Cholesterol 223 mg/dL      Triglycerides 175 mg/dL      HDL Cholesterol 34 mg/dL      LDL Cholesterol  154 mg/dL      VLDL Cholesterol 35 mg/dL      LDL/HDL Ratio 4.53    Narrative:       Cholesterol Reference Ranges  (U.S. Department of Health and Human Services ATP III Classifications)    Desirable          <200 mg/dL  Borderline High    200-239 mg/dL  High Risk          >240 mg/dL      Triglyceride Reference Ranges  (U.S. Department of Health and Human Services ATP III Classifications)    Normal           <150 mg/dL  Borderline High  150-199 mg/dL  High             200-499 mg/dL  Very High        >500 mg/dL    HDL Reference Ranges  (U.S. Department of Health and Human Services ATP III Classifcations)    Low     <40 mg/dl (major risk factor for CHD)  High    >60 mg/dl ('negative' risk factor for CHD)        LDL Reference Ranges  (U.S. Department of Health and Human Services ATP III Classifcations)    Optimal          <100 mg/dL  Near Optimal     100-129 mg/dL  Borderline High  130-159 mg/dL  High             160-189 mg/dL  Very High        >189 mg/dL    aPTT [[id number]]  (Normal) Collected:  03/05/20 0318    Specimen:  Blood from Arm, Right Updated:  03/05/20 0352     PTT 33.8 seconds     Protime-INR [[id number]]  (Normal) Collected:  03/05/20 0318    Specimen:  Blood from Arm, Right Updated:  03/05/20 0352     Protime 13.1 Seconds      INR 1.02    Hemoglobin A1c [[id number]]  (Abnormal) Collected:  03/05/20 0318    Specimen:  Blood Updated:  03/05/20 0347     Hemoglobin A1C 8.13 %     Narrative:       Hemoglobin A1C Ranges:    Increased Risk for Diabetes  5.7% to 6.4%  Diabetes                     >= 6.5%  Diabetic Goal                < 7.0%    CBC & Differential [[id number]] Collected:  03/05/20 0318     Specimen:  Blood Updated:  03/05/20 0342    Narrative:       The following orders were created for panel order CBC & Differential.  Procedure                               Abnormality         Status                     ---------                               -----------         ------                     CBC Auto Differential[[id number]]        Abnormal            Final result                 Please view results for these tests on the individual orders.    CBC Auto Differential [[id number]]  (Abnormal) Collected:  03/05/20 0318    Specimen:  Blood Updated:  03/05/20 0342     WBC 6.58 10*3/mm3      RBC 4.89 10*6/mm3      Hemoglobin 14.0 g/dL      Hematocrit 42.6 %      MCV 87.1 fL      MCH 28.6 pg      MCHC 32.9 g/dL      RDW 12.8 %      RDW-SD 40.6 fl      MPV 9.0 fL      Platelets 202 10*3/mm3      Neutrophil % 63.0 %      Lymphocyte % 17.0 %      Monocyte % 11.1 %      Eosinophil % 7.8 %      Basophil % 0.6 %      Immature Grans % 0.5 %      Neutrophils, Absolute 4.15 10*3/mm3      Lymphocytes, Absolute 1.12 10*3/mm3      Monocytes, Absolute 0.73 10*3/mm3      Eosinophils, Absolute 0.51 10*3/mm3      Basophils, Absolute 0.04 10*3/mm3      Immature Grans, Absolute 0.03 10*3/mm3      nRBC 0.0 /100 WBC     Platelet Function ADP [[id number]] Collected:  03/05/20 0320    Specimen:  Blood Updated:  03/05/20 0336     ADP Aggregation, % Platelet 87 %     Urinalysis With Culture If Indicated - Urine, Clean Catch [[id number]]  (Abnormal) Collected:  03/04/20 2008    Specimen:  Urine, Clean Catch Updated:  03/04/20 2053     Color, UA Yellow     Appearance, UA Clear     pH, UA 6.0     Specific Gravity, UA 1.029     Glucose, UA Negative     Ketones, UA Negative     Bilirubin, UA Negative     Blood, UA Negative     Protein, UA >=300 mg/dL (3+)     Leuk Esterase, UA Negative     Nitrite, UA Negative     Urobilinogen, UA 1.0 E.U./dL    Urinalysis, Microscopic Only - Urine, Clean Catch [[id number]] Collected:  03/04/20 2008     Specimen:  Urine, Clean Catch Updated:  03/04/20 2053     RBC, UA 0-2 /HPF      WBC, UA 0-2 /HPF      Bacteria, UA None Seen /HPF      Squamous Epithelial Cells, UA 0-2 /HPF      Hyaline Casts, UA None Seen /LPF      Methodology Automated Microscopy    POC Glucose Once [[id number]]  (Abnormal) Collected:  03/04/20 2047    Specimen:  Blood Updated:  03/04/20 2048     Glucose 232 mg/dL     Blood Gas, Arterial [[id number]]  (Abnormal) Collected:  03/04/20 1915    Specimen:  Arterial Blood Updated:  03/04/20 1920     Site Arterial: left radial     Ross's Test Positive     pH, Arterial 7.381 pH units      pCO2, Arterial 57.2 mm Hg      Comment: Critical:Notify BRIAN PAULSON (04-Mar-20 19:18:54)Read back ok        pO2, Arterial 73.4 mm Hg      HCO3, Arterial 33.9 mmol/L      Base Excess, Arterial 6.6 mmol/L      O2 Saturation Calculated 93.8 %      Barometric Pressure for Blood Gas 751.9 mmHg      Modality Cannula     Flow Rate 3 lpm      Rate 20 Breaths/minute         Imaging Results (Last 24 Hours)     Procedure Component Value Units Date/Time    XR Chest PA & Lateral [[id number]] Collected:  03/04/20 2037     Updated:  03/04/20 2040    Narrative:       PA AND LATERAL CHEST     CLINICAL HISTORY: Preop chest x-ray.     The lungs are fairly well-expanded and appear free of infiltrates. There  are no pleural effusions. The heart is borderline enlarged. The  pulmonary vasculature appears within normal limits.     IMPRESSIONS: No evidence of active disease within the chest     This report was finalized on 3/4/2020 8:37 PM by Dr. Marcelino Romero M.D.         ECG 12 Lead        HEART RATE= 95  bpm  RR Interval= 668  ms  ID Interval= 176  ms  P Horizontal Axis= 24  deg  P Front Axis= 52  deg  QRSD Interval= 87  ms  QT Interval= 345  ms  QRS Axis= 51  deg  T Wave Axis= 58  deg  - OTHERWISE NORMAL ECG -  Sinus rhythm  Ventricular premature complex             Current Facility-Administered Medications:   •  acetaminophen (TYLENOL)  tablet 650 mg, 650 mg, Oral, Q6H PRN, January Stout APRN, 650 mg at 03/05/20 0931  •  amLODIPine (NORVASC) tablet 5 mg, 5 mg, Oral, Daily, Paulino Mariee MD, 5 mg at 03/05/20 0848  •  aspirin chewable tablet 81 mg, 81 mg, Oral, Daily, Ashley Leger APRN, 81 mg at 03/05/20 0848  •  atorvastatin (LIPITOR) tablet 80 mg, 80 mg, Oral, Nightly, Justine Keller APRN  •  budesonide-formoterol (SYMBICORT) 160-4.5 MCG/ACT inhaler 2 puff, 2 puff, Inhalation, BID - RT, January Stout APRN  •  chlordiazePOXIDE (LIBRIUM) capsule 5 mg, 5 mg, Oral, 4x Daily PRN, Jr Philip Richards MD  •  clopidogrel (PLAVIX) tablet 75 mg, 75 mg, Oral, Daily, January Stout APRN  •  folic acid (FOLVITE) tablet 1 mg, 1 mg, Oral, Daily, January Stout APRN, 1 mg at 03/05/20 1256  •  furosemide (LASIX) tablet 40 mg, 40 mg, Oral, Daily, Paulino Mariee MD, 40 mg at 03/05/20 0848  •  magnesium sulfate 4 gram infusion - Mg less than or equal to 1mg/dL, 4 g, Intravenous, PRN **OR** magnesium sulfate 3 gram infusion (1gm x 3) - Mg 1.1 - 1.5 mg/dL, 1 g, Intravenous, PRN **OR** Magnesium Sulfate 2 gram infusion- Mg 1.6 - 1.9 mg/dL, 2 g, Intravenous, PRN, Justine Keller APRN  •  metoprolol tartrate (LOPRESSOR) tablet 25 mg, 25 mg, Oral, Q12H, Ashley Leger APRN, 25 mg at 03/05/20 0847  •  multivitamin (THERAGRAN) tablet 1 tablet, 1 tablet, Oral, Daily, January Stout APRN, 1 tablet at 03/05/20 1257  •  nicotine (NICODERM CQ) 7 MG/24HR patch 1 patch, 1 patch, Transdermal, Q24H, Paulino Mariee MD, 1 patch at 03/04/20 2127  •  potassium chloride (MICRO-K) CR capsule 40 mEq, 40 mEq, Oral, PRN **OR** potassium chloride (KLOR-CON) packet 40 mEq, 40 mEq, Oral, PRN **OR** potassium chloride 10 mEq in 100 mL IVPB, 10 mEq, Intravenous, Q1H PRN, Justine Keller, APRN  •  rOPINIRole (REQUIP) tablet 4 mg, 4 mg, Oral, Nightly, January Stout, APRN  •  sodium chloride 0.9 % infusion,  75 mL/hr, Intravenous, Continuous, Paulino Mariee MD, Last Rate: 75 mL/hr at 03/04/20 1531, 75 mL/hr at 03/04/20 1531  •  thiamine (VITAMIN B-1) tablet 100 mg, 100 mg, Oral, Daily, January Stout APRN, 100 mg at 03/05/20 1256  •  tiotropium (SPIRIVA RESPIMAT) 2.5 mcg/act aerosol solution inhaler, 2 puff, Inhalation, Daily - RT, January Stout APRN     ASSESSMENT  Severe aortic stenosis  Chronic hypoxic aspiratory failure  COPD/tobacco abuse  Morbidly obese  Obstructive sleep apnea  Diabetes mellitus  Hypertension  Hyperlipidemia  Peripheral artery disease status post popliteal stent  Gastroesophageal reflux disease    PLAN  Agree with current care  Supplement oxygen nebulizer  Accu-Chek with sliding scale insulin  Adjust home medications  TAVR per cardiology  Stress ulcer DVT prophylaxis  Supportive care  Repeat labs in the morning  Patient is full code  We will follow with Dr. DENG further recommendation current hospital course    YULIYA FERNANDEZ MD

## 2020-03-05 NOTE — PROGRESS NOTES
" LOS: 1 day   Patient Care Team:  Micah Michel MD as PCP - General (Internal Medicine)  Marya Boyd APRN as PCP - Claims Attributed    Chief Complaint: aortic stenosis/CAD    Subjective:  Symptoms:  No shortness of breath, cough or chest pain.    Diet:  No nausea or vomiting.    Activity level: Normal.    Pain:  He reports no pain.      No new c/o this morning, denies any chest pain    Vital Signs  Temp:  [98 °F (36.7 °C)-98.7 °F (37.1 °C)] 98 °F (36.7 °C)  Heart Rate:  [] 115  Resp:  [16-22] 18  BP: (113-169)/(63-98) 132/79  Body mass index is 35.22 kg/m².    Intake/Output Summary (Last 24 hours) at 3/5/2020 0912  Last data filed at 3/4/2020 2330  Gross per 24 hour   Intake 2100 ml   Output 300 ml   Net 1800 ml     No intake/output data recorded.          03/04/20  1053   Weight: 108 kg (238 lb 8 oz)       Objective:  General Appearance:  Comfortable and in no acute distress.    Vital signs: (most recent): Blood pressure 132/79, pulse 115, temperature 98 °F (36.7 °C), temperature source Oral, resp. rate 18, height 175.3 cm (69\"), weight 108 kg (238 lb 8 oz), SpO2 91 %.  Vital signs are normal.  No fever.    Output: Producing urine.    Lungs:  Normal effort and normal respiratory rate.  There are decreased breath sounds and wheezes.    Heart: Normal rate.  Irregular rhythm.  Positive for murmur.  (SR with frequent PACs)  Abdomen: Abdomen is soft.  Bowel sounds are normal.     Extremities: There is dependent edema.  (+2 edema bilateral LE)  Pulses: Distal pulses are intact.    Neurological: Patient is alert and oriented to person, place and time.    Skin:  Warm and dry.        Results Review:        WBC WBC   Date Value Ref Range Status   03/05/2020 6.58 3.40 - 10.80 10*3/mm3 Final      HGB Hemoglobin   Date Value Ref Range Status   03/05/2020 14.0 13.0 - 17.7 g/dL Final   03/04/2020 15.3 12.0 - 17.0 g/dL Final   03/04/2020 15.3 12.0 - 17.0 g/dL Final      HCT Hematocrit   Date Value Ref Range Status "   03/05/2020 42.6 37.5 - 51.0 % Final   03/04/2020 45 38 - 51 % Final   03/04/2020 45 38 - 51 % Final      Platelets Platelets   Date Value Ref Range Status   03/05/2020 202 140 - 450 10*3/mm3 Final        PT/INR:    Protime   Date Value Ref Range Status   03/05/2020 13.1 11.7 - 14.2 Seconds Final   /  INR   Date Value Ref Range Status   03/05/2020 1.02 0.90 - 1.10 Final       Sodium Sodium   Date Value Ref Range Status   03/05/2020 137 136 - 145 mmol/L Final      Potassium Potassium   Date Value Ref Range Status   03/05/2020 4.1 3.5 - 5.2 mmol/L Final      Chloride Chloride   Date Value Ref Range Status   03/05/2020 93 (L) 98 - 107 mmol/L Final      Bicarbonate CO2   Date Value Ref Range Status   03/05/2020 31.9 (H) 22.0 - 29.0 mmol/L Final      BUN BUN   Date Value Ref Range Status   03/05/2020 12 8 - 23 mg/dL Final      Creatinine Creatinine   Date Value Ref Range Status   03/05/2020 0.92 0.76 - 1.27 mg/dL Final      Calcium Calcium   Date Value Ref Range Status   03/05/2020 9.7 8.6 - 10.5 mg/dL Final      Magnesium Magnesium   Date Value Ref Range Status   03/05/2020 1.6 1.6 - 2.4 mg/dL Final            amLODIPine 5 mg Oral Daily   aspirin 81 mg Oral Daily   atorvastatin 40 mg Oral Nightly   chlorhexidine 15 mL Mouth/Throat Q12H   Chlorhexidine Gluconate Cloth 1 application Topical Q12H   furosemide 40 mg Oral Daily   [START ON 3/6/2020] metoprolol tartrate 12.5 mg Oral On Call to OR   metoprolol tartrate 25 mg Oral Q12H   mupirocin 1 application Each Nare Q12H   nicotine 1 patch Transdermal Q24H   vancomycin 15 mg/kg Intravenous On Call to OR       sodium chloride 75 mL/hr Last Rate: 75 mL/hr (03/04/20 1531)       Patient Active Problem List   Diagnosis Code   • Aortic valve stenosis I35.0   • Chronic obstructive pulmonary disease (CMS/HCC) J44.9   • Essential hypertension I10   • Hyperlipidemia E78.5   • SOB (shortness of breath) R06.02   • Overweight E66.3   • Nonrheumatic aortic valve stenosis I35.0        Assessment & Plan   -Severe aortic stenosis--plan for CABG/AVR tomorrow  - CAD  -Obesity   -probable sleep apnea  -current tobacco abuse--nicotine patch in place  -ETOH 4-5 beers a day  -COPD  -Diabetes type II- A1C 8.13  -hypertension  -PAD- s/p popliteal artery stent- last dose of plavix 3/3  -nocturnal oxygen at home    Carotid doppler revealing mild stenosis bilaterally  Vein mapping reveals adequate and patent saphenous veins on the right leg and chronic superficial vein thrombosis in left calf, but patent and adequate saphenous vein of the left thigh  PFTs completed put results pending  Pulmonary consulted yesterday- have yet to the see the patient   Will have internal medicine to see for DM management  Restarted home inhalers  WA protocol for possible alcohol withdrawal--will add vitamins/PRN librium  Plan for surgery tomorrow with Dr. Susie Stout, APRN  03/05/20  9:12 AM

## 2020-03-05 NOTE — CONSULTS
Group: [facility]         CONSULT NOTE    Patient Identification:    Fili Ladd  69 y.o.  male  1950  [id number]            Patient Care Team:  Micah Michel MD as PCP - General (Internal Medicine)  Marya Boyd APRN as PCP - Claims Attributed    Requesting physician:     Reason for Consultation:  COPD, AHRF, Preop    CC: Exertional dyspnea    History of Present Illness: Patient is a pleasant 69-year-old morbidly obese white male active more than 80-pack-year smoker with a past medical history significant for COPD, aortic stenosis, congestive heart failure, peripheral arterial disease status post iliac and popliteal artery stent who was admitted from the clinic by his cardiologist due to concern for worsening shortness of breath thought to be from his aortic valvular heart disease.  ED does carry a diagnosis COPD and was previously seen by a pulmonologist on [facility] which he does not follow in the last 2 years.  He uses Symbicort and Spiriva and has been on round-the-clock nebulizers over the last several weeks as he continued to feel short of breath with minimal exertion.  He states that he uses oxygen at night.  Given persistent symptoms and nonresolution while using his respiratory medications he ended up going to his cardiologist and was admitted to the hospital for need for urgent surgical evaluation.  We are asked to assist in the management of the patient.  He unfortunately continues to smoke around 2 packs/day and states that he knows to quit but has had a tough time.  Also told that he has sleep apnea and was offered study but he did not get it done previously.  Denies any worsening cough or sputum production fevers or chills prior to the admission to the hospital    I have reviewed the H&P as well as the notes from the other consultants taking care of the patient this admission as well as previous hospital notes (if any available) from our group physicians and  summarized above      Objective     Review of Systems:  Constitutional: No fever, chills, weight loss or loss of appetite.   ENMT: No sinus congestion, post nasal drip, epistaxis, sore throat  Cardiovascular: No chest pain, palpitation. + legs swelling.    Respiratory: No hemoptysis, orthopnea, PND.  Gastrointestinal: No constipation, diarrhea or abdominal pain   Neurology: No headache, focal weakness, numbness or dizziness.   Musculoskeletal: No joint stiffness or swelling.   Psychiatry: No agitation or behavioral changes  Lymphatic: No swollen neck glands.  Integumentary: No rash.    Past Medical History:  Past Medical History:   Diagnosis Date   • COPD (chronic obstructive pulmonary disease) (CMS/[facility])    • Diabetes mellitus (CMS/[facility])    • Heart murmur    • Hyperlipidemia    • Hypertension    • Valvular disease        Past Surgical History:  Past Surgical History:   Procedure Laterality Date   • CARDIAC CATHETERIZATION N/A 3/4/2020    Procedure: right and Left Heart Cath,;  Surgeon: Paulino Mariee MD;  Location: [facility] CATH INVASIVE LOCATION;  Service: Cardiology;  Laterality: N/A;   • CARDIAC CATHETERIZATION N/A 3/4/2020    Procedure: Coronary angiography;  Surgeon: Paulino Mariee MD;  [facility] C[facility]ASIVE [facility];  Service: Cardiology;  Laterality: N/A;   • CARDIAC CATHETERIZATION N/A 3/4/2020    Procedure: Left ventriculography;  Surgeon: Paulino Mariee MD;  [facility];  Service: Cardiology;  Laterality: N/A;   • HERNIA REPAIR     • HERNIA REPAIR     • ILIAC ARTERY STENT  11/26/2019   • POPLITEAL ARTERY STENT Left 12/18/2019   • POPLITEAL ARTERY STENT Right 11/26/2019        Home Meds:  Medications Prior to Admission   Medication Sig Dispense Refill Last Dose   • albuterol (PROVENTIL HFA;VENTOLIN HFA) 108 (90 BASE) MCG/ACT inhaler Inhale 2 puffs.   3/3/2020 at Unknown time   • amLODIPine (NORVASC) 5 MG tablet Take 1 tablet by mouth Daily. 30 tablet  "11 3/4/2020 at Unknown time   • aspirin 81 MG EC tablet Take 81 mg by mouth Daily.   3/3/2020 at Unknown time   • atorvastatin (LIPITOR) 20 MG tablet    3/3/2020 at Unknown time   • budesonide-formoterol (SYMBICORT) 160-4.5 MCG/ACT inhaler Inhale 2 puffs.   3/3/2020 at Unknown time   • clopidogrel (PLAVIX) 75 MG tablet    3/3/2020 at Unknown time   • furosemide (LASIX) 40 MG tablet Take 40 mg by mouth Daily.   3/3/2020 at Unknown time   • lisinopril (PRINIVIL,ZESTRIL) 40 MG tablet Take 40 mg by mouth Daily.   3/4/2020 at Unknown time   • metFORMIN (GLUCOPHAGE) 500 MG tablet Take 500 mg by mouth Daily With Breakfast.   3/3/2020 at Unknown time   • rOPINIRole (REQUIP) 4 MG tablet    3/3/2020 at Unknown time   • tiotropium (SPIRIVA) 18 MCG per inhalation capsule Place 1 capsule into inhaler and inhale Daily.   3/3/2020 at Unknown time       Allergies:  Allergies   Allergen Reactions   • Penicillins    • Sulfa Antibiotics        Social History:   Social History     Socioeconomic History   • Marital status:      Spouse name: Not on file   • Number of children: Not on file   • Years of education: Not on file   • Highest education level: Not on file   Tobacco Use   • Smoking status: Current Every Day Smoker     Packs/day: 2.00   • Smokeless tobacco: Never Used   Substance and Sexual Activity   • Alcohol use: Yes     Comment: DRINKS 4-5 BEERS DAILY   • Drug use: No   • Sexual activity: Defer       Family History:  Family History   Problem Relation Age of Onset   • Arrhythmia Mother    • Heart attack Maternal Grandfather    • Heart attack Paternal Grandfather    • Hypertension Neg Hx    • Hyperlipidemia Neg Hx    • Heart failure Neg Hx    • Heart disease Neg Hx        Physical Exam:  /85   Pulse 87   Temp 98.1 °F (36.7 °C) (Oral)   Resp 18   Ht 175.3 cm (69\")   Wt 108 kg (238 lb 8 oz)   SpO2 92%   BMI 35.22 kg/m²  Body mass index is 35.22 kg/m². 92% 108 kg (238 lb 8 oz)    Physical Exam "     Constitutional: Middle aged morbidly obese white male pt in bed -appears much older than stated age, No acute respiratory distress, + accessory muscle use  Head: - NCAT  Eyes: No pallor, Anicteric conjunctiva, EOMI.  ENMT:  Mallampati 4, no oral thrush. Moist MM.   NECK: Trachea midline, No thyromegaly, no palpable cervical LNpathy  Heart: RRR, + syst murmur. 1+ pedal edema   Lungs: MAILE +, distant breath sounds no wheezes/ crackles heard    Abdomen: Soft.  Obese, no tenderness, guarding or rigidity. No palpable masses  Extremities: Extremities warm and well perfused. No cyanosis/ clubbing  Neuro: Conscious, answers appropriately, no gross focal neuro deficits  Psych: Mood and affect appropriate    LABS:  Lab Results   Component Value Date    CALCIUM 9.7 03/05/2020     Results from last 7 days   Lab Units 03/05/20 0318 03/04/20  1223 03/04/20  1219   MAGNESIUM mg/dL 1.6  --   --    SODIUM mmol/L 137  --   --    POTASSIUM mmol/L 4.1  --   --    CHLORIDE mmol/L 93*  --   --    CO2 mmol/L 31.9*  --   --    BUN mg/dL 12  --   --    CREATININE mg/dL 0.92  --   --    GLUCOSE mg/dL 161*  --   --    CALCIUM mg/dL 9.7  --   --    WBC 10*3/mm3 6.58  --   --    HEMOGLOBIN g/dL 14.0  --   --    HEMOGLOBIN, POC g/dL  --  15.3 15.3   PLATELETS 10*3/mm3 202  --   --    ALT (SGPT) U/L 14  --   --    AST (SGOT) U/L 16  --   --    PROBNP pg/mL 450.5  --   --      No results found for: CKTOTAL, CKMB, CKMBINDEX, TROPONINI, TROPONINT          Results from last 7 days   Lab Units 03/05/20 0318   INR  1.02     Results from last 7 days   Lab Units 03/04/20 2008   NITRITE UA  Negative   WBC UA /HPF 0-2   BACTERIA UA /HPF None Seen   SQUAM EPITHEL UA /HPF 0-2     Results from last 7 days   Lab Units 03/04/20  1915   PH, ARTERIAL pH units 7.381   PO2 ART mm Hg 73.4*   PCO2, ARTERIAL mm Hg 57.2*   HCO3 ART mmol/L 33.9*         Imaging: I personally visualized the images of chest scans/ x-rays performed within last 3 days and summarized  below.    Assessment   Acute on chronic hypoxic respiratory failure (Nocturnal o2)  Chronic hypercapnic respiratory failure  Very severe COPD/emphysema not in exacerbation( Fev1 <1L)  Severe aortic stenosis -ongoing evaluation for valve replacement  Mild mitral regurgitation  Mild to mod pulmonary HTN (mPAP -34) - WHO group 2 + 3   Active heavy tobacco abuse  Undiagnosed MAURISIO  PAD s/p stent  Morbid obesity    RECOMMENDATIONS:  Patient with acute hypoxic respiratory failure of no obvious etiology suspect that this is more chronic hypoxemia which was not diagnosed previously.  Chest x-ray reviewed and does not show significant concern for volume overload as well.  Left heart catheterization shows LVEDP of 13 and mild to moderate pulmonary pretension. LE venous duplex neg as well. Will ideally need a CTA to r/o PE. Will get a d dimer to see if we can avoid it.   ABG is very concerning for hypercapnia which is likely multifactorial from severe COPD/emphysema as well as obesity hypoventilation syndrome.  Spirometry done this morning reviewed and shows very severe obstruction.  Noted ongoing evaluation by cardiac and cardiac surgery and patient is moderate to high risk for respiratory decompensation -pneumonia, prolonged ventilator weaning including death.  This is however not prohibitive and no absolute contraindication for planned aortic valve procedures.  Discussed the same with the patient.   We will try a nocturnal noninvasive ventilator to help with undiagnosed LEEP apnea as well as chronic hypercapnia  Continue bronchodilators.  No current concern for COPD exacerbation  NRT patch.   We will keep him as negative as possible    Thank you for letting me participate in the care of this pleasant patient.  I have discussed my findings and recommendations with patient, family and nursing staff.     Manan Rodriguez MD  3/5/2020  5:08 PM

## 2020-03-05 NOTE — H&P (VIEW-ONLY)
Structural heart consult  Date of Hospital Visit: 20  Encounter Provider: Brandon Montenegro MD  Place of Service: [facility] CARDIOLOGY  Patient Name: Fili Ladd  :1950  [id number]  Referral Provider: Paulino Mariee, *    Chief complaint: Shortness of breath    History of Present Illness: 69-year-old gentleman works at [employer] for the last 52 years longstanding history of tobacco abuse recently has been gaining some weight but has been short of breath.  He is been referred to see Dr. Felix who very quickly recognized he had severe aortic stenosis.  He underwent echo evaluation as well as cardiac cath his cath show some moderate circumflex disease but nothing else severe of course he has severe aortic stenosis.  He was referred to Dr. Chito Richards for surgical aortic valve but in reviewing his pulmonary status is felt to be too high risk for traditional surgical aortic valve replacement and Dr. Richards has ask as well as Dr. Felix that we see him for a transaortic valve replacement.  He has never had a stroke he does have COPD he does not have any significant kidney disease.  He is allergic to penicillin he is never had rheumatic fever      Past Medical History:   Diagnosis Date   • COPD (chronic obstructive pulmonary disease) (CMS/[facility])    • Diabetes mellitus (CMS/[facility])    • Heart murmur    • Hyperlipidemia    • Hypertension    • Valvular disease        Past Surgical History:   Procedure Laterality Date   • CARDIAC CATHETERIZATION N/A 3/4/2020    Procedure: right and Left Heart Cath,;  Surgeon: Paulino Mariee MD;  Location:  [facility] CATH INVASIVE LOCATION;  Service: Cardiology;  Laterality: N/A;   • CARDIAC CATHETERIZATION N/A 3/4/2020    Procedure: Coronary angiography;  Surgeon: Paulino Mariee MD;  Location:  [facility] CATH INVASIVE LOCATION;  Service: Cardiology;  Laterality: N/A;   • CARDIAC CATHETERIZATION N/A 3/4/2020     Procedure: Left ventriculography;  Surgeon: Paulino Mariee MD;  Location: [facility] INVASIVE LOCATION;  Service: Cardiology;  Laterality: N/A;   • HERNIA REPAIR     • HERNIA REPAIR     • ILIAC ARTERY STENT  11/26/2019   • POPLITEAL ARTERY STENT Left 12/18/2019   • POPLITEAL ARTERY STENT Right 11/26/2019       Medications Prior to Admission   Medication Sig Dispense Refill Last Dose   • albuterol (PROVENTIL HFA;VENTOLIN HFA) 108 (90 BASE) MCG/ACT inhaler Inhale 2 puffs.   3/3/2020 at Unknown time   • amLODIPine (NORVASC) 5 MG tablet Take 1 tablet by mouth Daily. 30 tablet 11 3/4/2020 at Unknown time   • aspirin 81 MG EC tablet Take 81 mg by mouth Daily.   3/3/2020 at Unknown time   • atorvastatin (LIPITOR) 20 MG tablet    3/3/2020 at Unknown time   • budesonide-formoterol (SYMBICORT) 160-4.5 MCG/ACT inhaler Inhale 2 puffs.   3/3/2020 at Unknown time   • clopidogrel (PLAVIX) 75 MG tablet    3/3/2020 at Unknown time   • furosemide (LASIX) 40 MG tablet Take 40 mg by mouth Daily.   3/3/2020 at Unknown time   • lisinopril (PRINIVIL,ZESTRIL) 40 MG tablet Take 40 mg by mouth Daily.   3/4/2020 at Unknown time   • metFORMIN (GLUCOPHAGE) 500 MG tablet Take 500 mg by mouth Daily With Breakfast.   3/3/2020 at Unknown time   • rOPINIRole (REQUIP) 4 MG tablet    3/3/2020 at Unknown time   • tiotropium (SPIRIVA) 18 MCG per inhalation capsule Place 1 capsule into inhaler and inhale Daily.   3/3/2020 at Unknown time       Current Meds  Scheduled Meds:  amLODIPine 5 mg Oral Daily   aspirin 81 mg Oral Daily   atorvastatin 40 mg Oral Nightly   budesonide-formoterol 2 puff Inhalation BID - RT   clopidogrel 75 mg Oral Daily   folic acid 1 mg Oral Daily   furosemide 40 mg Oral Daily   insulin lispro 0-7 Units Subcutaneous 4x Daily With Meals & Nightly   ipratropium-albuterol 3 mL Nebulization Q4H - RT   metoprolol tartrate 25 mg Oral Q12H   multivitamin 1 tablet Oral Daily   nicotine 1 patch Transdermal Q24H   [START ON 3/6/2020]  pantoprazole 40 mg Oral Q AM   rOPINIRole 4 mg Oral Nightly   thiamine 100 mg Oral Daily     Continuous Infusions:  sodium chloride 75 mL/hr Last Rate: 75 mL/hr (03/04/20 1531)     PRN Meds:.•  acetaminophen  •  chlordiazePOXIDE  •  magnesium sulfate **OR** magnesium sulfate in D5W 1g/100mL (PREMIX) **OR** magnesium sulfate  •  potassium chloride **OR** potassium chloride **OR** potassium chloride    Allergies as of 02/27/2020 - Reviewed 02/27/2020   Allergen Reaction Noted   • Penicillins  10/22/2015   • Sulfa antibiotics  10/22/2015       Social History     Socioeconomic History   • Marital status:      Spouse name: Not on file   • Number of children: Not on file   • Years of education: Not on file   • Highest education level: Not on file   Tobacco Use   • Smoking status: Current Every Day Smoker     Packs/day: 2.00   • Smokeless tobacco: Never Used   Substance and Sexual Activity   • Alcohol use: Yes     Comment: DRINKS 4-5 BEERS DAILY   • Drug use: No   • Sexual activity: Defer       Family History   Problem Relation Age of Onset   • Arrhythmia Mother    • Heart attack Maternal Grandfather    • Heart attack Paternal Grandfather    • Hypertension Neg Hx    • Hyperlipidemia Neg Hx    • Heart failure Neg Hx    • Heart disease Neg Hx        REVIEW OF SYSTEMS:   ROS was performed and is negative except as outlined in HPI     REVIEW OF SYSTEMS:   CONSTITUTIONAL: No weight loss, fever, chills, weakness or fatigue.   HEENT: Eyes: No visual loss, blurred vision, double vision or yellow sclerae. Ears, Nose, Throat: No hearing loss, sneezing, congestion, runny nose or sore throat.   SKIN: No rash or itching.     RESPIRATORY: No shortness of breath, hemoptysis, cough or sputum.   GASTROINTESTINAL: No anorexia, nausea, vomiting or diarrhea. No abdominal pain, bright red blood per rectum or melena.  GENITOURINARY: No burning on urination, hematuria or increased frequency.  NEUROLOGICAL: No headache, dizziness, syncope,  "paralysis, ataxia, numbness or tingling in the extremities. No change in bowel or bladder control.   MUSCULOSKELETAL: No muscle, back pain, joint pain or stiffness.   HEMATOLOGIC: No anemia, bleeding or bruising.   LYMPHATICS: No enlarged nodes. No history of splenectomy.   PSYCHIATRIC: No history of depression, anxiety, hallucinations.   ENDOCRINOLOGIC: No reports of sweating, cold or heat intolerance. No polyuria or polydipsia.       Objective:   Temp:  [98 °F (36.7 °C)-98.7 °F (37.1 °C)] 98.1 °F (36.7 °C)  Heart Rate:  [] 87  Resp:  [18-20] 18  BP: (113-161)/(63-98) 135/85  Body mass index is 35.22 kg/m².  Flowsheet Rows      First Filed Value   Admission Height  175.3 cm (69\") Documented at 03/04/2020 1053   Admission Weight  108 kg (238 lb 8 oz) Documented at 03/04/2020 1053        Vitals:    03/05/20 1605   BP: 135/85   Pulse: 87   Resp: 18   Temp: 98.1 °F (36.7 °C)   SpO2: 92%       Head:    Normocephalic, without obvious abnormality, atraumatic   Eyes:            Lids and lashes normal, conjunctivae and sclerae normal, no   icterus, no pallor   Ears:    Ears appear intact with no abnormalities noted   Throat:   No oral lesions, dentition good   Neck:   No adenopathy, supple, trachea midline, no thyromegaly, no   carotid bruit, no JVD   Lungs:     Breath sounds are equal and clear to auscultation    Heart:    Normal S1 and S2, RRR, late peaking 3 out of 6 systolic ejection /G/R   Abdomen:     Normal bowel sounds, no masses, no organomegaly, soft        non-tender, non-distended, no guarding, very obese   Extremities:   Moves all extremities well, no edema, no cyanosis, no redness   Pulses:   Pulses palpable and equal bilaterally.    Skin:  Psychiatric:   No bleeding, bruising or rash    Awake, alert and oriented x 3, normal mood and affect             I personally viewed and interpreted the patient's EKG/Telemetry data    Assessment:  Active Hospital Problems    Diagnosis  POA   • **SOB (shortness of " breath) [R06.02]  Unknown   • Nonrheumatic aortic valve stenosis [I35.0]  Unknown   • Aortic valve stenosis [I35.0]  Yes      Resolved Hospital Problems   No resolved problems to display.       Plan: He is a gentleman with severe symptomatic degenerative aortic stenosis.  He has at least moderate if not high risk because of his pulmonary status and overall condition and obesity.  He has been turned down for surgical aortic valve replacement by Dr. Sanchez.  I think we should initiate evaluation for transaortic valve replacement.  I had a long discussion with him and his family going over the risks of TAVR with him.  And we are going to move forward with a CAT scan he does have a history of peripheral vascular disease and has had stenting done so we will have to see hopefully we can get access through his transfemoral route if not we can look at all or alternative access for him    Brandon Montenegro MD  03/05/20  4:58 PM.

## 2020-03-05 NOTE — PLAN OF CARE
Problem: Patient Care Overview  Goal: Plan of Care Review  Outcome: Ongoing (interventions implemented as appropriate)  Flowsheets (Taken 3/5/2020 1819)  Progress: no change  Plan of Care Reviewed With: patient; family  Outcome Summary: Tentative plan for TAVR as lung function is very poor. Seen today by pulmonary and hospitalist. Still sinus tach on tele with PAC's, all other VSS throughout the day. Maintaining sats in low 90's on 3L O2.

## 2020-03-05 NOTE — PROGRESS NOTES
"Discharge Planning Assessment  [facility]     Patient Name: Fili Ladd  MRN: [id number]  Today's Date: 3/5/2020    Admit Date: 3/4/2020    Discharge Needs Assessment     Row Name 03/05/20 1537       Living Environment    Lives With  significant other    Name(s) of Who Lives With Patient  shay Moienjd     Current Living Arrangements  home/apartment/condo    Primary Care Provided by  self    Provides Primary Care For  no one    Family Caregiver if Needed  significant other    Family Caregiver Names  Orquidea Rascon    Quality of Family Relationships  helpful;involved;supportive    Able to Return to Prior Arrangements  yes       Resource/Environmental Concerns    Resource/Environmental Concerns  none       Transition Planning    Patient/Family Anticipates Transition to  home with family    Patient/Family Anticipated Services at Transition  home health care    Transportation Anticipated  family or friend will provide       Discharge Needs Assessment    Readmission Within the Last 30 Days  no previous admission in last 30 days    Concerns to be Addressed  discharge planning    Equipment Currently Used at Home  oxygen    Anticipated Changes Related to Illness  none    Equipment Needed After Discharge  -- unknown at this time    Discharge Facility/Level of Care Needs  home with home health    Provided Post Acute Provider List?  Yes    Post Acute Provider List  Home Health    Delivered To  Patient    Method of Delivery  In person    Patient's Choice of Community Agency(s)  Pt has not decided yet.  List left at bedside.          Discharge Plan     Row Name 03/05/20 1539       Plan    Plan  Home;  Follow for HH choice and to confirm a new glucometer gets ordered prior to d/c (his is broken).      Plan Comments  Spoke with Pt at bedside.  CCP introduced self and role.  Pt confirmed information on face sheet.  Pt stated he is IADL'S, retired & drives.  Pt lives in an apartment with his \"girlfriend\", Orquidea Rascon " ([phone]).  Pt reports PCP is Micah Michel MD.  Pt confirms pharmacy as [facility].  Pt denies issues with affording his medications.  Pt denies past home health and sub-acute rehab.  Pt has nocturnal oxygen supplied by [facility].  Pt reports he has a non-working glucometer.  CCP to follow up to assist in getting Pt a new glucometer prior to d/c. Pt plans to return home at discharge with assistance of his girlfriend/s.o., Orquidea Rascon.  CCP left a home health list at Pt bedside for him and Orquidea to review.  CCP will continue to follow…BRIAN MARTELL/CCP        Destination      Coordination has not been started for this encounter.      Durable Medical Equipment      Coordination has not been started for this encounter.      Dialysis/Infusion      Coordination has not been started for this encounter.      Home Medical Care      Coordination has not been started for this encounter.      Therapy      Coordination has not been started for this encounter.      Community Resources      Coordination has not been started for this encounter.          Demographic Summary     Row Name 03/05/20 1533       General Information    Admission Type  inpatient    Arrived From  home    Required Notices Provided  Important Message from Medicare    Referral Source  admission list    Reason for Consult  discharge planning    Preferred Language  English     Used During This Interaction  no        Functional Status     Row Name 03/05/20 1536       Functional Status    Usual Activity Tolerance  good    Current Activity Tolerance  good       Functional Status, IADL    Medications  independent    Meal Preparation  independent    Housekeeping  independent    Laundry  independent    Shopping  independent       Mental Status    General Appearance WDL  WDL       Mental Status Summary    Recent Changes in Mental Status/Cognitive Functioning  no changes       Employment/    Employment Status  retired         Psychosocial    No documentation.       Abuse/Neglect    No documentation.       Legal    No documentation.       Substance Abuse    No documentation.       Patient Forms    No documentation.           Noy Rojas RN

## 2020-03-05 NOTE — CONSULTS
Structural heart consult  Date of Hospital Visit: 20  Encounter Provider: Brandon Montenegro MD  Place of Service: [facility] CARDIOLOGY  Patient Name: Fili Ladd  :1950  [id number]  Referral Provider: Paulino Mariee, *    Chief complaint: Shortness of breath    History of Present Illness: 69-year-old gentleman works at [employer] for the last 52 years longstanding history of tobacco abuse recently has been gaining some weight but has been short of breath.  He is been referred to see Dr. Felix who very quickly recognized he had severe aortic stenosis.  He underwent echo evaluation as well as cardiac cath his cath show some moderate circumflex disease but nothing else severe of course he has severe aortic stenosis.  He was referred to Dr. Chito Richards for surgical aortic valve but in reviewing his pulmonary status is felt to be too high risk for traditional surgical aortic valve replacement and Dr. Richards has ask as well as Dr. Felix that we see him for a transaortic valve replacement.  He has never had a stroke he does have COPD he does not have any significant kidney disease.  He is allergic to penicillin he is never had rheumatic fever      Past Medical History:   Diagnosis Date   • COPD (chronic obstructive pulmonary disease) (CMS/[facility])    • Diabetes mellitus (CMS/[facility])    • Heart murmur    • Hyperlipidemia    • Hypertension    • Valvular disease        Past Surgical History:   Procedure Laterality Date   • CARDIAC CATHETERIZATION N/A 3/4/2020    Procedure: right and Left Heart Cath,;  Surgeon: Paulino Mariee MD;  Location:  [facility] INVASIVE LOCATION;  Service: Cardiology;  Laterality: N/A;   • CARDIAC CATHETERIZATION N/A 3/4/2020    Procedure: Coronary angiography;  Surgeon: Paulino Mariee MD;  Location:  [facility] INVASIVE LOCATION;  Service: Cardiology;  Laterality: N/A;   • CARDIAC CATHETERIZATION N/A 3/4/2020     Procedure: Left ventriculography;  Surgeon: Paulino Mariee MD;  Location: [facility] INVASIVE LOC[facility];  Service: Cardiology;  Laterality: N/A;   • HERNIA REPAIR     • HERNIA REPAIR     • ILIAC ARTERY STENT  11/26/2019   • POPLITEAL ARTERY STENT Left 12/18/2019   • POPLITEAL ARTERY STENT Right 11/26/2019       Medications Prior to Admission   Medication Sig Dispense Refill Last Dose   • albuterol (PROVENTIL HFA;VENTOLIN HFA) 108 (90 BASE) MCG/ACT inhaler Inhale 2 puffs.   3/3/2020 at Unknown time   • amLODIPine (NORVASC) 5 MG tablet Take 1 tablet by mouth Daily. 30 tablet 11 3/4/2020 at Unknown time   • aspirin 81 MG EC tablet Take 81 mg by mouth Daily.   3/3/2020 at Unknown time   • atorvastatin (LIPITOR) 20 MG tablet    3/3/2020 at Unknown time   • budesonide-formoterol (SYMBICORT) 160-4.5 MCG/ACT inhaler Inhale 2 puffs.   3/3/2020 at Unknown time   • clopidogrel (PLAVIX) 75 MG tablet    3/3/2020 at Unknown time   • furosemide (LASIX) 40 MG tablet Take 40 mg by mouth Daily.   3/3/2020 at Unknown time   • lisinopril (PRINIVIL,ZESTRIL) 40 MG tablet Take 40 mg by mouth Daily.   3/4/2020 at Unknown time   • metFORMIN (GLUCOPHAGE) 500 MG tablet Take 500 mg by mouth Daily With Breakfast.   3/3/2020 at Unknown time   • rOPINIRole (REQUIP) 4 MG tablet    3/3/2020 at Unknown time   • tiotropium (SPIRIVA) 18 MCG per inhalation capsule Place 1 capsule into inhaler and inhale Daily.   3/3/2020 at Unknown time       Current Meds  Scheduled Meds:  amLODIPine 5 mg Oral Daily   aspirin 81 mg Oral Daily   atorvastatin 40 mg Oral Nightly   budesonide-formoterol 2 puff Inhalation BID - RT   clopidogrel 75 mg Oral Daily   folic acid 1 mg Oral Daily   furosemide 40 mg Oral Daily   insulin lispro 0-7 Units Subcutaneous 4x Daily With Meals & Nightly   ipratropium-albuterol 3 mL Nebulization Q4H - RT   metoprolol tartrate 25 mg Oral Q12H   multivitamin 1 tablet Oral Daily   nicotine 1 patch Transdermal Q24H   [START ON 3/6/2020]  pantoprazole 40 mg Oral Q AM   rOPINIRole 4 mg Oral Nightly   thiamine 100 mg Oral Daily     Continuous Infusions:  sodium chloride 75 mL/hr Last Rate: 75 mL/hr (03/04/20 1531)     PRN Meds:.•  acetaminophen  •  chlordiazePOXIDE  •  magnesium sulfate **OR** magnesium sulfate in D5W 1g/100mL (PREMIX) **OR** magnesium sulfate  •  potassium chloride **OR** potassium chloride **OR** potassium chloride    Allergies as of 02/27/2020 - Reviewed 02/27/2020   Allergen Reaction Noted   • Penicillins  10/22/2015   • Sulfa antibiotics  10/22/2015       Social History     Socioeconomic History   • Marital status:      Spouse name: Not on file   • Number of children: Not on file   • Years of education: Not on file   • Highest education level: Not on file   Tobacco Use   • Smoking status: Current Every Day Smoker     Packs/day: 2.00   • Smokeless tobacco: Never Used   Substance and Sexual Activity   • Alcohol use: Yes     Comment: DRINKS 4-5 BEERS DAILY   • Drug use: No   • Sexual activity: Defer       Family History   Problem Relation Age of Onset   • Arrhythmia Mother    • Heart attack Maternal Grandfather    • Heart attack Paternal Grandfather    • Hypertension Neg Hx    • Hyperlipidemia Neg Hx    • Heart failure Neg Hx    • Heart disease Neg Hx        REVIEW OF SYSTEMS:   ROS was performed and is negative except as outlined in HPI     REVIEW OF SYSTEMS:   CONSTITUTIONAL: No weight loss, fever, chills, weakness or fatigue.   HEENT: Eyes: No visual loss, blurred vision, double vision or yellow sclerae. Ears, Nose, Throat: No hearing loss, sneezing, congestion, runny nose or sore throat.   SKIN: No rash or itching.     RESPIRATORY: No shortness of breath, hemoptysis, cough or sputum.   GASTROINTESTINAL: No anorexia, nausea, vomiting or diarrhea. No abdominal pain, bright red blood per rectum or melena.  GENITOURINARY: No burning on urination, hematuria or increased frequency.  NEUROLOGICAL: No headache, dizziness, syncope,  "paralysis, ataxia, numbness or tingling in the extremities. No change in bowel or bladder control.   MUSCULOSKELETAL: No muscle, back pain, joint pain or stiffness.   HEMATOLOGIC: No anemia, bleeding or bruising.   LYMPHATICS: No enlarged nodes. No history of splenectomy.   PSYCHIATRIC: No history of depression, anxiety, hallucinations.   ENDOCRINOLOGIC: No reports of sweating, cold or heat intolerance. No polyuria or polydipsia.       Objective:   Temp:  [98 °F (36.7 °C)-98.7 °F (37.1 °C)] 98.1 °F (36.7 °C)  Heart Rate:  [] 87  Resp:  [18-20] 18  BP: (113-161)/(63-98) 135/85  Body mass index is 35.22 kg/m².  Flowsheet Rows      First Filed Value   Admission Height  175.3 cm (69\") Documented at 03/04/2020 1053   Admission Weight  108 kg (238 lb 8 oz) Documented at 03/04/2020 1053        Vitals:    03/05/20 1605   BP: 135/85   Pulse: 87   Resp: 18   Temp: 98.1 °F (36.7 °C)   SpO2: 92%       Head:    Normocephalic, without obvious abnormality, atraumatic   Eyes:            Lids and lashes normal, conjunctivae and sclerae normal, no   icterus, no pallor   Ears:    Ears appear intact with no abnormalities noted   Throat:   No oral lesions, dentition good   Neck:   No adenopathy, supple, trachea midline, no thyromegaly, no   carotid bruit, no JVD   Lungs:     Breath sounds are equal and clear to auscultation    Heart:    Normal S1 and S2, RRR, late peaking 3 out of 6 systolic ejection /G/R   Abdomen:     Normal bowel sounds, no masses, no organomegaly, soft        non-tender, non-distended, no guarding, very obese   Extremities:   Moves all extremities well, no edema, no cyanosis, no redness   Pulses:   Pulses palpable and equal bilaterally.    Skin:  Psychiatric:   No bleeding, bruising or rash    Awake, alert and oriented x 3, normal mood and affect             I personally viewed and interpreted the patient's EKG/Telemetry data    Assessment:  Active Hospital Problems    Diagnosis  POA   • **SOB (shortness of " breath) [R06.02]  Unknown   • Nonrheumatic aortic valve stenosis [I35.0]  Unknown   • Aortic valve stenosis [I35.0]  Yes      Resolved Hospital Problems   No resolved problems to display.       Plan: He is a gentleman with severe symptomatic degenerative aortic stenosis.  He has at least moderate if not high risk because of his pulmonary status and overall condition and obesity.  He has been turned down for surgical aortic valve replacement by Dr. Sacnhez.  I think we should initiate evaluation for transaortic valve replacement.  I had a long discussion with him and his family going over the risks of TAVR with him.  And we are going to move forward with a CAT scan he does have a history of peripheral vascular disease and has had stenting done so we will have to see hopefully we can get access through his transfemoral route if not we can look at all or alternative access for him    Brandon Montenegro MD  03/05/20  4:58 PM.

## 2020-03-05 NOTE — PROGRESS NOTES
[facility]  Cardiology Progress Note    Patient Identification:  Name: Fili Ladd  Age: 69 y.o.  Sex: male  :  1950  MRN: [id number]                 Follow Up / Chief Complaint: Follow-up severe aortic stenosis    Interval History: CT surgery opinion regarding possible AVR pending.       Subjective: Positive for CAMPOVERDE and chest tightness on exertion      Objective:    Past Medical History:  Past Medical History:   Diagnosis Date   • COPD (chronic obstructive pulmonary disease) (CMS/[facility])    • Diabetes mellitus (CMS/[facility])    • Heart murmur    • Hyperlipidemia    • Hypertension    • Valvular disease      Past Surgical History:  Past Surgical History:   Procedure Laterality Date   • CARDIAC CATHETERIZATION N/A 3/4/2020    Procedure: right and Left Heart Cath,;  Surgeon: Paulino Mariee MD;  Location:  [facility]VAS[facility];  Service: Cardiology;  Laterality: N/A;   • CARDIAC CATHETERIZATION N/A 3/4/2020    Procedure: Coronary angiography;  Surgeon: Paulino Mariee MD;  Location:  [facility];  Service: Cardiology;  Laterality: N/A;   • CARDIAC CATHETERIZATION N/A 3/4/2020    Procedure: Left ventriculography;  Surgeon: Paulino Mariee MD;  Location:  [facility];  Service: Cardiology;  Laterality: N/A;   • HERNIA REPAIR     • HERNIA REPAIR     • ILIAC ARTERY STENT  2019   • POPLITEAL ARTERY STENT Left 2019   • POPLITEAL ARTERY STENT Right 2019        Social History:   Social History     Tobacco Use   • Smoking status: Current Every Day Smoker     Packs/day: 2.00   • Smokeless tobacco: Never Used   Substance Use Topics   • Alcohol use: Yes     Comment: DRINKS 4-5 BEERS DAILY      Family History:  Family History   Problem Relation Age of Onset   • Arrhythmia Mother    • Heart attack Maternal Grandfather    • Heart attack Paternal Grandfather    • Hypertension Neg Hx    • Hyperlipidemia Neg Hx    • Heart failure Neg  "Hx    • Heart disease Neg Hx           Allergies:  Allergies   Allergen Reactions   • Penicillins    • Sulfa Antibiotics      Scheduled Meds:    amLODIPine 5 mg Daily   aspirin 81 mg Daily   atorvastatin 40 mg Nightly   budesonide-formoterol 2 puff BID - RT   chlorhexidine 15 mL Q12H   Chlorhexidine Gluconate Cloth 1 application Q12H   folic acid 1 mg Daily   furosemide 40 mg Daily   [START ON 3/6/2020] metoprolol tartrate 12.5 mg On Call to OR   metoprolol tartrate 25 mg Q12H   multivitamin 1 tablet Daily   mupirocin 1 application Q12H   nicotine 1 patch Q24H   thiamine 100 mg Daily   tiotropium bromide monohydrate 2 puff Daily - RT   vancomycin 15 mg/kg On Call to OR     I have reviewed the patient's recent medical history and current medications, as well as personally reviewed/interpreted the ECG/telemetry data    INTAKE AND OUTPUT:    Intake/Output Summary (Last 24 hours) at 3/5/2020 1056  Last data filed at 3/4/2020 2330  Gross per 24 hour   Intake 2100 ml   Output 300 ml   Net 1800 ml       ROS  Constitutional: Awake and alert, no fever. No nosebleeds  Abdomen           no abdominal pain   Cardiac              no chest pain  Pulmonary          no shortness of breath      /85   Pulse 87   Temp 98.1 °F (36.7 °C) (Oral)   Resp 18   Ht 175.3 cm (69\")   Wt 108 kg (238 lb 8 oz)   SpO2 92%   BMI 35.22 kg/m²   General appearance: No acute changes   Neck: Trachea midline; NECK, supple, no thyromegaly or lymphadenopathy   Lungs: Normal size and shape, normal breath sounds, equal distribution of air, no rales and rhonchi   CV: S1-S2 regular, no murmurs, no rub, no gallop   Abdomen: Soft, non-tender; no masses , no abnormal abdominal sounds   Extremities: No deformity , normal color , no peripheral edema   Skin: Normal temperature, turgor and texture; no rash, ulcers            Cardiographics  Telemetry:   3/5/20 SR rate 70s      ECG:   3/5/20 SR with PVCs rate 90s        Echocardiogram: "   2/19/20  Interpretation Summary     · There is calcification of the aortic valve.  · Severe aortic valve stenosis is present.  · Mild mitral valve regurgitation is present  · Left atrial cavity size is mildly dilated.  · Calculated EF = 64%  · There is no evidence of pericardial effusion.          Cardiac catheterization  3/4/20  HEMODYNAMIC / ANGIOGRAPHIC DATA:    1. Pulmonary capillary wedge pressure was 13.   2. Pulmonary artery systolic pressure was 53/31/34.  3. Right atrial pressure was 12.  4. Cardiac index was 2.53.  5. Left ventricular end diastolic pressure was 12 mmHg.  6. Left ventriculography revealed the EF to be 60%.  7. The left main is , normal left main  8. The LAD is .Left anterior descending shows midportion 50% stenosis with early atherosclerotic plaque of the diagonal and  branches  9. Circumflex artery ostial 60 to 70% stenosis with normal distal flow  10. Right coronary artery is a dominant with early atherosclerotic plaque  11. Severe aortic gradient of 50 mmHg with a severe aortic stenosis with a valve area of 0.75  12. Mild pulmonary hypertension with a PA pressures around 49 to 50 m of mercury     RECOMMENDATIONS:  Post-procedure care will focus on prevention of any ischemic events and congestive complications.      Lab Review       Results from last 7 days   Lab Units 03/05/20  0318   MAGNESIUM mg/dL 1.6     Results from last 7 days   Lab Units 03/05/20  0318   SODIUM mmol/L 137   POTASSIUM mmol/L 4.1   BUN mg/dL 12   CREATININE mg/dL 0.92   CALCIUM mg/dL 9.7       Results from last 7 days   Lab Units 03/05/20  0318 03/04/20  1223 03/04/20  1219   WBC 10*3/mm3 6.58  --   --    HEMOGLOBIN g/dL 14.0  --   --    HEMOGLOBIN, POC g/dL  --  15.3 15.3   HEMATOCRIT % 42.6  --   --    HEMATOCRIT POC %  --  45 45   PLATELETS 10*3/mm3 202  --   --      Results from last 7 days   Lab Units 03/05/20  0318   INR  1.02   APTT seconds 33.8     Estimated Creatinine Clearance: 93 mL/min (by  "C-G formula based on SCr of 0.92 mg/dL).    I have reviewed the medical decision making with Dr. Mariee in detail.     Assessment:  1.  Severe aortic stenosis  2.  CAD  3.  COPD  4.  Hypertension  5.  Hyperlipidemia  6.  DM2  7.  Hypomagnesemia    Plan:  Cardiac catheterization March 4 revealed PA systolic pressure 53/31/34 with LV end-diastolic pressure 12 mmHg.  Left main normal, LAD with 50% midportion stenosis, left circumflex with ostial 60 to 70% stenosis, RCA dominant with early plaque.  Severe aortic gradient of 50 mmHg with DEXTER of 0.75, CT surgery plan pending.  Last dose of Plavix 3/3/2020.  Mag low today at 1.6, will replace.  LDL poorly controlled at 154, will increase atorvastatin.  Blood pressure currently stable and controlled, sinus rhythm on telemetry.  Patient does have some BLE edema, p.o. Lasix resumed today.  Check labs in a.m. diabetes very poorly controlled with hemoglobin A1c of 8.16, will ask internal medicine to see the patient.      Labs/tests ordered: Increase atorvastatin, replace magnesium, continue p.o. Lasix, consult IM.       )3/5/2020  PHONG Palma      Patient personally interviewed and above subjective findings personally confirmed during a face to face contact with patient today  All findings of physical examination confirmed  All pertinent and performed labs, cardiac procedures ,  radiographs of the last 24 hours personally reviewed  Impression and plans discussed/elaborated and implemented jointly as described above     Paulino Mariee MD          EMR Dragon/Transcription:   \"Dictated utilizing Dragon dictation\".   "

## 2020-03-06 ENCOUNTER — APPOINTMENT (OUTPATIENT)
Dept: CT IMAGING | Facility: HOSPITAL | Age: 70
End: 2020-03-06

## 2020-03-06 LAB
ALBUMIN SERPL-MCNC: 3.5 G/DL (ref 3.5–5.2)
ALBUMIN/GLOB SERPL: 1 G/DL
ALP SERPL-CCNC: 72 U/L (ref 39–117)
ALT SERPL W P-5'-P-CCNC: 17 U/L (ref 1–41)
ANION GAP SERPL CALCULATED.3IONS-SCNC: 11.5 MMOL/L (ref 5–15)
AST SERPL-CCNC: 19 U/L (ref 1–40)
BASOPHILS # BLD AUTO: 0.05 10*3/MM3 (ref 0–0.2)
BASOPHILS NFR BLD AUTO: 0.7 % (ref 0–1.5)
BILIRUB SERPL-MCNC: 0.3 MG/DL (ref 0.2–1.2)
BUN BLD-MCNC: 13 MG/DL (ref 8–23)
BUN/CREAT SERPL: 15.7 (ref 7–25)
CALCIUM SPEC-SCNC: 10.2 MG/DL (ref 8.6–10.5)
CHLORIDE SERPL-SCNC: 90 MMOL/L (ref 98–107)
CHOLEST SERPL-MCNC: 220 MG/DL (ref 0–200)
CO2 SERPL-SCNC: 34.5 MMOL/L (ref 22–29)
CREAT BLD-MCNC: 0.83 MG/DL (ref 0.76–1.27)
DEPRECATED RDW RBC AUTO: 39.5 FL (ref 37–54)
EOSINOPHIL # BLD AUTO: 0.46 10*3/MM3 (ref 0–0.4)
EOSINOPHIL NFR BLD AUTO: 6.7 % (ref 0.3–6.2)
ERYTHROCYTE [DISTWIDTH] IN BLOOD BY AUTOMATED COUNT: 12.7 % (ref 12.3–15.4)
GFR SERPL CREATININE-BSD FRML MDRD: 92 ML/MIN/1.73
GLOBULIN UR ELPH-MCNC: 3.4 GM/DL
GLUCOSE BLD-MCNC: 163 MG/DL (ref 65–99)
GLUCOSE BLDC GLUCOMTR-MCNC: 146 MG/DL (ref 70–130)
GLUCOSE BLDC GLUCOMTR-MCNC: 148 MG/DL (ref 70–130)
GLUCOSE BLDC GLUCOMTR-MCNC: 161 MG/DL (ref 70–130)
GLUCOSE BLDC GLUCOMTR-MCNC: 184 MG/DL (ref 70–130)
HCT VFR BLD AUTO: 42.5 % (ref 37.5–51)
HDLC SERPL-MCNC: 35 MG/DL (ref 40–60)
HGB BLD-MCNC: 14.2 G/DL (ref 13–17.7)
IMM GRANULOCYTES # BLD AUTO: 0.03 10*3/MM3 (ref 0–0.05)
IMM GRANULOCYTES NFR BLD AUTO: 0.4 % (ref 0–0.5)
LDLC SERPL CALC-MCNC: 155 MG/DL (ref 0–100)
LDLC/HDLC SERPL: 4.44 {RATIO}
LYMPHOCYTES # BLD AUTO: 1.24 10*3/MM3 (ref 0.7–3.1)
LYMPHOCYTES NFR BLD AUTO: 18.1 % (ref 19.6–45.3)
MAGNESIUM SERPL-MCNC: 1.6 MG/DL (ref 1.6–2.4)
MCH RBC QN AUTO: 28.7 PG (ref 26.6–33)
MCHC RBC AUTO-ENTMCNC: 33.4 G/DL (ref 31.5–35.7)
MCV RBC AUTO: 85.9 FL (ref 79–97)
MONOCYTES # BLD AUTO: 0.78 10*3/MM3 (ref 0.1–0.9)
MONOCYTES NFR BLD AUTO: 11.4 % (ref 5–12)
NEUTROPHILS # BLD AUTO: 4.28 10*3/MM3 (ref 1.7–7)
NEUTROPHILS NFR BLD AUTO: 62.7 % (ref 42.7–76)
NRBC BLD AUTO-RTO: 0 /100 WBC (ref 0–0.2)
NT-PROBNP SERPL-MCNC: 535 PG/ML (ref 5–900)
PLATELET # BLD AUTO: 210 10*3/MM3 (ref 140–450)
PMV BLD AUTO: 9 FL (ref 6–12)
POTASSIUM BLD-SCNC: 4 MMOL/L (ref 3.5–5.2)
PROCALCITONIN SERPL-MCNC: 0.05 NG/ML (ref 0.1–0.25)
PROT SERPL-MCNC: 6.9 G/DL (ref 6–8.5)
RBC # BLD AUTO: 4.95 10*6/MM3 (ref 4.14–5.8)
SODIUM BLD-SCNC: 136 MMOL/L (ref 136–145)
TRIGL SERPL-MCNC: 148 MG/DL (ref 0–150)
TSH SERPL DL<=0.05 MIU/L-ACNC: 1.82 UIU/ML (ref 0.27–4.2)
VLDLC SERPL-MCNC: 29.6 MG/DL (ref 5–40)
WBC NRBC COR # BLD: 6.84 10*3/MM3 (ref 3.4–10.8)

## 2020-03-06 PROCEDURE — 94799 UNLISTED PULMONARY SVC/PX: CPT

## 2020-03-06 PROCEDURE — 86900 BLOOD TYPING SEROLOGIC ABO: CPT

## 2020-03-06 PROCEDURE — 94660 CPAP INITIATION&MGMT: CPT

## 2020-03-06 PROCEDURE — 63710000001 INSULIN LISPRO (HUMAN) PER 5 UNITS: Performed by: HOSPITALIST

## 2020-03-06 PROCEDURE — 99232 SBSQ HOSP IP/OBS MODERATE 35: CPT | Performed by: INTERNAL MEDICINE

## 2020-03-06 PROCEDURE — 99231 SBSQ HOSP IP/OBS SF/LOW 25: CPT | Performed by: NURSE PRACTITIONER

## 2020-03-06 PROCEDURE — 83880 ASSAY OF NATRIURETIC PEPTIDE: CPT | Performed by: HOSPITALIST

## 2020-03-06 PROCEDURE — 80061 LIPID PANEL: CPT | Performed by: HOSPITALIST

## 2020-03-06 PROCEDURE — 84443 ASSAY THYROID STIM HORMONE: CPT | Performed by: HOSPITALIST

## 2020-03-06 PROCEDURE — 85025 COMPLETE CBC W/AUTO DIFF WBC: CPT | Performed by: HOSPITALIST

## 2020-03-06 PROCEDURE — 84145 PROCALCITONIN (PCT): CPT | Performed by: INTERNAL MEDICINE

## 2020-03-06 PROCEDURE — 86901 BLOOD TYPING SEROLOGIC RH(D): CPT

## 2020-03-06 PROCEDURE — 83735 ASSAY OF MAGNESIUM: CPT | Performed by: NURSE PRACTITIONER

## 2020-03-06 PROCEDURE — 0 IOPAMIDOL PER 1 ML: Performed by: INTERNAL MEDICINE

## 2020-03-06 PROCEDURE — 25010000002 MAGNESIUM SULFATE IN D5W 1G/100ML (PREMIX) 1-5 GM/100ML-% SOLUTION: Performed by: NURSE PRACTITIONER

## 2020-03-06 PROCEDURE — 82962 GLUCOSE BLOOD TEST: CPT

## 2020-03-06 PROCEDURE — 80053 COMPREHEN METABOLIC PANEL: CPT | Performed by: HOSPITALIST

## 2020-03-06 PROCEDURE — 71275 CT ANGIOGRAPHY CHEST: CPT

## 2020-03-06 RX ORDER — ATORVASTATIN CALCIUM 80 MG/1
80 TABLET, FILM COATED ORAL NIGHTLY
Status: DISCONTINUED | OUTPATIENT
Start: 2020-03-06 | End: 2020-03-07 | Stop reason: HOSPADM

## 2020-03-06 RX ORDER — MAGNESIUM SULFATE 1 G/100ML
1 INJECTION INTRAVENOUS
Status: COMPLETED | OUTPATIENT
Start: 2020-03-06 | End: 2020-03-06

## 2020-03-06 RX ORDER — SODIUM CHLORIDE FOR INHALATION 7 %
4 VIAL, NEBULIZER (ML) INHALATION
Status: DISCONTINUED | OUTPATIENT
Start: 2020-03-06 | End: 2020-03-07 | Stop reason: HOSPADM

## 2020-03-06 RX ADMIN — SODIUM CHLORIDE 4 ML: 7 NEBU SOLN,3 % NEBU at 20:45

## 2020-03-06 RX ADMIN — NICOTINE 1 PATCH: 7 PATCH, EXTENDED RELEASE TRANSDERMAL at 09:43

## 2020-03-06 RX ADMIN — ASPIRIN 81 MG: 81 TABLET, CHEWABLE ORAL at 09:43

## 2020-03-06 RX ADMIN — MAGNESIUM SULFATE 1 G: 1 INJECTION INTRAVENOUS at 16:01

## 2020-03-06 RX ADMIN — IPRATROPIUM BROMIDE AND ALBUTEROL SULFATE 3 ML: 2.5; .5 SOLUTION RESPIRATORY (INHALATION) at 20:44

## 2020-03-06 RX ADMIN — INSULIN LISPRO 2 UNITS: 100 INJECTION, SOLUTION INTRAVENOUS; SUBCUTANEOUS at 16:02

## 2020-03-06 RX ADMIN — MAGNESIUM SULFATE 1 G: 1 INJECTION INTRAVENOUS at 17:06

## 2020-03-06 RX ADMIN — FOLIC ACID 1 MG: 1 TABLET ORAL at 09:43

## 2020-03-06 RX ADMIN — IPRATROPIUM BROMIDE AND ALBUTEROL SULFATE 3 ML: 2.5; .5 SOLUTION RESPIRATORY (INHALATION) at 11:34

## 2020-03-06 RX ADMIN — ATORVASTATIN CALCIUM 80 MG: 80 TABLET, FILM COATED ORAL at 20:25

## 2020-03-06 RX ADMIN — BUDESONIDE 0.5 MG: 0.5 INHALANT RESPIRATORY (INHALATION) at 11:35

## 2020-03-06 RX ADMIN — AMLODIPINE BESYLATE 5 MG: 5 TABLET ORAL at 09:43

## 2020-03-06 RX ADMIN — CLOPIDOGREL 75 MG: 75 TABLET, FILM COATED ORAL at 09:42

## 2020-03-06 RX ADMIN — INSULIN LISPRO 2 UNITS: 100 INJECTION, SOLUTION INTRAVENOUS; SUBCUTANEOUS at 20:25

## 2020-03-06 RX ADMIN — Medication 100 MG: at 09:43

## 2020-03-06 RX ADMIN — ROPINIROLE 4 MG: 2 TABLET, FILM COATED ORAL at 20:25

## 2020-03-06 RX ADMIN — IPRATROPIUM BROMIDE AND ALBUTEROL SULFATE 3 ML: 2.5; .5 SOLUTION RESPIRATORY (INHALATION) at 15:19

## 2020-03-06 RX ADMIN — FUROSEMIDE 40 MG: 40 TABLET ORAL at 09:43

## 2020-03-06 RX ADMIN — METOPROLOL TARTRATE 25 MG: 25 TABLET ORAL at 09:42

## 2020-03-06 RX ADMIN — IOPAMIDOL 95 ML: 755 INJECTION, SOLUTION INTRAVENOUS at 10:15

## 2020-03-06 RX ADMIN — Medication 1 TABLET: at 09:43

## 2020-03-06 RX ADMIN — METOPROLOL TARTRATE 25 MG: 25 TABLET ORAL at 20:25

## 2020-03-06 RX ADMIN — PANTOPRAZOLE SODIUM 40 MG: 40 TABLET, DELAYED RELEASE ORAL at 06:05

## 2020-03-06 RX ADMIN — BUDESONIDE 0.5 MG: 0.5 INHALANT RESPIRATORY (INHALATION) at 20:45

## 2020-03-06 NOTE — PROGRESS NOTES
" LOS: 2 days   Patient Care Team:  Micah Michel MD as PCP - General (Internal Medicine)  Marya Boyd APRN as PCP - Claims Attributed    Chief Complaint: aortic stenosis    Subjective:  Symptoms:  He reports cough.  No shortness of breath or chest pain.    Diet:  No nausea or vomiting.    Activity level: Returning to normal.    Pain:  He reports no pain.      Vital Signs  Temp:  [97.6 °F (36.4 °C)-98.1 °F (36.7 °C)] 97.6 °F (36.4 °C)  Heart Rate:  [] 110  Resp:  [16-20] 16  BP: (125-149)/(66-85) 125/73  Body mass index is 35.22 kg/m².    Intake/Output Summary (Last 24 hours) at 3/6/2020 1036  Last data filed at 3/5/2020 2100  Gross per 24 hour   Intake 100 ml   Output 200 ml   Net -100 ml     No intake/output data recorded.          03/04/20  1053   Weight: 108 kg (238 lb 8 oz)     Objective:  General Appearance:  Comfortable and in no acute distress.    Vital signs: (most recent): Blood pressure 141/86, pulse 84, temperature 97.8 °F (36.6 °C), temperature source Oral, resp. rate 20, height 175.3 cm (69\"), weight 108 kg (238 lb 8 oz), SpO2 92 %.  Vital signs are normal.  No fever.    Output: Producing urine.    Lungs:  Normal effort and normal respiratory rate.  There are decreased breath sounds and wheezes.    Heart: Normal rate.  Regular rhythm.  Positive for murmur.  (SR on tele monitor with frequent PACs)  Abdomen: Abdomen is soft.  Bowel sounds are normal.     Extremities: There is dependent edema.  (Mild edema bilateral LE)  Pulses: Distal pulses are intact.    Neurological: Patient is alert and oriented to person, place and time.    Skin:  Warm and dry.  (Generalized redness)          Results Review:        WBC WBC   Date Value Ref Range Status   03/06/2020 6.84 3.40 - 10.80 10*3/mm3 Final   03/05/2020 6.58 3.40 - 10.80 10*3/mm3 Final      HGB Hemoglobin   Date Value Ref Range Status   03/06/2020 14.2 13.0 - 17.7 g/dL Final   03/05/2020 14.0 13.0 - 17.7 g/dL Final   03/04/2020 15.3 12.0 - 17.0 g/dL " Final   03/04/2020 15.3 12.0 - 17.0 g/dL Final      HCT Hematocrit   Date Value Ref Range Status   03/06/2020 42.5 37.5 - 51.0 % Final   03/05/2020 42.6 37.5 - 51.0 % Final   03/04/2020 45 38 - 51 % Final   03/04/2020 45 38 - 51 % Final      Platelets Platelets   Date Value Ref Range Status   03/06/2020 210 140 - 450 10*3/mm3 Final   03/05/2020 202 140 - 450 10*3/mm3 Final        PT/INR:    Protime   Date Value Ref Range Status   03/05/2020 13.1 11.7 - 14.2 Seconds Final   /  INR   Date Value Ref Range Status   03/05/2020 1.02 0.90 - 1.10 Final       Sodium Sodium   Date Value Ref Range Status   03/06/2020 136 136 - 145 mmol/L Final   03/05/2020 137 136 - 145 mmol/L Final      Potassium Potassium   Date Value Ref Range Status   03/06/2020 4.0 3.5 - 5.2 mmol/L Final   03/05/2020 4.1 3.5 - 5.2 mmol/L Final      Chloride Chloride   Date Value Ref Range Status   03/06/2020 90 (L) 98 - 107 mmol/L Final   03/05/2020 93 (L) 98 - 107 mmol/L Final      Bicarbonate CO2   Date Value Ref Range Status   03/06/2020 34.5 (H) 22.0 - 29.0 mmol/L Final   03/05/2020 31.9 (H) 22.0 - 29.0 mmol/L Final      BUN BUN   Date Value Ref Range Status   03/06/2020 13 8 - 23 mg/dL Final   03/05/2020 12 8 - 23 mg/dL Final      Creatinine Creatinine   Date Value Ref Range Status   03/06/2020 0.83 0.76 - 1.27 mg/dL Final   03/05/2020 0.92 0.76 - 1.27 mg/dL Final      Calcium Calcium   Date Value Ref Range Status   03/06/2020 10.2 8.6 - 10.5 mg/dL Final   03/05/2020 9.7 8.6 - 10.5 mg/dL Final      Magnesium Magnesium   Date Value Ref Range Status   03/06/2020 1.6 1.6 - 2.4 mg/dL Final   03/05/2020 1.6 1.6 - 2.4 mg/dL Final            amLODIPine 5 mg Oral Daily   aspirin 81 mg Oral Daily   atorvastatin 40 mg Oral Nightly   budesonide 0.5 mg Nebulization BID - RT   clopidogrel 75 mg Oral Daily   folic acid 1 mg Oral Daily   furosemide 40 mg Oral Daily   insulin lispro 0-7 Units Subcutaneous 4x Daily With Meals & Nightly   ipratropium-albuterol 3 mL  Nebulization Q4H - RT   metoprolol tartrate 25 mg Oral Q12H   multivitamin 1 tablet Oral Daily   nicotine 1 patch Transdermal Q24H   pantoprazole 40 mg Oral Q AM   rOPINIRole 4 mg Oral Nightly   thiamine 100 mg Oral Daily            Patient Active Problem List   Diagnosis Code   • Aortic valve stenosis I35.0   • Chronic obstructive pulmonary disease (CMS/[facility]) J44.9   • Essential hypertension I10   • Hyperlipidemia E78.5   • SOB (shortness of breath) R06.02   • Overweight E66.3   • Nonrheumatic aortic valve stenosis I35.0       Assessment & Plan   -Severe aortic stenosis-possible TAVR work up  - CAD--medical management  -Obesity   -probable sleep apnea  -current tobacco abuse--nicotine patch in place  -ETOH 4-5 beers a day  -COPD  -Diabetes type II- A1C 8.13  -hypertension  -PAD- s/p popliteal artery stent- plavix restarted   -nocturnal oxygen at home     Due to patients pulmonary status it was decided that he was a better candidate for TAVR rather than SAVR--TAVR team now involved  Elevated D-dimer overnight--CTA of chest pending  [facility] protocol for possible alcohol withdrawal--will add vitamins/PRN librium  Will probably eventually need TAVR CTA--given patients vascular history to see if we would have femoral access    January Stout, APRN  03/06/20  10:36 AM     Significant pulmonary compromise.  Borderline coronary disease.  It would be appropriate to consider him for TAVR.  Unfortunately, he just underwent a PE protocol CT scan.  Once kidneys have recovered he can have a TAVR protocol CTA.  I do not believe he needs to stay in house to wait for this.  We will confer with all other services.

## 2020-03-06 NOTE — PROGRESS NOTES
Pulmonary  Patient said d-dimer was elevated I did discuss with Dr. Avitia he does feel given the patient's history of dyspnea that he needs a CT angiogram to rule out pulmonary embolus.  Ordered that last night apparently there was problem with the IV they are going to do it this morning just spoke with the nurse.

## 2020-03-06 NOTE — PROGRESS NOTES
Manan Rodriguez MD                          [phone]      Patient ID:    Name:  Fili Ladd    MRN:  [id number]    1950   69 y.o.  male            Patient Care Team:  Micah Michel MD as PCP - General (Internal Medicine)  Marya Boyd APRN as PCP - Claims Attributed    CC/ Reason for visit: Respiratory failure, COPD, tobacco abuse    Subjective: Pt seen and examined this AM. No acute overnight events noted. Doing better.  Continues to use the bronchodilators with good response.  CT angiogram done due to elevated d-dimer yesterday.  No worsening respiratory symptoms.    ROS: Denies any subjective fevers, syncope or presyncopal events, new neurological deficits, nausea or vomiting currently    Objective     Vital Signs past 24hrs    BP range: BP: (125-149)/(66-86) 141/86  Pulse range: Heart Rate:  [] 84  Resp rate range: Resp:  [16-20] 20  Temp range: Temp (24hrs), Av.8 °F (36.6 °C), Min:97.6 °F (36.4 °C), Max:98.1 °F (36.7 °C)      Ventilator/Non-Invasive Ventilation Settings (From admission, onward)     Start     Ordered    20 1729  NIPPV (CPAP or BIPAP)  At Bedtime & As Needed-RT     Question Answer Comment   Indication: Sleep Apnea    Type: AutoBIPAP    NIPPV Mask Interface: Per Patient Preference    Max IPAP 30    Min EPAP 5    Min Pressure Support 5    Max Pressure Support 25    Titrate for SPO2 90%        20 1728                Device (Oxygen Therapy): nasal cannula       108 kg (238 lb 8 oz); Body mass index is 35.22 kg/m².      Intake/Output Summary (Last 24 hours) at 3/6/2020 1441  Last data filed at 3/5/2020 2100  Gross per 24 hour   Intake 100 ml   Output 200 ml   Net -100 ml       PHYSICAL EXAM   Constitutional: Middle aged morbidly obese white male pt in bed -appears much older than stated age, No acute respiratory distress, + accessory muscle use  Head: - NCAT  Eyes: No pallor, Anicteric conjunctiva, EOMI.  ENMT:  Mallampati 4,  no oral thrush. Moist MM.   NECK: Trachea midline, No thyromegaly, no palpable cervical LNpathy  Heart: RRR, + syst murmur. Trace pedal edema   Lungs: MAILE +, distant breath sounds no wheezes/ crackles heard    Abdomen: Soft.  Obese, no tenderness, guarding or rigidity. No palpable masses  Extremities: Extremities warm and well perfused. No cyanosis/ clubbing  Neuro: Conscious, answers appropriately, no gross focal neuro deficits  Psych: Mood and affect appropriate    Scheduled meds:    amLODIPine 5 mg Oral Daily   aspirin 81 mg Oral Daily   atorvastatin 80 mg Oral Nightly   budesonide 0.5 mg Nebulization BID - RT   clopidogrel 75 mg Oral Daily   folic acid 1 mg Oral Daily   furosemide 40 mg Oral Daily   insulin lispro 0-7 Units Subcutaneous 4x Daily With Meals & Nightly   ipratropium-albuterol 3 mL Nebulization Q4H - RT   metoprolol tartrate 25 mg Oral Q12H   multivitamin 1 tablet Oral Daily   nicotine 1 patch Transdermal Q24H   pantoprazole 40 mg Oral Q AM   rOPINIRole 4 mg Oral Nightly   thiamine 100 mg Oral Daily       IV meds:                           Data Review:      Results from last 7 days   Lab Units 03/06/20  0438 03/05/20  0318 03/04/20  1223   SODIUM mmol/L 136 137  --    POTASSIUM mmol/L 4.0 4.1  --    CHLORIDE mmol/L 90* 93*  --    CO2 mmol/L 34.5* 31.9*  --    BUN mg/dL 13 12  --    CREATININE mg/dL 0.83 0.92  --    CALCIUM mg/dL 10.2 9.7  --    BILIRUBIN mg/dL 0.3 0.2  --    ALK PHOS U/L 72 72  --    ALT (SGPT) U/L 17 14  --    AST (SGOT) U/L 19 16  --    GLUCOSE mg/dL 163* 161*  --    WBC 10*3/mm3 6.84 6.58  --    HEMOGLOBIN g/dL 14.2 14.0  --    HEMOGLOBIN, POC g/dL  --   --  15.3   PLATELETS 10*3/mm3 210 202  --    INR   --  1.02  --    PROBNP pg/mL 535.0 450.5  --        Lab Results   Component Value Date    CALCIUM 10.2 03/06/2020             Results from last 7 days   Lab Units 03/04/20  1915   PH, ARTERIAL pH units 7.381   PO2 ART mm Hg 73.4*   PCO2, ARTERIAL mm Hg 57.2*   HCO3 ART mmol/L  33.9*        Results Review:    I have reviewed the relevant laboratory results and independently reviewed the chest imaging from this hospitalization including the available echocardiogram reports personally and summarized it if/ when appropriate below    Assessment    Acute on chronic hypoxic respiratory failure (Nocturnal o2)  Chronic hypercapnic respiratory failure  LLL atelectasis/ pna   Small Lt pl effusion   Nonspecific mediastinal lymphadenopathy; needs outpatient follow-up CT in 3 months  Very severe COPD/emphysema not in exacerbation( Fev1 <1L)  Severe aortic stenosis -ongoing evaluation for valve replacement  Mild mitral regurgitation  Mild to mod pulmonary HTN (mPAP -34) - WHO group 2 + 3   Active heavy tobacco abuse  Undiagnosed MAURISIO  PAD s/p stent  Morbid obesity    PLAN:  CT angiogram done this morning reviewed personally.  Showed evidence of left lower lobe atelectasis/pneumonia with some bronchitis changes which likely explains his new hypoxemia.  No evidence of pulmonary embolism noted.  Evidence of emphysema and cardiac changes along with nonspecific lymphadenopathy.  This will need outpatient follow-up in 3 months.   We will get a procalcitonin and consider antibiotics if elevated but otherwise will need aggressive pulmonary toilet.  Small left pleural effusion.  No indication of thoracentesis.  Continue bronchodilators and discharged on the same.  Do not think inhalers work for him anymore.  Started noninvasive ventilator overnight.  Continue to see if he can tolerate.  Might have to be discharged on the same.  Might have to go through insurance.  Awaiting cardiology/cardiac surgery opinion.  Risk assessment per yesterday's note    I have discussed my findings and recommendations with patient, family and nursing staff.     Manan Rodriguez MD  3/6/2020

## 2020-03-06 NOTE — PROGRESS NOTES
"Daily progress note    Chief complaint  Doing same  Still short of breath with exertion  No chest pain or palpitation  Also denies any cough congestion or nausea    History of present illness  69-year-old white male with history of diabetes hypertension hyperlipidemia valvular heart disease and COPD who continues to smoke and on home oxygen secondary to chronic hypoxic aspiratory failure admitted to cardiology service for severe aortic stenosis for surgical evaluation.  Patient evaluated by cardiothoracic surgery and recommend TAVR and I am asked to follow the patient for medical problem.  At the time of interview he denies any chest pain but he gets short of breath with minimal exertion he has chronic cough occasional nausea but no vomiting diarrhea.  Patient also denies any fever chills night sweats weight loss.    Review of Systems:   GI: Denies abdominal pain and nausea, vomiting, diarrhea  Cardiac: Positive for chest pain on exertion; denies palpitations  Pulmonary: Positive for CAMPOVERDE; denies cough     Physical Exam:  Blood pressure 141/86, pulse 84, temperature 97.8 °F (36.6 °C), temperature source Oral, resp. rate 20, height 175.3 cm (69\"), weight 108 kg (238 lb 8 oz), SpO2 92 %.    General:  Appears in no acute distress; resting comfortably in chair with family at bedside  Eyes: PERTL,  HEENT:  No JVD. Thyroid not visibly enlarged. No mucosal pallor or cyanosis  Respiratory: Respirations regular and unlabored at rest. BBS with good air entry in all fields. No crackles, rubs or wheezes auscultated  Cardiovascular: S1S2 Regular rate and rhythm. No  rub or gallop; systolic murmur RSB. DP/PT pulses   2+. 2-3+ pretibial pitting edema  Gastrointestinal: Abdomen soft, round, non tender. Bowel sounds present.  No ascites  Musculoskeletal: ARTEAGA x4. No abnormal movements  Extremities: No digital clubbing or cyanosis  Skin: Color pink. Skin warm and dry to touch. No rashes    Neuro: AAO x3 CN II-XII grossly intact  Psych: " Mood and affect normal, pleasant and cooperative      LABS  Lab Results (last 24 hours)     Procedure Component Value Units Date/Time    POC Glucose Once [[id number]]  (Abnormal) Collected:  03/06/20 1133    Specimen:  Blood Updated:  03/06/20 1145     Glucose 146 mg/dL     POC Glucose Once [[id number]]  (Abnormal) Collected:  03/06/20 0558    Specimen:  Blood Updated:  03/06/20 0615     Glucose 148 mg/dL     BNP [[id number]]  (Normal) Collected:  03/06/20 0438    Specimen:  Blood Updated:  03/06/20 0552     proBNP 535.0 pg/mL     Narrative:       Among patients with dyspnea, NT-proBNP is highly sensitive for the detection of acute congestive heart failure. In addition NT-proBNP of <300 pg/ml effectively rules out acute congestive heart failure with 99% negative predictive value.    Results may be falsely decreased if patient taking Biotin.      TSH [[id number]]  (Normal) Collected:  03/06/20 0438    Specimen:  Blood Updated:  03/06/20 0552     TSH 1.820 uIU/mL     Comprehensive Metabolic Panel [[id number]]  (Abnormal) Collected:  03/06/20 0438    Specimen:  Blood Updated:  03/06/20 0542     Glucose 163 mg/dL      BUN 13 mg/dL      Creatinine 0.83 mg/dL      Sodium 136 mmol/L      Potassium 4.0 mmol/L      Chloride 90 mmol/L      CO2 34.5 mmol/L      Calcium 10.2 mg/dL      Total Protein 6.9 g/dL      Albumin 3.50 g/dL      ALT (SGPT) 17 U/L      AST (SGOT) 19 U/L      Alkaline Phosphatase 72 U/L      Total Bilirubin 0.3 mg/dL      eGFR Non African Amer 92 mL/min/1.73      Globulin 3.4 gm/dL      A/G Ratio 1.0 g/dL      BUN/Creatinine Ratio 15.7     Anion Gap 11.5 mmol/L     Narrative:       GFR Normal >60  Chronic Kidney Disease <60  Kidney Failure <15      Lipid Panel [[id number]]  (Abnormal) Collected:  03/06/20 0438    Specimen:  Blood Updated:  03/06/20 0542     Total Cholesterol 220 mg/dL      Triglycerides 148 mg/dL      HDL Cholesterol 35 mg/dL      LDL Cholesterol  155 mg/dL      VLDL Cholesterol 29.6 mg/dL       LDL/HDL Ratio 4.44    Narrative:       Cholesterol Reference Ranges  (U.S. Department of Health and Human Services ATP III Classifications)    Desirable          <200 mg/dL  Borderline High    200-239 mg/dL  High Risk          >240 mg/dL      Triglyceride Reference Ranges  (U.S. Department of Health and Human Services ATP III Classifications)    Normal           <150 mg/dL  Borderline High  150-199 mg/dL  High             200-499 mg/dL  Very High        >500 mg/dL    HDL Reference Ranges  (U.S. Department of Health and Human Services ATP III Classifcations)    Low     <40 mg/dl (major risk factor for CHD)  High    >60 mg/dl ('negative' risk factor for CHD)        LDL Reference Ranges  (U.S. Department of Health and Human Services ATP III Classifcations)    Optimal          <100 mg/dL  Near Optimal     100-129 mg/dL  Borderline High  130-159 mg/dL  High             160-189 mg/dL  Very High        >189 mg/dL    Magnesium [[id number]]  (Normal) Collected:  03/06/20 0438    Specimen:  Blood Updated:  03/06/20 0542     Magnesium 1.6 mg/dL     CBC & Differential [[id number]] Collected:  03/06/20 0438    Specimen:  Blood Updated:  03/06/20 0515    Narrative:       The following orders were created for panel order CBC & Differential.  Procedure                               Abnormality         Status                     ---------                               -----------         ------                     CBC Auto Differential[[id number]]        Abnormal            Final result                 Please view results for these tests on the individual orders.    CBC Auto Differential [[id number]]  (Abnormal) Collected:  03/06/20 0438    Specimen:  Blood Updated:  03/06/20 0515     WBC 6.84 10*3/mm3      RBC 4.95 10*6/mm3      Hemoglobin 14.2 g/dL      Hematocrit 42.5 %      MCV 85.9 fL      MCH 28.7 pg      MCHC 33.4 g/dL      RDW 12.7 %      RDW-SD 39.5 fl      MPV 9.0 fL      Platelets 210 10*3/mm3      Neutrophil % 62.7 %       Lymphocyte % 18.1 %      Monocyte % 11.4 %      Eosinophil % 6.7 %      Basophil % 0.7 %      Immature Grans % 0.4 %      Neutrophils, Absolute 4.28 10*3/mm3      Lymphocytes, Absolute 1.24 10*3/mm3      Monocytes, Absolute 0.78 10*3/mm3      Eosinophils, Absolute 0.46 10*3/mm3      Basophils, Absolute 0.05 10*3/mm3      Immature Grans, Absolute 0.03 10*3/mm3      nRBC 0.0 /100 WBC     POC Glucose Once [[id number]]  (Abnormal) Collected:  03/05/20 1908    Specimen:  Blood Updated:  03/05/20 1909     Glucose 141 mg/dL     D-dimer, Quantitative [[id number]]  (Abnormal) Collected:  03/05/20 1747    Specimen:  Blood from Arm, Right Updated:  03/05/20 1825     D-Dimer, Quantitative 2.34 MCGFEU/mL     Narrative:       The Stago D-Dimer test used in conjunction with a clinical pretest probability (PTP) assessment model, has been approved by the FDA to rule out the presence of venous thromboembolism (VTE) in outpatients suspected of deep venous thrombosis (DVT) or pulmonary embolism (PE). The cut-off for negative predictive value is <0.50 MCGFEU/mL.    POC Glucose Once [[id number]]  (Normal) Collected:  03/05/20 1606    Specimen:  Blood Updated:  03/05/20 1608     Glucose 128 mg/dL         Imaging Results (Last 24 Hours)     Procedure Component Value Units Date/Time    CT Angiogram Chest With & Without Contrast [[id number]] Resulted:  03/06/20 1012     Updated:  03/06/20 1016      ECG 12 Lead        HEART RATE= 95  bpm  RR Interval= 668  ms  TX Interval= 176  ms  P Horizontal Axis= 24  deg  P Front Axis= 52  deg  QRSD Interval= 87  ms  QT Interval= 345  ms  QRS Axis= 51  deg  T Wave Axis= 58  deg  - OTHERWISE NORMAL ECG -  Sinus rhythm  Ventricular premature complex             Current Facility-Administered Medications:   •  acetaminophen (TYLENOL) tablet 650 mg, 650 mg, Oral, Q6H PRN, January Stout APRN, 650 mg at 03/05/20 0931  •  amLODIPine (NORVASC) tablet 5 mg, 5 mg, Oral, Daily, Paulino Mariee MD, 5 mg at  03/06/20 0943  •  aspirin chewable tablet 81 mg, 81 mg, Oral, Daily, Ashley Leger APRN, 81 mg at 03/06/20 0943  •  atorvastatin (LIPITOR) tablet 40 mg, 40 mg, Oral, Nightly, Neri Ceron MD, 40 mg at 03/05/20 2003  •  budesonide (PULMICORT) nebulizer solution 0.5 mg, 0.5 mg, Nebulization, BID - RT, Manan Rodriguez MD, 0.5 mg at 03/06/20 1135  •  chlordiazePOXIDE (LIBRIUM) capsule 5 mg, 5 mg, Oral, 4x Daily PRN, Jr Philip Richards MD  •  clopidogrel (PLAVIX) tablet 75 mg, 75 mg, Oral, Daily, January Stout APRN, 75 mg at 03/06/20 0942  •  folic acid (FOLVITE) tablet 1 mg, 1 mg, Oral, Daily, January Stout APRN, 1 mg at 03/06/20 0943  •  furosemide (LASIX) tablet 40 mg, 40 mg, Oral, Daily, Paulino Mariee MD, 40 mg at 03/06/20 0943  •  insulin lispro (humaLOG) injection 0-7 Units, 0-7 Units, Subcutaneous, 4x Daily With Meals & Nightly, Neri Ceron MD, Stopped at 03/05/20 2152  •  ipratropium-albuterol (DUO-NEB) nebulizer solution 3 mL, 3 mL, Nebulization, Q4H - RT, Neri Ceron MD, 3 mL at 03/06/20 1134  •  magnesium sulfate 4 gram infusion - Mg less than or equal to 1mg/dL, 4 g, Intravenous, PRN **OR** magnesium sulfate 3 gram infusion (1gm x 3) - Mg 1.1 - 1.5 mg/dL, 1 g, Intravenous, PRN **OR** Magnesium Sulfate 2 gram infusion- Mg 1.6 - 1.9 mg/dL, 2 g, Intravenous, PRN, Justine Keller, APRN  •  metoprolol tartrate (LOPRESSOR) tablet 25 mg, 25 mg, Oral, Q12H, Ashley Leger APRN, 25 mg at 03/06/20 0942  •  multivitamin (THERAGRAN) tablet 1 tablet, 1 tablet, Oral, Daily, January Stout APRN, 1 tablet at 03/06/20 0943  •  nicotine (NICODERM CQ) 7 MG/24HR patch 1 patch, 1 patch, Transdermal, Q24H, Paulino Mariee MD, 1 patch at 03/06/20 0943  •  pantoprazole (PROTONIX) EC tablet 40 mg, 40 mg, Oral, Q AM, Neri Ceron MD, 40 mg at 03/06/20 0605  •  potassium chloride (MICRO-K) CR capsule 40 mEq, 40 mEq, Oral, PRN **OR** potassium chloride (KLOR-CON)  packet 40 mEq, 40 mEq, Oral, PRN **OR** potassium chloride 10 mEq in 100 mL IVPB, 10 mEq, Intravenous, Q1H PRN, Justine Keller APRN  •  rOPINIRole (REQUIP) tablet 4 mg, 4 mg, Oral, Nightly, January Stout APRN, 4 mg at 03/05/20 2003  •  thiamine (VITAMIN B-1) tablet 100 mg, 100 mg, Oral, Daily, January Stout APRN, 100 mg at 03/06/20 0943     ASSESSMENT  Severe aortic stenosis  Chronic hypoxic aspiratory failure  COPD/tobacco abuse  Morbidly obese  Obstructive sleep apnea  Diabetes mellitus  Hypertension  Hyperlipidemia  Peripheral artery disease status post popliteal stent  Gastroesophageal reflux disease    PLAN  CPM  Supplement oxygen nebulizer  CT chest today  Accu-Chek with sliding scale insulin  Adjust home medications  TAVR per cardiology  Stress ulcer DVT prophylaxis  Supportive care  We will follow with Dr. SOWMYA FERNANDEZ MD

## 2020-03-06 NOTE — PROGRESS NOTES
[facility]  Cardiology Progress Note    Patient Identification:  Name: Fili Ladd  Age: 69 y.o.  Sex: male  :  1950  MRN: [id number]                 Follow Up / Chief Complaint: Follow-up severe aortic stenosis    Interval History: Plan is for patient to undergo TAVR.  Now with possible pneumonia.       Subjective: Remains with CAMPOVERDE, denies CP      Objective:    Past Medical History:  Past Medical History:   Diagnosis Date   • COPD (chronic obstructive pulmonary disease) (CMS/[facility])    • Diabetes mellitus (CMS/[facility])    • Heart murmur    • Hyperlipidemia    • Hypertension    • Valvular disease      Past Surgical History:  Past Surgical History:   Procedure Laterality Date   • CARDIAC CATHETERIZATION N/A 3/4/2020    Procedure: right and Left Heart Cath,;  Surgeon: Paulino Mariee MD;  Location:  [facility];  Service: Cardiology;  Laterality: N/A;   • CARDIAC CATHETERIZATION N/A 3/4/2020    Procedure: Coronary angiography;  Surgeon: Paulino Mariee MD;  Location:  [facility];  Service: Cardiology;  Laterality: N/A;   • CARDIAC CATHETERIZATION N/A 3/4/2020    Procedure: Left ventriculography;  Surgeon: Paulino Mariee MD;  Location:  [facility];  Service: Cardiology;  Laterality: N/A;   • HERNIA REPAIR     • HERNIA REPAIR     • ILIAC ARTERY STENT  2019   • POPLITEAL ARTERY STENT Left 2019   • POPLITEAL ARTERY STENT Right 2019        Social History:   Social History     Tobacco Use   • Smoking status: Current Every Day Smoker     Packs/day: 2.00   • Smokeless tobacco: Never Used   Substance Use Topics   • Alcohol use: Yes     Comment: DRINKS 4-5 BEERS DAILY      Family History:  Family History   Problem Relation Age of Onset   • Arrhythmia Mother    • Heart attack Maternal Grandfather    • Heart attack Paternal Grandfather    • Hypertension Neg Hx    • Hyperlipidemia Neg Hx    • Heart failure Neg Hx   "  • Heart disease Neg Hx           Allergies:  Allergies   Allergen Reactions   • Penicillins    • Sulfa Antibiotics      Scheduled Meds:    amLODIPine 5 mg Daily   aspirin 81 mg Daily   atorvastatin 80 mg Nightly   budesonide 0.5 mg BID - RT   clopidogrel 75 mg Daily   folic acid 1 mg Daily   furosemide 40 mg Daily   insulin lispro 0-7 Units 4x Daily With Meals & Nightly   ipratropium-albuterol 3 mL Q4H - RT   metoprolol tartrate 25 mg Q12H   multivitamin 1 tablet Daily   nicotine 1 patch Q24H   pantoprazole 40 mg Q AM   rOPINIRole 4 mg Nightly   sodium chloride 4 mL BID - RT   thiamine 100 mg Daily     I have reviewed the patient's recent medical history and current medications, as well as personally reviewed/interpreted the ECG/telemetry data    INTAKE AND OUTPUT:    Intake/Output Summary (Last 24 hours) at 3/6/2020 1513  Last data filed at 3/5/2020 2100  Gross per 24 hour   Intake 100 ml   Output 200 ml   Net -100 ml       ROS  Constitutional: Awake and alert, no fever. No nosebleeds  Abdomen           no abdominal pain   Cardiac              no chest pain  Pulmonary          no shortness of breath      /60 (BP Location: Right arm, Patient Position: Sitting)   Pulse 75   Temp 97.5 °F (36.4 °C) (Oral)   Resp 16   Ht 175.3 cm (69\")   Wt 108 kg (238 lb 8 oz)   SpO2 95%   BMI 35.22 kg/m²   General appearance: No acute changes   Neck: Trachea midline; NECK, supple, no thyromegaly or lymphadenopathy   Lungs: Normal size and shape, normal breath sounds, equal distribution of air, no rales and rhonchi   CV: S1-S2 regular, no murmurs, no rub, no gallop   Abdomen: Soft, non-tender; no masses , no abnormal abdominal sounds   Extremities: No deformity , normal color , no peripheral edema   Skin: Normal temperature, turgor and texture; no rash, ulcers          Cardiographics  Telemetry:   3/6/20 Sinus tach rate low 100s      ECG:   3/5/20 SR with PVCs rate 90s        Echocardiogram:   2/19/20  Interpretation " Summary     · There is calcification of the aortic valve.  · Severe aortic valve stenosis is present.  · Mild mitral valve regurgitation is present  · Left atrial cavity size is mildly dilated.  · Calculated EF = 64%  · There is no evidence of pericardial effusion.          Cardiac catheterization  3/4/20  HEMODYNAMIC / ANGIOGRAPHIC DATA:    1. Pulmonary capillary wedge pressure was 13.   2. Pulmonary artery systolic pressure was 53/31/34.  3. Right atrial pressure was 12.  4. Cardiac index was 2.53.  5. Left ventricular end diastolic pressure was 12 mmHg.  6. Left ventriculography revealed the EF to be 60%.  7. The left main is , normal left main  8. The LAD is .Left anterior descending shows midportion 50% stenosis with early atherosclerotic plaque of the diagonal and  branches  9. Circumflex artery ostial 60 to 70% stenosis with normal distal flow  10. Right coronary artery is a dominant with early atherosclerotic plaque  11. Severe aortic gradient of 50 mmHg with a severe aortic stenosis with a valve area of 0.75  12. Mild pulmonary hypertension with a PA pressures around 49 to 50 m of mercury     RECOMMENDATIONS:  Post-procedure care will focus on prevention of any ischemic events and congestive complications.      Lab Review       Results from last 7 days   Lab Units 03/06/20  0438   MAGNESIUM mg/dL 1.6     Results from last 7 days   Lab Units 03/06/20  0438   SODIUM mmol/L 136   POTASSIUM mmol/L 4.0   BUN mg/dL 13   CREATININE mg/dL 0.83   CALCIUM mg/dL 10.2       Results from last 7 days   Lab Units 03/06/20  0438 03/05/20  0318 03/04/20  1223   WBC 10*3/mm3 6.84 6.58  --    HEMOGLOBIN g/dL 14.2 14.0  --    HEMOGLOBIN, POC g/dL  --   --  15.3   HEMATOCRIT % 42.5 42.6  --    HEMATOCRIT POC %  --   --  45   PLATELETS 10*3/mm3 210 202  --      Results from last 7 days   Lab Units 03/05/20  0318   INR  1.02   APTT seconds 33.8     Estimated Creatinine Clearance: 103.1 mL/min (by C-G formula based on  "SCr of 0.83 mg/dL).    I have reviewed the medical decision making with Dr. Mariee in detail.     Assessment:  1.  Severe aortic stenosis  2.  CAD  3.  COPD  4.  Hypertension  5.  Hyperlipidemia  6.  DM2  7.  Hypomagnesemia    Plan:  Patient now undergoing work-up for TAVR.  D-dimer elevated, CTA of chest performed per pulmonology.  Negative for PE, but concerning for possible pneumonia.  Procalcitonin level pending.  Plan is for possible antibiotic therapy to be started.  Blood pressure currently stable and controlled.  Magnesium level is a bit low today, will replace.  Remains in sinus rhythm on telemetry.  Continue current medications.  We will plan to discharge tomorrow if okay with all providers.  Appreciate pulmonology attention to patient.      Labs/tests ordered:  Cbc, bmp, mag, mag sulf      )3/6/2020  PHONG Palma       Patient personally interviewed and above subjective findings personally confirmed during a face to face contact with patient today  All findings of physical examination confirmed  All pertinent and performed labs, cardiac procedures ,  radiographs of the last 24 hours personally reviewed  Impression and plans discussed/elaborated and implemented jointly as described above     Paulino Mariee MD            EMR Dragon/Transcription:   \"Dictated utilizing Dragon dictation\".   "

## 2020-03-07 VITALS
TEMPERATURE: 98.3 F | BODY MASS INDEX: 35.32 KG/M2 | WEIGHT: 238.5 LBS | RESPIRATION RATE: 18 BRPM | DIASTOLIC BLOOD PRESSURE: 80 MMHG | OXYGEN SATURATION: 91 % | SYSTOLIC BLOOD PRESSURE: 136 MMHG | HEART RATE: 83 BPM | HEIGHT: 69 IN

## 2020-03-07 LAB
ABO + RH BLD: NORMAL
ABO + RH BLD: NORMAL
ANION GAP SERPL CALCULATED.3IONS-SCNC: 10.3 MMOL/L (ref 5–15)
BASOPHILS # BLD AUTO: 0.04 10*3/MM3 (ref 0–0.2)
BASOPHILS NFR BLD AUTO: 0.6 % (ref 0–1.5)
BH BB BLOOD EXPIRATION DATE: NORMAL
BH BB BLOOD EXPIRATION DATE: NORMAL
BH BB BLOOD TYPE BARCODE: 5100
BH BB BLOOD TYPE BARCODE: 5100
BH BB DISPENSE STATUS: NORMAL
BH BB DISPENSE STATUS: NORMAL
BH BB PRODUCT CODE: NORMAL
BH BB PRODUCT CODE: NORMAL
BH BB UNIT NUMBER: NORMAL
BH BB UNIT NUMBER: NORMAL
BUN BLD-MCNC: 13 MG/DL (ref 8–23)
BUN/CREAT SERPL: 14.1 (ref 7–25)
CALCIUM SPEC-SCNC: 9.7 MG/DL (ref 8.6–10.5)
CHLORIDE SERPL-SCNC: 89 MMOL/L (ref 98–107)
CO2 SERPL-SCNC: 34.7 MMOL/L (ref 22–29)
CREAT BLD-MCNC: 0.92 MG/DL (ref 0.76–1.27)
CROSSMATCH INTERPRETATION: NORMAL
CROSSMATCH INTERPRETATION: NORMAL
DEPRECATED RDW RBC AUTO: 40.9 FL (ref 37–54)
EOSINOPHIL # BLD AUTO: 0.42 10*3/MM3 (ref 0–0.4)
EOSINOPHIL NFR BLD AUTO: 6.3 % (ref 0.3–6.2)
ERYTHROCYTE [DISTWIDTH] IN BLOOD BY AUTOMATED COUNT: 12.9 % (ref 12.3–15.4)
GFR SERPL CREATININE-BSD FRML MDRD: 82 ML/MIN/1.73
GLUCOSE BLD-MCNC: 153 MG/DL (ref 65–99)
GLUCOSE BLDC GLUCOMTR-MCNC: 142 MG/DL (ref 70–130)
GLUCOSE BLDC GLUCOMTR-MCNC: 163 MG/DL (ref 70–130)
GLUCOSE BLDC GLUCOMTR-MCNC: 193 MG/DL (ref 70–130)
HCT VFR BLD AUTO: 41.5 % (ref 37.5–51)
HGB BLD-MCNC: 13.9 G/DL (ref 13–17.7)
IMM GRANULOCYTES # BLD AUTO: 0.03 10*3/MM3 (ref 0–0.05)
IMM GRANULOCYTES NFR BLD AUTO: 0.4 % (ref 0–0.5)
LYMPHOCYTES # BLD AUTO: 1.14 10*3/MM3 (ref 0.7–3.1)
LYMPHOCYTES NFR BLD AUTO: 17 % (ref 19.6–45.3)
MAGNESIUM SERPL-MCNC: 1.8 MG/DL (ref 1.6–2.4)
MCH RBC QN AUTO: 29 PG (ref 26.6–33)
MCHC RBC AUTO-ENTMCNC: 33.5 G/DL (ref 31.5–35.7)
MCV RBC AUTO: 86.6 FL (ref 79–97)
MONOCYTES # BLD AUTO: 0.72 10*3/MM3 (ref 0.1–0.9)
MONOCYTES NFR BLD AUTO: 10.8 % (ref 5–12)
NEUTROPHILS # BLD AUTO: 4.34 10*3/MM3 (ref 1.7–7)
NEUTROPHILS NFR BLD AUTO: 64.9 % (ref 42.7–76)
NRBC BLD AUTO-RTO: 0 /100 WBC (ref 0–0.2)
PLATELET # BLD AUTO: 205 10*3/MM3 (ref 140–450)
PMV BLD AUTO: 9.2 FL (ref 6–12)
POTASSIUM BLD-SCNC: 4 MMOL/L (ref 3.5–5.2)
RBC # BLD AUTO: 4.79 10*6/MM3 (ref 4.14–5.8)
SODIUM BLD-SCNC: 134 MMOL/L (ref 136–145)
UNIT  ABO: NORMAL
UNIT  ABO: NORMAL
UNIT  RH: NORMAL
UNIT  RH: NORMAL
WBC NRBC COR # BLD: 6.69 10*3/MM3 (ref 3.4–10.8)

## 2020-03-07 PROCEDURE — 99238 HOSP IP/OBS DSCHRG MGMT 30/<: CPT | Performed by: NURSE PRACTITIONER

## 2020-03-07 PROCEDURE — 83735 ASSAY OF MAGNESIUM: CPT | Performed by: NURSE PRACTITIONER

## 2020-03-07 PROCEDURE — 85025 COMPLETE CBC W/AUTO DIFF WBC: CPT | Performed by: NURSE PRACTITIONER

## 2020-03-07 PROCEDURE — 94799 UNLISTED PULMONARY SVC/PX: CPT

## 2020-03-07 PROCEDURE — 82962 GLUCOSE BLOOD TEST: CPT

## 2020-03-07 PROCEDURE — 80048 BASIC METABOLIC PNL TOTAL CA: CPT | Performed by: NURSE PRACTITIONER

## 2020-03-07 PROCEDURE — 63710000001 INSULIN LISPRO (HUMAN) PER 5 UNITS: Performed by: HOSPITALIST

## 2020-03-07 PROCEDURE — 99231 SBSQ HOSP IP/OBS SF/LOW 25: CPT | Performed by: NURSE PRACTITIONER

## 2020-03-07 RX ORDER — ATORVASTATIN CALCIUM 80 MG/1
80 TABLET, FILM COATED ORAL NIGHTLY
Qty: 30 TABLET | Refills: 11 | Status: SHIPPED | OUTPATIENT
Start: 2020-03-07

## 2020-03-07 RX ADMIN — BUDESONIDE 0.5 MG: 0.5 INHALANT RESPIRATORY (INHALATION) at 07:53

## 2020-03-07 RX ADMIN — AMLODIPINE BESYLATE 5 MG: 5 TABLET ORAL at 08:39

## 2020-03-07 RX ADMIN — SODIUM CHLORIDE 4 ML: 7 NEBU SOLN,3 % NEBU at 07:53

## 2020-03-07 RX ADMIN — IPRATROPIUM BROMIDE AND ALBUTEROL SULFATE 3 ML: 2.5; .5 SOLUTION RESPIRATORY (INHALATION) at 07:53

## 2020-03-07 RX ADMIN — FOLIC ACID 1 MG: 1 TABLET ORAL at 08:39

## 2020-03-07 RX ADMIN — CLOPIDOGREL 75 MG: 75 TABLET, FILM COATED ORAL at 08:39

## 2020-03-07 RX ADMIN — Medication 100 MG: at 08:39

## 2020-03-07 RX ADMIN — IPRATROPIUM BROMIDE AND ALBUTEROL SULFATE 3 ML: 2.5; .5 SOLUTION RESPIRATORY (INHALATION) at 03:49

## 2020-03-07 RX ADMIN — NICOTINE 1 PATCH: 7 PATCH, EXTENDED RELEASE TRANSDERMAL at 11:44

## 2020-03-07 RX ADMIN — IPRATROPIUM BROMIDE AND ALBUTEROL SULFATE 3 ML: 2.5; .5 SOLUTION RESPIRATORY (INHALATION) at 00:24

## 2020-03-07 RX ADMIN — FUROSEMIDE 40 MG: 40 TABLET ORAL at 08:39

## 2020-03-07 RX ADMIN — ASPIRIN 81 MG: 81 TABLET, CHEWABLE ORAL at 08:39

## 2020-03-07 RX ADMIN — Medication 1 TABLET: at 08:39

## 2020-03-07 RX ADMIN — PANTOPRAZOLE SODIUM 40 MG: 40 TABLET, DELAYED RELEASE ORAL at 06:38

## 2020-03-07 RX ADMIN — METOPROLOL TARTRATE 25 MG: 25 TABLET ORAL at 08:39

## 2020-03-07 RX ADMIN — INSULIN LISPRO 2 UNITS: 100 INJECTION, SOLUTION INTRAVENOUS; SUBCUTANEOUS at 11:45

## 2020-03-07 RX ADMIN — IPRATROPIUM BROMIDE AND ALBUTEROL SULFATE 3 ML: 2.5; .5 SOLUTION RESPIRATORY (INHALATION) at 11:43

## 2020-03-07 NOTE — PROGRESS NOTES
"Daily progress note    Chief complaint  Doing same  Still short of breath   No chest pain or palpitation    History of present illness  69-year-old white male with history of diabetes hypertension hyperlipidemia valvular heart disease and COPD who continues to smoke and on home oxygen secondary to chronic hypoxic aspiratory failure admitted to cardiology service for severe aortic stenosis for surgical evaluation.  Patient evaluated by cardiothoracic surgery and recommend TAVR and I am asked to follow the patient for medical problem.  At the time of interview he denies any chest pain but he gets short of breath with minimal exertion he has chronic cough occasional nausea but no vomiting diarrhea.  Patient also denies any fever chills night sweats weight loss.    Review of Systems:   Remarkable for shortness of breath     Physical Exam:  Blood pressure 136/80, pulse 83, temperature 98.3 °F (36.8 °C), temperature source Oral, resp. rate 18, height 175.3 cm (69\"), weight 108 kg (238 lb 8 oz), SpO2 91 %.    General:  Appears in no acute distress; resting comfortably in chair with family at bedside  Eyes: PERTL,  HEENT:  No JVD. Thyroid not visibly enlarged. No mucosal pallor or cyanosis  Respiratory: Respirations regular and unlabored at rest. BBS with good air entry in all fields. No crackles, rubs or wheezes auscultated  Cardiovascular: S1S2 Regular rate and rhythm. No  rub or gallop; systolic murmur RSB. DP/PT pulses   2+. 2-3+ pretibial pitting edema  Gastrointestinal: Abdomen soft, round, non tender. Bowel sounds present.  No ascites  Musculoskeletal: ARTEAGA x4. No abnormal movements  Extremities: No digital clubbing or cyanosis  Skin: Color pink. Skin warm and dry to touch. No rashes    Neuro: AAO x3 CN II-XII grossly intact  Psych: Mood and affect normal, pleasant and cooperative      LABS  Lab Results (last 24 hours)     Procedure Component Value Units Date/Time    POC Glucose Once [[id number]]  (Abnormal) Collected: "  03/07/20 1034    Specimen:  Blood Updated:  03/07/20 1035     Glucose 193 mg/dL     POC Glucose Once [[id number]]  (Abnormal) Collected:  03/07/20 0558    Specimen:  Blood Updated:  03/07/20 0559     Glucose 142 mg/dL     Basic Metabolic Panel [[id number]]  (Abnormal) Collected:  03/07/20 0308    Specimen:  Blood Updated:  03/07/20 0410     Glucose 153 mg/dL      BUN 13 mg/dL      Creatinine 0.92 mg/dL      Sodium 134 mmol/L      Potassium 4.0 mmol/L      Chloride 89 mmol/L      CO2 34.7 mmol/L      Calcium 9.7 mg/dL      eGFR Non African Amer 82 mL/min/1.73      BUN/Creatinine Ratio 14.1     Anion Gap 10.3 mmol/L     Narrative:       GFR Normal >60  Chronic Kidney Disease <60  Kidney Failure <15      Magnesium [[id number]]  (Normal) Collected:  03/07/20 0308    Specimen:  Blood Updated:  03/07/20 0410     Magnesium 1.8 mg/dL     CBC & Differential [[id number]] Collected:  03/07/20 0308    Specimen:  Blood Updated:  03/07/20 0346    Narrative:       The following orders were created for panel order CBC & Differential.  Procedure                               Abnormality         Status                     ---------                               -----------         ------                     CBC Auto Differential[[id number]]        Abnormal            Final result                 Please view results for these tests on the individual orders.    CBC Auto Differential [[id number]]  (Abnormal) Collected:  03/07/20 0308    Specimen:  Blood Updated:  03/07/20 0346     WBC 6.69 10*3/mm3      RBC 4.79 10*6/mm3      Hemoglobin 13.9 g/dL      Hematocrit 41.5 %      MCV 86.6 fL      MCH 29.0 pg      MCHC 33.5 g/dL      RDW 12.9 %      RDW-SD 40.9 fl      MPV 9.2 fL      Platelets 205 10*3/mm3      Neutrophil % 64.9 %      Lymphocyte % 17.0 %      Monocyte % 10.8 %      Eosinophil % 6.3 %      Basophil % 0.6 %      Immature Grans % 0.4 %      Neutrophils, Absolute 4.34 10*3/mm3      Lymphocytes, Absolute 1.14 10*3/mm3      Monocytes,  "Absolute 0.72 10*3/mm3      Eosinophils, Absolute 0.42 10*3/mm3      Basophils, Absolute 0.04 10*3/mm3      Immature Grans, Absolute 0.03 10*3/mm3      nRBC 0.0 /100 WBC     POC Glucose Once [[id number]]  (Abnormal) Collected:  03/06/20 2009    Specimen:  Blood Updated:  03/06/20 2011     Glucose 161 mg/dL     POC Glucose Once [[id number]]  (Abnormal) Collected:  03/06/20 1526    Specimen:  Blood Updated:  03/06/20 1527     Glucose 184 mg/dL     Procalcitonin [[id number]]  (Abnormal) Collected:  03/06/20 0438    Specimen:  Blood Updated:  03/06/20 1514     Procalcitonin 0.05 ng/mL     Narrative:       As a Marker for Sepsis (Non-Neonates):   1. <0.5 ng/mL represents a low risk of severe sepsis and/or septic shock.  1. >2 ng/mL represents a high risk of severe sepsis and/or septic shock.    As a Marker for Lower Respiratory Tract Infections that require antibiotic therapy:  PCT on Admission     Antibiotic Therapy             6-12 Hrs later  > 0.5                Strongly Recommended            >0.25 - <0.5         Recommended  0.1 - 0.25           Discouraged                   Remeasure/reassess PCT  <0.1                 Strongly Discouraged          Remeasure/reassess PCT      As 28 day mortality risk marker: \"Change in Procalcitonin Result\" (> 80 % or <=80 %) if Day 0 (or Day 1) and Day 4 values are available. Refer to http://www.[employer]-pct-calculator.com/   Change in PCT <=80 %   A decrease of PCT levels below or equal to 80 % defines a positive change in PCT test result representing a higher risk for 28-day all-cause mortality of patients diagnosed with severe sepsis or septic shock.  Change in PCT > 80 %   A decrease of PCT levels of more than 80 % defines a negative change in PCT result representing a lower risk for 28-day all-cause mortality of patients diagnosed with severe sepsis or septic shock.                Results may be falsely decreased if patient taking Biotin.         Imaging Results (Last 24 Hours)     " Procedure Component Value Units Date/Time    CT Angiogram Chest With & Without Contrast [[id number]] Collected:  03/06/20 1252     Updated:  03/06/20 1337    Narrative:       CT ANGIOGRAM OF THE CHEST. MULTIPLE CORONAL, SAGITTAL, AND 3-D  RECONSTRUCTIONS.     HISTORY: Shortness of breath, chest pain positive d-dimer     TECHNIQUE: Radiation dose reduction techniques were utilized, including  automated exposure control and exposure modulation based on body size.   CT angiogram of the chest was performed. Multiple coronal, sagittal, and  3-D reconstruction images were obtained.      COMPARISON:None     FINDINGS:      Cystic density lesion within the left kidney likely represents simple  cyst.     There is no hilar or axillary adenopathy by size criteria. Mediastinal  adenopathy is present with index 1.3 cm subcarinal and 1.2 cm right  paratracheal nodes.     The heart is normal in size. Moderate to extensive coronary artery  calcification is present. There is severe calcification of the aortic  valve.     There are no findings of pulmonary emboli. Irregularity of very tiny  pulmonary arteries within the left base is favored to be secondary to  surrounding pulmonary opacification. The RV: LV ratio is not elevated at  0.9.     Intralobular septal thickening is present within the right base. There  is moderate emphysema. There is a small left pleural effusion.  Asymmetric pulmonary opacification is present within the left lower  lobe. Sub-6 mm pulmonary nodules present within the right lower lobe.  Findings of endobronchial wall thickening are present bilaterally with  scattered endobronchial filling defects.     No suspicious lytic or blastic osseous lesions coronal sagittal       Impression:       1.  No findings of pulmonary embolus.  2.  Asymmetric pulmonary opacification within the left base with  findings of endobronchial wall thickening and scattered endobronchial  filling defects bilaterally most concerning for  bronchitis and pneumonia  in the appropriate clinical context and correlation with patient history  is recommended. Small left pleural effusion is present.  3.  Moderate emphysema.  4.  Mediastinal and hilar adenopathy, favored to be reactive in the  absence of known malignancy. Findings can be followed up with chest CT  in 3 months if clinically indicated.  5.  Other findings as above.     This report was finalized on 3/6/2020 1:34 PM by Dr. Shayne Hull M.D.         ECG 12 Lead        HEART RATE= 95  bpm  RR Interval= 668  ms  OR Interval= 176  ms  P Horizontal Axis= 24  deg  P Front Axis= 52  deg  QRSD Interval= 87  ms  QT Interval= 345  ms  QRS Axis= 51  deg  T Wave Axis= 58  deg  - OTHERWISE NORMAL ECG -  Sinus rhythm  Ventricular premature complex             Current Facility-Administered Medications:   •  acetaminophen (TYLENOL) tablet 650 mg, 650 mg, Oral, Q6H PRN, January Stout APRN, 650 mg at 03/05/20 0931  •  amLODIPine (NORVASC) tablet 5 mg, 5 mg, Oral, Daily, Paulino Mariee MD, 5 mg at 03/07/20 0839  •  aspirin chewable tablet 81 mg, 81 mg, Oral, Daily, Ashley Leger APRN, 81 mg at 03/07/20 0839  •  atorvastatin (LIPITOR) tablet 80 mg, 80 mg, Oral, Nightly, Neri Ceron MD, 80 mg at 03/06/20 2025  •  budesonide (PULMICORT) nebulizer solution 0.5 mg, 0.5 mg, Nebulization, BID - RT, Manan Rodriguez MD, 0.5 mg at 03/07/20 0753  •  chlordiazePOXIDE (LIBRIUM) capsule 5 mg, 5 mg, Oral, 4x Daily PRN, Jr Philip Richards MD  •  clopidogrel (PLAVIX) tablet 75 mg, 75 mg, Oral, Daily, January Stout APRN, 75 mg at 03/07/20 0839  •  folic acid (FOLVITE) tablet 1 mg, 1 mg, Oral, Daily, January Stout APRPEACE, 1 mg at 03/07/20 0839  •  furosemide (LASIX) tablet 40 mg, 40 mg, Oral, Daily, Paulino Mariee MD, 40 mg at 03/07/20 0839  •  insulin lispro (humaLOG) injection 0-7 Units, 0-7 Units, Subcutaneous, 4x Daily With Meals & Nightly, Neri Ceron MD, 2 Units at  03/07/20 1145  •  ipratropium-albuterol (DUO-NEB) nebulizer solution 3 mL, 3 mL, Nebulization, Q4H - RT, Neri Ceron MD, 3 mL at 03/07/20 1143  •  magnesium sulfate 4 gram infusion - Mg less than or equal to 1mg/dL, 4 g, Intravenous, PRN **OR** magnesium sulfate 3 gram infusion (1gm x 3) - Mg 1.1 - 1.5 mg/dL, 1 g, Intravenous, PRN **OR** Magnesium Sulfate 2 gram infusion- Mg 1.6 - 1.9 mg/dL, 2 g, Intravenous, PRN, Justine Keller APRN  •  metoprolol tartrate (LOPRESSOR) tablet 25 mg, 25 mg, Oral, Q12H, Ashley Leger APRN, 25 mg at 03/07/20 0839  •  multivitamin (THERAGRAN) tablet 1 tablet, 1 tablet, Oral, Daily, January Stout APRN, 1 tablet at 03/07/20 0839  •  nicotine (NICODERM CQ) 7 MG/24HR patch 1 patch, 1 patch, Transdermal, Q24H, Paulino Mariee MD, 1 patch at 03/07/20 1144  •  pantoprazole (PROTONIX) EC tablet 40 mg, 40 mg, Oral, Q AM, Neri Ceron MD, 40 mg at 03/07/20 0638  •  potassium chloride (MICRO-K) CR capsule 40 mEq, 40 mEq, Oral, PRN **OR** potassium chloride (KLOR-CON) packet 40 mEq, 40 mEq, Oral, PRN **OR** potassium chloride 10 mEq in 100 mL IVPB, 10 mEq, Intravenous, Q1H PRN, Justine Keller APRN  •  rOPINIRole (REQUIP) tablet 4 mg, 4 mg, Oral, Nightly, January Stout APRN, 4 mg at 03/06/20 2025  •  sodium chloride 7 % nebulizer solution nebulizer solution 4 mL, 4 mL, Nebulization, BID - RT, Manan Rodriguez MD, 4 mL at 03/07/20 0753  •  thiamine (VITAMIN B-1) tablet 100 mg, 100 mg, Oral, Daily, January Stout, APRN, 100 mg at 03/07/20 0839     ASSESSMENT  Severe aortic stenosis  Chronic hypoxic aspiratory failure  COPD/tobacco abuse  Morbidly obese  Obstructive sleep apnea  Diabetes mellitus  Hypertension  Hyperlipidemia  Peripheral artery disease status post popliteal stent  Gastroesophageal reflux disease    PLAN  CPM  Supplement oxygen nebulizer  Accu-Chek with sliding scale insulin  Adjust home medications  Aortic valve replacement per  cardiology  Stress ulcer DVT prophylaxis  Supportive care  We will follow with Dr. SOWMYA FERNANDEZ MD

## 2020-03-07 NOTE — DISCHARGE SUMMARY
[facility]  Physician Discharge Summary    Patient Identification:  Name: Fili aLdd  Age: 69 y.o.  Sex: male  :  1950  MRN: [id number]    Admit date: 3/4/2020    Discharge date and time:  3/7/2020 15:07      Admitting Physician: Paulino Mariee MD     Discharge Provider: PHONG Ryan    Discharge Diagnoses: Severe aortic stenosis, shortness of breath, acute on chronic hypoxic respiratory failure      Patient Active Problem List   Diagnosis   • Aortic valve stenosis   • Chronic obstructive pulmonary disease (CMS/HCC)   • Essential hypertension   • Hyperlipidemia   • SOB (shortness of breath)   • Overweight   • Nonrheumatic aortic valve stenosis       Discharged Condition: stable    Hospital Course: This 69-year-old male was admitted after being seen in the outpatient setting by APRN regarding shortness of breath with rales noted on exam and bilateral lower extremity edema.  Current diagnoses to include aortic stenosis, hypertension, hyperlipidemia, tobacco abuse, COPD.  Echocardiogram performed previously 2020 revealed severe aortic stenosis with DEXTER of 0.89 cm², EF 64%, and mild MR.  He underwent further evaluation with right and left coronary angiogram which revealed LAD with 50% midportion stenosis, left circumflex with 60 to 70% stenosis, RCA dominant with early atherosclerotic plaque, severe aortic gradient of 50 mmHg with severe aortic stenosis with DEXTER of 0.75, as well as mild pulmonary hypertension with PA pressures of 49 to 50 mmHg.  CT surgery was consulted, they did not feel he was a good candidate for AVR and recommended possible TAVR.  TAVR team was consulted, work-up is in progress and patient is to undergo TAVR soon.  Patient has been short of breath and given his significant history of COPD pulmonology was consulted as well.  They agree that he is a better candidate for TAVR.  Patient became short of air so d-dimer was checked, it was  found to be elevated.  CTA of the chest was performed with no evidence of PE noted, however there were findings concerning for possible bronchitis versus pneumonia.  Discussed with pulmonology, they would like to follow-up with him within 1 week of discharge-patient aware that he needs to call Monday morning to make an appointment.  He has remained in sinus rhythm throughout admission.  Blood pressure has been stable and controlled.  Electrolyte and kidney function monitored throughout admission, electrolytes replaced as needed.  It is suspected that he likely has undiagnosed MAURISIO.  Carotid Doppler performed revealed mild stenosis bilaterally.  Atorvastatin was increased, internal medicine was asked to follow the patient throughout admission regarding management of comorbidities.  All consulting providers attention to patient appreciated.  Per CT surgery okay to perform CTA outpatient.  Procol level normal when checked.  Hemoglobin A1c found to be elevated, DM 2 poorly controlled, patient to follow-up with PCP within 1 week of discharge.  He is discharged home in stable condition with no complaints of chest pain, shortness of breath worse than baseline, dizziness, weakness, syncope, or near syncope.  Follow-up directions as below.    Consults:   IP CONSULT TO ANESTHESIOLOGY  IP CONSULT TO PULMONOLOGY  IP CONSULT TO INTERNAL MEDICINE    Discharge Exam:  General: No acute distress; resting comfortably in chair   Skin: Warm and dry, no diaphoresis noted   EYES: PERTL   HEENT: external ear and nose normal; oral mucosa moist   Neck: Supple; no JVD   Heart: S1S2 regular rate and rhythm; no murmurs; no gallop or rub appreciated; right radial and brachial cardiac catheterization sites with no hematoma, erythema, edema, or drainage-mild ecchymosis noted; right radial, brachial, and ulnar pulses intact   Pulmonary: Respirations regular, unlabored at rest, bilateral breath sounds have good air entry throughout all lung fields;  no crackles, rubs or wheezes auscultated.     GI: Soft, non-tender, non-distended, positive bowel sounds     Extremities: Bilateral lower extremities have no pre-tibial pitting edema; DP/PT pulses are palpable   Neurological: Alert and oriented x 3; no neuro deficits          LABS:      Results from last 7 days   Lab Units 03/07/20  0308   MAGNESIUM mg/dL 1.8     Results from last 7 days   Lab Units 03/07/20  0308   SODIUM mmol/L 134*   POTASSIUM mmol/L 4.0   BUN mg/dL 13   CREATININE mg/dL 0.92   CALCIUM mg/dL 9.7       Results from last 7 days   Lab Units 03/07/20  0308 03/06/20  0438 03/05/20  0318   WBC 10*3/mm3 6.69 6.84 6.58   HEMOGLOBIN g/dL 13.9 14.2 14.0   HEMATOCRIT % 41.5 42.5 42.6   PLATELETS 10*3/mm3 205 210 202     Results from last 7 days   Lab Units 03/05/20  0318   INR  1.02   APTT seconds 33.8     Results from last 7 days   Lab Units 03/06/20  0438   CHOLESTEROL mg/dL 220*   TRIGLYCERIDES mg/dL 148   HDL CHOL mg/dL 35*   LDL CHOL mg/dL 155*     Disposition:  Home    Discharge Medications:      Discharge Medications      Changes to Medications      Instructions Start Date   atorvastatin 80 MG tablet  Commonly known as:  LIPITOR  What changed:    · medication strength  · how much to take  · how to take this  · when to take this   80 mg, Oral, Nightly         Continue These Medications      Instructions Start Date   albuterol sulfate  (90 Base) MCG/ACT inhaler  Commonly known as:  PROVENTIL HFA;VENTOLIN HFA;PROAIR HFA   2 puffs, Inhalation      amLODIPine 5 MG tablet  Commonly known as:  NORVASC   5 mg, Oral, Daily      aspirin 81 MG EC tablet   81 mg, Oral, Daily      budesonide-formoterol 160-4.5 MCG/ACT inhaler  Commonly known as:  SYMBICORT   2 puffs, Inhalation      clopidogrel 75 MG tablet  Commonly known as:  PLAVIX   No dose, route, or frequency recorded.      furosemide 40 MG tablet  Commonly known as:  LASIX   40 mg, Oral, Daily      lisinopril 40 MG tablet  Commonly known as:   PRINIVIL,ZESTRIL   40 mg, Oral, Daily      metFORMIN 500 MG tablet  Commonly known as:  GLUCOPHAGE   500 mg, Oral, Daily With Breakfast      rOPINIRole 4 MG tablet  Commonly known as:  REQUIP   No dose, route, or frequency recorded.      tiotropium 18 MCG per inhalation capsule  Commonly known as:  SPIRIVA   1 capsule, Inhalation, Daily - RT           Estimated Creatinine Clearance: 93 mL/min (by C-G formula based on SCr of 0.92 mg/dL).    Discharge Home Instructions:     Discharge Follow-up with PCP    Currently Documented PCP:   Micah Michel MD   PCP Phone Number:   [phone]   Discharge Follow-up with Specified Provider: Follow-up with Dr. Mariee within 1 month of discharge.    Discharge Follow-up with Specified Provider: Follow-up with Dr. Montenegro and TAVR team as scheduled.    Discharge Follow-up with Specified Provider: Follow-up with pulmonology within 1 week of discharge.    Discharge Instructions    Routine post cardiac catheterization/PCI discharge home care instructions:    1. No submerging procedure site below water for 7-10 days.  2. No lifting objects greater than 1 lbs for 3 days.  3. If groin site used, avoid climbing several flights of stairs or sitting for longer than 2 hours at a time for the next 24 hours.   4. Monitor puncture site for bleeding and/or knots;. If bleeding should occur at the groin site: lie flat, apply pressure and return to the ER. If bleeding should occur at the wrist site, apply pressure and return to the ER.  5.  You may apply a DRY Band-Aid over the puncture site if needed. Do not apply any lotions, salves or ointments to site.  6. No driving for 3 days.  7. Return to ER for recurrent symptoms.  8. No smoking.  9. Take all medications as prescribed.  10.  Please follow-up with PCP within 1 week of discharge.  11.  Please follow-up with pulmonology within 1 week of discharge.  Please call Monday to schedule this appointment.  12.  Please follow-up with   "Jaylene within 2 weeks of discharge.  13.  Please follow-up with Dr. Montenegro and TAVR team as scheduled.  14.  If you experience any chest pain, shortness of breath that is worse than normal, or pass out please return through the emergency department.  15.  Increase atorvastatin to 80 mg daily.       Signed:  Justine Keller, APRN  3/7/2020  3:07 PM      EMR Dragon/Transcription:   \"Dictated utilizing Dragon dictation\".   "

## 2020-03-07 NOTE — PROGRESS NOTES
" LOS: 3 days   Patient Care Team:  Micah Michel MD as PCP - General (Internal Medicine)  Marya Boyd APRN as PCP - Claims Attributed    Chief Complaint: aortic stenosis    Subjective  C/o slight constipation this morning-wanting to go home    Vital Signs  Temp:  [97.5 °F (36.4 °C)-98.3 °F (36.8 °C)] 98.3 °F (36.8 °C)  Heart Rate:  [65-96] 81  Resp:  [16-22] 18  BP: (107-141)/(59-86) 132/75  Body mass index is 35.22 kg/m².    Intake/Output Summary (Last 24 hours) at 3/7/2020 0939  Last data filed at 3/7/2020 0638  Gross per 24 hour   Intake 960 ml   Output --   Net 960 ml     No intake/output data recorded.          03/04/20  1053   Weight: 108 kg (238 lb 8 oz)     Objective:  General Appearance:  Comfortable and in no acute distress.    Vital signs: (most recent): Blood pressure 132/75, pulse 81, temperature 98.3 °F (36.8 °C), temperature source Oral, resp. rate 18, height 175.3 cm (69\"), weight 108 kg (238 lb 8 oz), SpO2 91 %.  Vital signs are normal.  No fever.    Output: Producing urine.    Lungs:  Normal effort and normal respiratory rate.  There are decreased breath sounds, wheezes and rhonchi.    Heart: Normal rate.  Regular rhythm.  (SR on tele monitor with frequent PACs)  Abdomen: Abdomen is soft and distended.  Bowel sounds are normal.     Extremities: There is dependent edema.  (Mild edema bilateral LE)  Pulses: Distal pulses are intact.    Skin:  Warm and dry.  (Generalized redness)        Results Review:        WBC WBC   Date Value Ref Range Status   03/07/2020 6.69 3.40 - 10.80 10*3/mm3 Final   03/06/2020 6.84 3.40 - 10.80 10*3/mm3 Final   03/05/2020 6.58 3.40 - 10.80 10*3/mm3 Final      HGB Hemoglobin   Date Value Ref Range Status   03/07/2020 13.9 13.0 - 17.7 g/dL Final   03/06/2020 14.2 13.0 - 17.7 g/dL Final   03/05/2020 14.0 13.0 - 17.7 g/dL Final   03/04/2020 15.3 12.0 - 17.0 g/dL Final   03/04/2020 15.3 12.0 - 17.0 g/dL Final      HCT Hematocrit   Date Value Ref Range Status   03/07/2020 " 41.5 37.5 - 51.0 % Final   03/06/2020 42.5 37.5 - 51.0 % Final   03/05/2020 42.6 37.5 - 51.0 % Final   03/04/2020 45 38 - 51 % Final   03/04/2020 45 38 - 51 % Final      Platelets Platelets   Date Value Ref Range Status   03/07/2020 205 140 - 450 10*3/mm3 Final   03/06/2020 210 140 - 450 10*3/mm3 Final   03/05/2020 202 140 - 450 10*3/mm3 Final        PT/INR:    Protime   Date Value Ref Range Status   03/05/2020 13.1 11.7 - 14.2 Seconds Final   /  INR   Date Value Ref Range Status   03/05/2020 1.02 0.90 - 1.10 Final       Sodium Sodium   Date Value Ref Range Status   03/07/2020 134 (L) 136 - 145 mmol/L Final   03/06/2020 136 136 - 145 mmol/L Final   03/05/2020 137 136 - 145 mmol/L Final      Potassium Potassium   Date Value Ref Range Status   03/07/2020 4.0 3.5 - 5.2 mmol/L Final   03/06/2020 4.0 3.5 - 5.2 mmol/L Final   03/05/2020 4.1 3.5 - 5.2 mmol/L Final      Chloride Chloride   Date Value Ref Range Status   03/07/2020 89 (L) 98 - 107 mmol/L Final   03/06/2020 90 (L) 98 - 107 mmol/L Final   03/05/2020 93 (L) 98 - 107 mmol/L Final      Bicarbonate CO2   Date Value Ref Range Status   03/07/2020 34.7 (H) 22.0 - 29.0 mmol/L Final   03/06/2020 34.5 (H) 22.0 - 29.0 mmol/L Final   03/05/2020 31.9 (H) 22.0 - 29.0 mmol/L Final      BUN BUN   Date Value Ref Range Status   03/07/2020 13 8 - 23 mg/dL Final   03/06/2020 13 8 - 23 mg/dL Final   03/05/2020 12 8 - 23 mg/dL Final      Creatinine Creatinine   Date Value Ref Range Status   03/07/2020 0.92 0.76 - 1.27 mg/dL Final   03/06/2020 0.83 0.76 - 1.27 mg/dL Final   03/05/2020 0.92 0.76 - 1.27 mg/dL Final      Calcium Calcium   Date Value Ref Range Status   03/07/2020 9.7 8.6 - 10.5 mg/dL Final   03/06/2020 10.2 8.6 - 10.5 mg/dL Final   03/05/2020 9.7 8.6 - 10.5 mg/dL Final      Magnesium Magnesium   Date Value Ref Range Status   03/07/2020 1.8 1.6 - 2.4 mg/dL Final   03/06/2020 1.6 1.6 - 2.4 mg/dL Final   03/05/2020 1.6 1.6 - 2.4 mg/dL Final            amLODIPine 5 mg Oral  Daily   aspirin 81 mg Oral Daily   atorvastatin 80 mg Oral Nightly   budesonide 0.5 mg Nebulization BID - RT   clopidogrel 75 mg Oral Daily   folic acid 1 mg Oral Daily   furosemide 40 mg Oral Daily   insulin lispro 0-7 Units Subcutaneous 4x Daily With Meals & Nightly   ipratropium-albuterol 3 mL Nebulization Q4H - RT   metoprolol tartrate 25 mg Oral Q12H   multivitamin 1 tablet Oral Daily   nicotine 1 patch Transdermal Q24H   pantoprazole 40 mg Oral Q AM   rOPINIRole 4 mg Oral Nightly   sodium chloride 4 mL Nebulization BID - RT   thiamine 100 mg Oral Daily          Patient Active Problem List   Diagnosis Code   • Aortic valve stenosis I35.0   • Chronic obstructive pulmonary disease (CMS/HCC) J44.9   • Essential hypertension I10   • Hyperlipidemia E78.5   • SOB (shortness of breath) R06.02   • Overweight E66.3   • Nonrheumatic aortic valve stenosis I35.0       Assessment & Plan   -Severe aortic stenosis-possible TAVR work up  - CAD--medical management  -Obesity   -probable sleep apnea  -current tobacco abuse--nicotine patch in place  -ETOH 4-5 beers a day  -COPD  -Diabetes type II- A1C 8.13  -hypertension  -PAD- s/p popliteal artery stent- plavix restarted   -nocturnal oxygen at home     Due to patients pulmonary status it was decided that he was a better candidate for TAVR rather than SAVR--TAVR team now involved  Pulmonary to rule out PNA--procalcitonin not elevated and without white count  Encourage pulmonary toilet  Abdominal distention, saying he feels constipated--refusing to take anything here to help him go  Elevated D-dimer--CTA negative for PE  [facility] protocol for possible alcohol withdrawal--will add vitamins/PRN librium  Will need TAVR CTA--given patients vascular history to see if we would have femoral access--this would probably be okay to do outpatient    January Stout, PHONG  03/07/20  9:39 AM     Plan for TAVR protocol CTA in near future.  All parties agreed that he is a reasonable candidate  for TAVR.  Okay from my standpoint to discharge home.

## 2020-03-07 NOTE — NURSING NOTE
Patient place on Room air at rest in room. After 1 min patient desaturated to 86% oxygen on room air.  3L 02 placed on patient. Patient oxygen saturation increased to 93%.  Pulmonologist paged regarding continuous oxygen need.

## 2020-03-07 NOTE — PROGRESS NOTES
Continued Stay Note  [facility]     Patient Name: Fili Ladd  MRN: [id number]  Today's Date: 3/7/2020    Admit Date: 3/4/2020    Discharge Plan     Row Name 03/07/20 1653       Plan    Plan  Home with continuous O2 from [facility]. Family will transport...........Charanjit RN     Plan Comments  Call from RN stating patient will need continuous O2 at DC, she contacted MD to place order. Appropriate documentation of need in EPIC. Pt. wishes to use [facility] and will need tank delivered prior to DC. Call to [facility] customer service and S/W Sir[facility], states she will watch for information and have  bring tank as soon as possible..........Charanjit RN         Discharge Codes    No documentation.       Expected Discharge Date and Time     Expected Discharge Date Expected Discharge Time    Mar 7, 2020             Shantell Prescott, RN

## 2020-03-07 NOTE — DISCHARGE PLACEMENT REQUEST
"Franky Turner (69 y.o. Male)     Date of Birth Social Security Number Address Home Phone MRN    1950  [id number]  [place] [address] [phone] [id number]    [employer] Marital Status          None        Admission Date Admission Type Admitting Provider Attending Provider Department, Room/Bed    3/4/20 Elective Paulino Mariee MD Chandiramani, Shanker, MD [facility] CARDIOVASC UNIT, 2227/1    Discharge Date Discharge Disposition Discharge Destination         Home or Self Care              Attending Provider:  Paulino Mariee MD    Allergies:  Penicillins, Sulfa Antibiotics    Isolation:  None   Infection:  None   Code Status:  CPR    Ht:  175.3 cm (69\")   Wt:  108 kg (238 lb 8 oz)    Admission Cmt:  None   Principal Problem:  SOB (shortness of breath) [R06.02]                 Active Insurance as of 3/4/2020     Primary Coverage     Payor Plan Insurance Group Employer/Plan Group    ANTH MEDICARE REPLACEMENT ANTH MEDICARE ADVANTAGE [id number]     Payor Plan Address Payor Plan Phone Number Payor Plan Fax Number Effective Dates    [address] [phone]  1/1/2018 - None Entered    [facility] [address]       Subscriber Name Subscriber Birth Date Member ID       FRANKY TURNER 1950 [id number]                 Emergency Contacts      (Rel.) Home Phone Work Phone Mobile Phone    Orquidea Rascon (Significant Other) [phone] -- --          "

## 2020-03-08 ENCOUNTER — READMISSION MANAGEMENT (OUTPATIENT)
Dept: CALL CENTER | Facility: HOSPITAL | Age: 70
End: 2020-03-08

## 2020-03-08 NOTE — OUTREACH NOTE
Prep Survey      Responses   [facility] patient discharged from?  [place]   Is LACE score < 7 ?  No   Eligibility  Readm Mgmt   Discharge diagnosis  Severe aortic stenosis, shortness of breath, acute on chronic hypoxic respiratory failure   Does the patient have one of the following disease processes/diagnoses(primary or secondary)?  Other   Does the patient have Home health ordered?  No   Is there a DME ordered?  Yes   What DME was ordered?  [facility]   Prep survey completed?  Yes          Domitila Good RN

## 2020-03-09 ENCOUNTER — TELEPHONE (OUTPATIENT)
Dept: CARDIOLOGY | Facility: CLINIC | Age: 70
End: 2020-03-09

## 2020-03-09 DIAGNOSIS — I35.0 NONRHEUMATIC AORTIC VALVE STENOSIS: Primary | ICD-10-CM

## 2020-03-09 NOTE — PROGRESS NOTES
Case Management Discharge Note      Final Note: Pt d/c home 3/7/2020 with [facility] scheduled to supply oxygen.  BRIAN MARTELL/CCP    Provided Post Acute Provider List?: Yes  Post Acute Provider List: Home Health  Delivered To: Patient  Method of Delivery: In person    Destination      No service has been selected for the patient.      Durable Medical Equipment - Selection Complete      Service Provider Request Status Selected Services Address Phone Number Fax Number    [facility] Selected Durable Medical Equipment [address] [address] [phone]      Dialysis/Infusion      No service has been selected for the patient.      Home Medical Care      No service has been selected for the patient.      Therapy      No service has been selected for the patient.      Community Resources      No service has been selected for the patient.             Final Discharge Disposition Code: 01 - home or self-care

## 2020-03-10 ENCOUNTER — READMISSION MANAGEMENT (OUTPATIENT)
Dept: CALL CENTER | Facility: HOSPITAL | Age: 70
End: 2020-03-10

## 2020-03-10 NOTE — OUTREACH NOTE
Medical Week 1 Survey      Responses   [facility] patient discharged from?  [place]   Does the patient have one of the following disease processes/diagnoses(primary or secondary)?  Other   Is there a successful TCM telephone encounter documented?  No   Week 1 attempt successful?  Yes   Call start time  1451   Call end time  1458   Meds reviewed with patient/caregiver?  Yes   Is the patient having any side effects they believe may be caused by any medication additions or changes?  No   Does the patient have all medications ordered at discharge?  N/A   Is the patient taking all medications as directed (includes completed medication regime)?  Yes   Comments regarding appointments  CT scan 3/11/20,  cardiology appt 3/23/20,  CT surgery appt,  Pulm appt 4/16/20   Does the patient have a primary care provider?   Yes   Comments regarding PCP  Encouraged pt to schedule appt    Has the patient kept scheduled appointments due by today?  N/A   What DME was ordered?  [facility]   Has all DME been delivered?  Yes   DME comments  Pt wears O2 all the time   Psychosocial issues?  No   Did the patient receive a copy of their discharge instructions?  Yes   Nursing interventions  Reviewed instructions with patient   What is the patient's perception of their health status since discharge?  Improving [Pt reports his shortness of breath has improved ]   Is the patient/caregiver able to teach back signs and symptoms related to disease process for when to call PCP?  Yes   Is the patient/caregiver able to teach back signs and symptoms related to disease process for when to call 911?  Yes   Is the patient/caregiver able to teach back the hierarchy of who to call/visit for symptoms/problems? PCP, Specialist, Home health nurse, Urgent Care, ED, 911  Yes   If the patient is a current smoker, are they able to teach back resources for cessation?  -- [Pt reports he's not smoking. He's using patches.]   Week 1 call completed?  Yes           Becky Graham RN

## 2020-03-11 ENCOUNTER — HOSPITAL ENCOUNTER (OUTPATIENT)
Dept: CT IMAGING | Facility: HOSPITAL | Age: 70
Discharge: HOME | End: 2020-03-11
Admitting: INTERNAL MEDICINE

## 2020-03-11 DIAGNOSIS — I35.0 NONRHEUMATIC AORTIC VALVE STENOSIS: ICD-10-CM

## 2020-03-11 LAB — CREAT BLDA-MCNC: 1 MG/DL (ref 0.6–1.3)

## 2020-03-11 PROCEDURE — 82565 ASSAY OF CREATININE: CPT

## 2020-03-11 PROCEDURE — 0 IOPAMIDOL PER 1 ML: Performed by: INTERNAL MEDICINE

## 2020-03-11 PROCEDURE — 71275 CT ANGIOGRAPHY CHEST: CPT

## 2020-03-11 PROCEDURE — 74174 CTA ABD&PLVS W/CONTRAST: CPT

## 2020-03-11 RX ADMIN — IOPAMIDOL 95 ML: 755 INJECTION, SOLUTION INTRAVENOUS at 12:50

## 2020-03-13 ENCOUNTER — PREP FOR SURGERY (OUTPATIENT)
Dept: OTHER | Facility: HOSPITAL | Age: 70
End: 2020-03-13

## 2020-03-13 DIAGNOSIS — R79.1 ABNORMAL COAGULATION PROFILE: ICD-10-CM

## 2020-03-13 DIAGNOSIS — I35.0 NONRHEUMATIC AORTIC VALVE STENOSIS: Primary | ICD-10-CM

## 2020-03-13 DIAGNOSIS — R79.9 ABNORMAL FINDING OF BLOOD CHEMISTRY, UNSPECIFIED: ICD-10-CM

## 2020-03-13 DIAGNOSIS — I50.30 DIASTOLIC CHF DUE TO VALVULAR DISEASE (HCC): ICD-10-CM

## 2020-03-13 DIAGNOSIS — I11.0 HYPERTENSIVE HEART DISEASE WITH HEART FAILURE (HCC): ICD-10-CM

## 2020-03-13 DIAGNOSIS — I35.0 AORTIC VALVE STENOSIS, ETIOLOGY OF CARDIAC VALVE DISEASE UNSPECIFIED: Primary | ICD-10-CM

## 2020-03-13 DIAGNOSIS — I38 DIASTOLIC CHF DUE TO VALVULAR DISEASE (HCC): ICD-10-CM

## 2020-03-13 RX ORDER — CHLORHEXIDINE GLUCONATE 0.12 MG/ML
15 RINSE ORAL EVERY 12 HOURS
Status: CANCELLED | OUTPATIENT
Start: 2020-03-13 | End: 2020-03-14

## 2020-03-13 RX ORDER — CHLORHEXIDINE GLUCONATE 0.12 MG/ML
15 RINSE ORAL ONCE
Status: CANCELLED | OUTPATIENT
Start: 2020-03-17 | End: 2020-03-13

## 2020-03-16 ENCOUNTER — APPOINTMENT (OUTPATIENT)
Dept: PREADMISSION TESTING | Facility: HOSPITAL | Age: 70
End: 2020-03-16

## 2020-03-16 ENCOUNTER — HOSPITAL ENCOUNTER (OUTPATIENT)
Dept: GENERAL RADIOLOGY | Facility: HOSPITAL | Age: 70
Discharge: HOME | End: 2020-03-16
Admitting: PHYSICIAN ASSISTANT

## 2020-03-16 ENCOUNTER — ANESTHESIA EVENT (OUTPATIENT)
Dept: PERIOP | Facility: HOSPITAL | Age: 70
End: 2020-03-16

## 2020-03-16 VITALS
OXYGEN SATURATION: 90 % | HEART RATE: 93 BPM | BODY MASS INDEX: 37.24 KG/M2 | WEIGHT: 245.7 LBS | RESPIRATION RATE: 20 BRPM | DIASTOLIC BLOOD PRESSURE: 63 MMHG | HEIGHT: 68 IN | TEMPERATURE: 97.7 F | SYSTOLIC BLOOD PRESSURE: 119 MMHG

## 2020-03-16 DIAGNOSIS — R79.1 ABNORMAL COAGULATION PROFILE: ICD-10-CM

## 2020-03-16 DIAGNOSIS — I11.0 HYPERTENSIVE HEART DISEASE WITH HEART FAILURE (HCC): ICD-10-CM

## 2020-03-16 DIAGNOSIS — R79.9 ABNORMAL FINDING OF BLOOD CHEMISTRY, UNSPECIFIED: ICD-10-CM

## 2020-03-16 DIAGNOSIS — I35.0 AORTIC VALVE STENOSIS, ETIOLOGY OF CARDIAC VALVE DISEASE UNSPECIFIED: ICD-10-CM

## 2020-03-16 PROBLEM — I50.30 DIASTOLIC CHF DUE TO VALVULAR DISEASE: Status: ACTIVE | Noted: 2020-03-13

## 2020-03-16 PROBLEM — I38 DIASTOLIC CHF DUE TO VALVULAR DISEASE (HCC): Status: ACTIVE | Noted: 2020-03-13

## 2020-03-16 LAB
ABO GROUP BLD: NORMAL
ALBUMIN SERPL-MCNC: 4.1 G/DL (ref 3.5–5.2)
ALBUMIN/GLOB SERPL: 1.3 G/DL
ALP SERPL-CCNC: 77 U/L (ref 39–117)
ALT SERPL W P-5'-P-CCNC: 17 U/L (ref 1–41)
ANION GAP SERPL CALCULATED.3IONS-SCNC: 12.8 MMOL/L (ref 5–15)
ANISOCYTOSIS BLD QL: ABNORMAL
APTT PPP: 33.7 SECONDS (ref 22.7–35.4)
AST SERPL-CCNC: 22 U/L (ref 1–40)
BACTERIA UR QL AUTO: NORMAL /HPF
BASOPHILS # BLD MANUAL: 0.15 10*3/MM3 (ref 0–0.2)
BASOPHILS NFR BLD AUTO: 2 % (ref 0–1.5)
BILIRUB SERPL-MCNC: 0.4 MG/DL (ref 0.2–1.2)
BILIRUB UR QL STRIP: NEGATIVE
BLD GP AB SCN SERPL QL: NEGATIVE
BUN BLD-MCNC: 12 MG/DL (ref 8–23)
BUN/CREAT SERPL: 12.2 (ref 7–25)
CALCIUM SPEC-SCNC: 9.4 MG/DL (ref 8.6–10.5)
CHLORIDE SERPL-SCNC: 86 MMOL/L (ref 98–107)
CLARITY UR: CLEAR
CLOSE TME COLL+ADP + EPINEP PNL BLD: 50 %
CO2 SERPL-SCNC: 37.2 MMOL/L (ref 22–29)
COLOR UR: YELLOW
CREAT BLD-MCNC: 0.98 MG/DL (ref 0.76–1.27)
DEPRECATED RDW RBC AUTO: 39.6 FL (ref 37–54)
EOSINOPHIL # BLD MANUAL: 0.54 10*3/MM3 (ref 0–0.4)
EOSINOPHIL NFR BLD MANUAL: 7 % (ref 0.3–6.2)
ERYTHROCYTE [DISTWIDTH] IN BLOOD BY AUTOMATED COUNT: 12.8 % (ref 12.3–15.4)
GFR SERPL CREATININE-BSD FRML MDRD: 76 ML/MIN/1.73
GLOBULIN UR ELPH-MCNC: 3.2 GM/DL
GLUCOSE BLD-MCNC: 155 MG/DL (ref 65–99)
GLUCOSE UR STRIP-MCNC: NEGATIVE MG/DL
HBA1C MFR BLD: 8 % (ref 4.8–5.6)
HCT VFR BLD AUTO: 44 % (ref 37.5–51)
HGB BLD-MCNC: 14.4 G/DL (ref 13–17.7)
HGB UR QL STRIP.AUTO: NEGATIVE
HYALINE CASTS UR QL AUTO: NORMAL /LPF
INR PPP: 1 (ref 0.9–1.1)
KETONES UR QL STRIP: NEGATIVE
LEUKOCYTE ESTERASE UR QL STRIP.AUTO: NEGATIVE
LYMPHOCYTES # BLD MANUAL: 1.23 10*3/MM3 (ref 0.7–3.1)
LYMPHOCYTES NFR BLD MANUAL: 16 % (ref 19.6–45.3)
LYMPHOCYTES NFR BLD MANUAL: 9 % (ref 5–12)
MCH RBC QN AUTO: 28 PG (ref 26.6–33)
MCHC RBC AUTO-ENTMCNC: 32.7 G/DL (ref 31.5–35.7)
MCV RBC AUTO: 85.4 FL (ref 79–97)
MONOCYTES # BLD AUTO: 0.69 10*3/MM3 (ref 0.1–0.9)
NEUTROPHILS # BLD AUTO: 5.06 10*3/MM3 (ref 1.7–7)
NEUTROPHILS NFR BLD MANUAL: 66 % (ref 42.7–76)
NITRITE UR QL STRIP: NEGATIVE
NT-PROBNP SERPL-MCNC: 284.4 PG/ML (ref 5–900)
PH UR STRIP.AUTO: 6.5 [PH] (ref 5–8)
PLAT MORPH BLD: NORMAL
PLATELET # BLD AUTO: 217 10*3/MM3 (ref 140–450)
PMV BLD AUTO: 9.2 FL (ref 6–12)
POLYCHROMASIA BLD QL SMEAR: ABNORMAL
POTASSIUM BLD-SCNC: 4.5 MMOL/L (ref 3.5–5.2)
PROT SERPL-MCNC: 7.3 G/DL (ref 6–8.5)
PROT UR QL STRIP: ABNORMAL
PROTHROMBIN TIME: 12.8 SECONDS (ref 11.7–14.2)
RBC # BLD AUTO: 5.15 10*6/MM3 (ref 4.14–5.8)
RBC # UR: NORMAL /HPF
REF LAB TEST METHOD: NORMAL
RH BLD: POSITIVE
SODIUM BLD-SCNC: 136 MMOL/L (ref 136–145)
SP GR UR STRIP: 1.01 (ref 1–1.03)
SQUAMOUS #/AREA URNS HPF: NORMAL /HPF
T&S EXPIRATION DATE: NORMAL
UROBILINOGEN UR QL STRIP: ABNORMAL
WBC MORPH BLD: NORMAL
WBC NRBC COR # BLD: 7.66 10*3/MM3 (ref 3.4–10.8)
WBC UR QL AUTO: NORMAL /HPF

## 2020-03-16 PROCEDURE — 85007 BL SMEAR W/DIFF WBC COUNT: CPT | Performed by: PHYSICIAN ASSISTANT

## 2020-03-16 PROCEDURE — 85730 THROMBOPLASTIN TIME PARTIAL: CPT | Performed by: PHYSICIAN ASSISTANT

## 2020-03-16 PROCEDURE — 85610 PROTHROMBIN TIME: CPT | Performed by: PHYSICIAN ASSISTANT

## 2020-03-16 PROCEDURE — 80053 COMPREHEN METABOLIC PANEL: CPT | Performed by: PHYSICIAN ASSISTANT

## 2020-03-16 PROCEDURE — 86901 BLOOD TYPING SEROLOGIC RH(D): CPT | Performed by: PHYSICIAN ASSISTANT

## 2020-03-16 PROCEDURE — 71046 X-RAY EXAM CHEST 2 VIEWS: CPT

## 2020-03-16 PROCEDURE — 86850 RBC ANTIBODY SCREEN: CPT | Performed by: PHYSICIAN ASSISTANT

## 2020-03-16 PROCEDURE — 85025 COMPLETE CBC W/AUTO DIFF WBC: CPT | Performed by: PHYSICIAN ASSISTANT

## 2020-03-16 PROCEDURE — 93010 ELECTROCARDIOGRAM REPORT: CPT | Performed by: INTERNAL MEDICINE

## 2020-03-16 PROCEDURE — 83880 ASSAY OF NATRIURETIC PEPTIDE: CPT | Performed by: PHYSICIAN ASSISTANT

## 2020-03-16 PROCEDURE — 86900 BLOOD TYPING SEROLOGIC ABO: CPT | Performed by: PHYSICIAN ASSISTANT

## 2020-03-16 PROCEDURE — 81001 URINALYSIS AUTO W/SCOPE: CPT | Performed by: PHYSICIAN ASSISTANT

## 2020-03-16 PROCEDURE — 85576 BLOOD PLATELET AGGREGATION: CPT | Performed by: PHYSICIAN ASSISTANT

## 2020-03-16 PROCEDURE — 83036 HEMOGLOBIN GLYCOSYLATED A1C: CPT | Performed by: PHYSICIAN ASSISTANT

## 2020-03-16 PROCEDURE — 36415 COLL VENOUS BLD VENIPUNCTURE: CPT

## 2020-03-16 PROCEDURE — 93005 ELECTROCARDIOGRAM TRACING: CPT

## 2020-03-16 RX ORDER — CHLORHEXIDINE GLUCONATE 0.12 MG/ML
15 RINSE ORAL EVERY 12 HOURS
Status: DISPENSED | OUTPATIENT
Start: 2020-03-16 | End: 2020-03-17

## 2020-03-16 RX ORDER — ALBUTEROL SULFATE 1.25 MG/3ML
1 SOLUTION RESPIRATORY (INHALATION) 4 TIMES DAILY
COMMUNITY

## 2020-03-16 NOTE — ANESTHESIA PREPROCEDURE EVALUATION
Anesthesia Evaluation     Patient summary reviewed and Nursing notes reviewed                Airway   Mallampati: III  Possible difficult intubation  Dental    (+) poor dentition    Pulmonary    (+) a smoker Current, COPD severe, shortness of breath, sleep apnea,   Cardiovascular     ECG reviewed  Rhythm: irregular  Rate: normal    (+) hypertension, valvular problems/murmurs AS and murmur, PVD, hyperlipidemia,       Neuro/Psych- negative ROS  GI/Hepatic/Renal/Endo    (+) morbid obesity, GERD,  diabetes mellitus type 2,     Musculoskeletal (-) negative ROS    Abdominal    Substance History - negative use     OB/GYN negative ob/gyn ROS         Other                        Anesthesia Plan    ASA 4     MAC   (No upper teeth and lower teeth are in poor condition  Severe COPD  Sleep apnea  Inability to sleep on his back secondary to airway obstruction   Very large abdomen  Bearded  Airway control will be challenging possibly requiring pressure support and/or GETA   Oral /nasal trumpets with pressure support may be necessary to do this case under MAC however patient informed a GETA is not unlikely  Art line/6 fr introducer planned pre op)  intravenous induction     Anesthetic plan, all risks, benefits, and alternatives have been provided, discussed and informed consent has been obtained with: patient.

## 2020-03-16 NOTE — DISCHARGE INSTRUCTIONS
Take the following medications the morning of surgery: AS PER SUMANTH KU        General Instructions:  • Do not eat solid food after midnight the night before surgery.  • You may drink clear liquids day of surgery but must stop at least one hour before your hospital arrival time.  • It is beneficial for you to have a clear drink that contains carbohydrates the day of surgery.  We suggest a 12 to 20 ounce bottle of Gatorade or Powerade for non-diabetic patients or a 12 to 20 ounce bottle of G2 or Powerade Zero for diabetic patients.    Clear liquids are liquids you can see through.  Nothing red in color.     Plain water                               Sports drinks  Sodas                                   Gelatin (Jell-O)  Fruit juices without pulp such as white grape juice and apple juice  Popsicles that contain no fruit or yogurt  Tea or coffee (no cream or milk added)  Gatorade / Powerade  G2 / Powerade Zero    • Bring any papers given to you in the doctor’s office.  • Wear clean comfortable clothes.  • Do not wear contact lenses, false eyelashes or make-up.  Bring a case for your glasses.   • Remove all piercings.  Leave jewelry and any other valuables at home.  • The Pre-Admission Testing nurse will instruct you to bring medications if unable to obtain an accurate list in Pre-Admission Testing.        If you were given a blood bank ID arm band remember to bring it with you the day of surgery.    Preventing a Surgical Site Infection:  • For 2 to 3 days before surgery, avoid shaving with a razor because the razor can irritate skin and make it easier to develop an infection.    • Any areas of open skin can increase the risk of a post-operative wound infection by allowing bacteria to enter and travel throughout the body.  Notify your surgeon if you have any skin wounds / rashes even if it is not near the expected surgical site.  The area will need assessed to determine if surgery should be delayed until it is  healed.  • The night prior to surgery shower using a fresh bar of anti-bacterial soap (such as Dial) and clean washcloth.  Sleep in a clean bed with clean clothing.  Do not allow pets to sleep with you.  • Shower on the morning of surgery using a fresh bar of anti-bacterial soap (such as Dial) and clean washcloth.  Dry with a clean towel and dress in clean clothing.  • Ask your surgeon if you will be receiving antibiotics prior to surgery.  • Make sure you, your family, and all healthcare providers clean their hands with soap and water or an alcohol based hand  before caring for you or your wound.    Day of surgery: 3/17/2020. MAIN OR. ARRIVAL TIME 5 AM  Your arrival time is approximately two hours before your scheduled surgery time.  Upon arrival, a Pre-op nurse and Anesthesiologist will review your health history, obtain vital signs, and answer questions you may have.  The only belongings needed at this time will be a list of your home medications and if applicable your C-PAP/BI-PAP machine.  If you are staying overnight your family can leave the rest of your belongings in the car and bring them to your room later.  A Pre-op nurse will start an IV and you may receive medication in preparation for surgery, including something to help you relax.  Your family will be able to see you in the Pre-op area.  Two visitors at a time will be allowed in the Pre-op room.  While you are in surgery your family should notify the waiting room  if they leave the waiting room area and provide a contact phone number.    Please be aware that surgery does come with discomfort.  We want to make every effort to control your discomfort so please discuss any uncontrolled symptoms with your nurse.   Your doctor will most likely have prescribed pain medications.          If you are staying overnight following surgery, you will be transported to your hospital room following the recovery period.  [facility]  has all private rooms.    If you have any questions please call Pre-Admission Testing at [phone].  Deductibles and co-payments are collected on the day of service. Please be prepared to pay the required co-pay, deductible or deposit on the day of service as defined by your plan.      BACTROBAN NASAL OINTMENT  There are many germs normally in your nose. Bactroban is an ointment that will help reduce these germs. Please follow these instructions for Bactroban use:      _1____The day before surgery in the evening              Date__3/16______    _2___The day of surgery in the morning    Date__3/17______    **Squirt ½ package of Bactroban Ointment onto a cotton applicator and apply to inside of 1st nostril.  Squirt the remaining Bactroban and apply to the inside of the other nostril.    PERIDEX- ORAL:  Use only if your surgeon has ordered  Use the night before and morning of surgery - Swish, gargle, and spit - do not swallow.

## 2020-03-17 ENCOUNTER — ANCILLARY PROCEDURE (OUTPATIENT)
Dept: PERIOP | Facility: HOSPITAL | Age: 70
End: 2020-03-17

## 2020-03-17 ENCOUNTER — APPOINTMENT (OUTPATIENT)
Dept: GENERAL RADIOLOGY | Facility: HOSPITAL | Age: 70
End: 2020-03-17

## 2020-03-17 ENCOUNTER — ANESTHESIA (OUTPATIENT)
Dept: PERIOP | Facility: HOSPITAL | Age: 70
End: 2020-03-17

## 2020-03-17 ENCOUNTER — HOSPITAL ENCOUNTER (INPATIENT)
Facility: HOSPITAL | Age: 70
LOS: 3 days | Discharge: HOME | End: 2020-03-20
Attending: THORACIC SURGERY (CARDIOTHORACIC VASCULAR SURGERY) | Admitting: THORACIC SURGERY (CARDIOTHORACIC VASCULAR SURGERY)

## 2020-03-17 DIAGNOSIS — I38 DIASTOLIC CHF DUE TO VALVULAR DISEASE (HCC): ICD-10-CM

## 2020-03-17 DIAGNOSIS — I50.30 DIASTOLIC CHF DUE TO VALVULAR DISEASE (HCC): ICD-10-CM

## 2020-03-17 DIAGNOSIS — I35.0 NONRHEUMATIC AORTIC VALVE STENOSIS: ICD-10-CM

## 2020-03-17 DIAGNOSIS — I35.0 NONRHEUMATIC AORTIC VALVE STENOSIS: Primary | ICD-10-CM

## 2020-03-17 LAB
ACT BLD: 131 SECONDS (ref 82–152)
ACT BLD: 142 SECONDS (ref 82–152)
ACT BLD: 180 SECONDS (ref 82–152)
ACT BLD: 213 SECONDS (ref 82–152)
ACT BLD: 235 SECONDS (ref 82–152)
ACT BLD: 356 SECONDS (ref 82–152)
ACT BLD: 417 SECONDS (ref 82–152)
ACT BLD: 510 SECONDS (ref 82–152)
ALBUMIN SERPL-MCNC: 3.5 G/DL (ref 3.5–5.2)
ANION GAP SERPL CALCULATED.3IONS-SCNC: 15.2 MMOL/L (ref 5–15)
ANION GAP SERPL CALCULATED.3IONS-SCNC: 16.1 MMOL/L (ref 5–15)
ANISOCYTOSIS BLD QL: ABNORMAL
APTT PPP: >200 SECONDS (ref 22.7–35.4)
ARTERIAL PATENCY WRIST A: ABNORMAL
ARTERIAL PATENCY WRIST A: ABNORMAL
ATMOSPHERIC PRESS: 757.2 MMHG
ATMOSPHERIC PRESS: 757.9 MMHG
BASE EXCESS BLDA CALC-SCNC: 10 MMOL/L (ref -5–5)
BASE EXCESS BLDA CALC-SCNC: 11 MMOL/L (ref -5–5)
BASE EXCESS BLDA CALC-SCNC: 11 MMOL/L (ref -5–5)
BASE EXCESS BLDA CALC-SCNC: 15 MMOL/L (ref -5–5)
BASE EXCESS BLDA CALC-SCNC: 5.9 MMOL/L (ref 0–2)
BASE EXCESS BLDA CALC-SCNC: 9.6 MMOL/L (ref 0–2)
BASOPHILS # BLD MANUAL: 0.14 10*3/MM3 (ref 0–0.2)
BASOPHILS NFR BLD AUTO: 2 % (ref 0–1.5)
BDY SITE: ABNORMAL
BDY SITE: ABNORMAL
BUN BLD-MCNC: 12 MG/DL (ref 8–23)
BUN BLD-MCNC: 14 MG/DL (ref 8–23)
BUN/CREAT SERPL: 12.5 (ref 7–25)
BUN/CREAT SERPL: 12.6 (ref 7–25)
CALCIUM SPEC-SCNC: 8.5 MG/DL (ref 8.6–10.5)
CALCIUM SPEC-SCNC: 9.2 MG/DL (ref 8.6–10.5)
CHLORIDE SERPL-SCNC: 91 MMOL/L (ref 98–107)
CHLORIDE SERPL-SCNC: 94 MMOL/L (ref 98–107)
CO2 BLDA-SCNC: 36 MMOL/L (ref 24–29)
CO2 BLDA-SCNC: 37 MMOL/L (ref 24–29)
CO2 BLDA-SCNC: 38 MMOL/L (ref 24–29)
CO2 BLDA-SCNC: 43 MMOL/L (ref 24–29)
CO2 SERPL-SCNC: 26.9 MMOL/L (ref 22–29)
CO2 SERPL-SCNC: 28.8 MMOL/L (ref 22–29)
CREAT BLD-MCNC: 0.95 MG/DL (ref 0.76–1.27)
CREAT BLD-MCNC: 1.12 MG/DL (ref 0.76–1.27)
DEPRECATED RDW RBC AUTO: 38.4 FL (ref 37–54)
EOSINOPHIL # BLD MANUAL: 0.28 10*3/MM3 (ref 0–0.4)
EOSINOPHIL NFR BLD MANUAL: 4 % (ref 0.3–6.2)
ERYTHROCYTE [DISTWIDTH] IN BLOOD BY AUTOMATED COUNT: 12.7 % (ref 12.3–15.4)
FIBRINOGEN PPP-MCNC: 437 MG/DL (ref 219–464)
GFR SERPL CREATININE-BSD FRML MDRD: 65 ML/MIN/1.73
GFR SERPL CREATININE-BSD FRML MDRD: 79 ML/MIN/1.73
GLUCOSE BLD-MCNC: 174 MG/DL (ref 65–99)
GLUCOSE BLD-MCNC: 221 MG/DL (ref 65–99)
GLUCOSE BLDC GLUCOMTR-MCNC: 134 MG/DL (ref 70–130)
GLUCOSE BLDC GLUCOMTR-MCNC: 145 MG/DL (ref 70–130)
GLUCOSE BLDC GLUCOMTR-MCNC: 171 MG/DL (ref 70–130)
GLUCOSE BLDC GLUCOMTR-MCNC: 175 MG/DL (ref 70–130)
GLUCOSE BLDC GLUCOMTR-MCNC: 188 MG/DL (ref 70–130)
GLUCOSE BLDC GLUCOMTR-MCNC: 188 MG/DL (ref 70–130)
GLUCOSE BLDC GLUCOMTR-MCNC: 209 MG/DL (ref 70–130)
GLUCOSE BLDC GLUCOMTR-MCNC: 225 MG/DL (ref 70–130)
GLUCOSE BLDC GLUCOMTR-MCNC: 230 MG/DL (ref 70–130)
GLUCOSE BLDC GLUCOMTR-MCNC: 244 MG/DL (ref 70–130)
HCO3 BLDA-SCNC: 32.9 MMOL/L (ref 22–28)
HCO3 BLDA-SCNC: 33.1 MMOL/L (ref 22–28)
HCO3 BLDA-SCNC: 34.5 MMOL/L (ref 22–26)
HCO3 BLDA-SCNC: 35.5 MMOL/L (ref 22–26)
HCO3 BLDA-SCNC: 36.3 MMOL/L (ref 22–26)
HCO3 BLDA-SCNC: 40.5 MMOL/L (ref 22–26)
HCT VFR BLD AUTO: 37.2 % (ref 37.5–51)
HCT VFR BLDA CALC: 39 % (ref 38–51)
HCT VFR BLDA CALC: 39 % (ref 38–51)
HCT VFR BLDA CALC: 40 % (ref 38–51)
HCT VFR BLDA CALC: 42 % (ref 38–51)
HGB BLD-MCNC: 12.4 G/DL (ref 13–17.7)
HGB BLDA-MCNC: 13.3 G/DL (ref 12–17)
HGB BLDA-MCNC: 13.3 G/DL (ref 12–17)
HGB BLDA-MCNC: 13.6 G/DL (ref 12–17)
HGB BLDA-MCNC: 14.3 G/DL (ref 12–17)
HOROWITZ INDEX BLD+IHG-RTO: 100 %
INR PPP: 1.2 (ref 0.9–1.1)
LYMPHOCYTES # BLD MANUAL: 0.76 10*3/MM3 (ref 0.7–3.1)
LYMPHOCYTES NFR BLD MANUAL: 11 % (ref 19.6–45.3)
LYMPHOCYTES NFR BLD MANUAL: 9 % (ref 5–12)
MAGNESIUM SERPL-MCNC: 1.9 MG/DL (ref 1.6–2.4)
MCH RBC QN AUTO: 28 PG (ref 26.6–33)
MCHC RBC AUTO-ENTMCNC: 33.3 G/DL (ref 31.5–35.7)
MCV RBC AUTO: 84 FL (ref 79–97)
MODALITY: ABNORMAL
MODALITY: ABNORMAL
MONOCYTES # BLD AUTO: 0.62 10*3/MM3 (ref 0.1–0.9)
NEUTROPHILS # BLD AUTO: 5.1 10*3/MM3 (ref 1.7–7)
NEUTROPHILS NFR BLD MANUAL: 74 % (ref 42.7–76)
O2 A-A PPRESDIFF RESPIRATORY: 0.3 MMHG
PCO2 BLDA: 38.7 MM HG (ref 35–45)
PCO2 BLDA: 51.6 MM HG (ref 35–45)
PCO2 BLDA: 55.2 MM HG (ref 35–45)
PCO2 BLDA: 57.8 MM HG (ref 35–45)
PCO2 BLDA: 64.9 MM HG (ref 35–45)
PCO2 BLDA: 76.5 MM HG (ref 35–45)
PEEP RESPIRATORY: 7.5 CM[H2O]
PH BLDA: 7.33 PH UNITS (ref 7.35–7.6)
PH BLDA: 7.36 PH UNITS (ref 7.35–7.6)
PH BLDA: 7.37 PH UNITS (ref 7.35–7.45)
PH BLDA: 7.4 PH UNITS (ref 7.35–7.6)
PH BLDA: 7.45 PH UNITS (ref 7.35–7.6)
PH BLDA: 7.54 PH UNITS (ref 7.35–7.45)
PHOSPHATE SERPL-MCNC: 2.4 MG/DL (ref 2.5–4.5)
PLAT MORPH BLD: NORMAL
PLATELET # BLD AUTO: 186 10*3/MM3 (ref 140–450)
PMV BLD AUTO: 9.5 FL (ref 6–12)
PO2 BLDA: 122 MMHG (ref 80–105)
PO2 BLDA: 172 MM HG (ref 80–100)
PO2 BLDA: 181 MMHG (ref 80–105)
PO2 BLDA: 58.5 MM HG (ref 80–100)
PO2 BLDA: 79 MMHG (ref 80–105)
PO2 BLDA: 94 MMHG (ref 80–105)
POTASSIUM BLD-SCNC: 4.2 MMOL/L (ref 3.5–5.2)
POTASSIUM BLD-SCNC: 4.6 MMOL/L (ref 3.5–5.2)
POTASSIUM BLDA-SCNC: 3.8 MMOL/L (ref 3.5–4.9)
POTASSIUM BLDA-SCNC: 4.2 MMOL/L (ref 3.5–4.9)
POTASSIUM BLDA-SCNC: 4.2 MMOL/L (ref 3.5–4.9)
POTASSIUM BLDA-SCNC: 4.3 MMOL/L (ref 3.5–4.9)
PROTHROMBIN TIME: 15 SECONDS (ref 11.7–14.2)
RBC # BLD AUTO: 4.43 10*6/MM3 (ref 4.14–5.8)
SAO2 % BLDA: 100 % (ref 95–98)
SAO2 % BLDA: 94 % (ref 95–98)
SAO2 % BLDA: 97 % (ref 95–98)
SAO2 % BLDA: 99 % (ref 95–98)
SAO2 % BLDCOA: 92.9 % (ref 92–99)
SAO2 % BLDCOA: 99.5 % (ref 92–99)
SET MECH RESP RATE: 16
SODIUM BLD-SCNC: 135 MMOL/L (ref 136–145)
SODIUM BLD-SCNC: 137 MMOL/L (ref 136–145)
TOTAL RATE: 16 BREATHS/MINUTE
VENTILATOR MODE: ABNORMAL
VENTILATOR MODE: ABNORMAL
VT ON VENT VENT: 600 ML
WBC MORPH BLD: NORMAL
WBC NRBC COR # BLD: 6.89 10*3/MM3 (ref 3.4–10.8)

## 2020-03-17 PROCEDURE — 82962 GLUCOSE BLOOD TEST: CPT

## 2020-03-17 PROCEDURE — 85730 THROMBOPLASTIN TIME PARTIAL: CPT | Performed by: THORACIC SURGERY (CARDIOTHORACIC VASCULAR SURGERY)

## 2020-03-17 PROCEDURE — C1894 INTRO/SHEATH, NON-LASER: HCPCS | Performed by: THORACIC SURGERY (CARDIOTHORACIC VASCULAR SURGERY)

## 2020-03-17 PROCEDURE — 93005 ELECTROCARDIOGRAM TRACING: CPT | Performed by: INTERNAL MEDICINE

## 2020-03-17 PROCEDURE — 71045 X-RAY EXAM CHEST 1 VIEW: CPT

## 2020-03-17 PROCEDURE — 80048 BASIC METABOLIC PNL TOTAL CA: CPT | Performed by: THORACIC SURGERY (CARDIOTHORACIC VASCULAR SURGERY)

## 2020-03-17 PROCEDURE — C1769 GUIDE WIRE: HCPCS | Performed by: THORACIC SURGERY (CARDIOTHORACIC VASCULAR SURGERY)

## 2020-03-17 PROCEDURE — 85007 BL SMEAR W/DIFF WBC COUNT: CPT | Performed by: THORACIC SURGERY (CARDIOTHORACIC VASCULAR SURGERY)

## 2020-03-17 PROCEDURE — 94799 UNLISTED PULMONARY SVC/PX: CPT

## 2020-03-17 PROCEDURE — B41DYZZ FLUOROSCOPY OF AORTA AND BILATERAL LOWER EXTREMITY ARTERIES USING OTHER CONTRAST: ICD-10-PCS | Performed by: INTERNAL MEDICINE

## 2020-03-17 PROCEDURE — 80069 RENAL FUNCTION PANEL: CPT | Performed by: THORACIC SURGERY (CARDIOTHORACIC VASCULAR SURGERY)

## 2020-03-17 PROCEDURE — 25010000002 ONDANSETRON PER 1 MG: Performed by: ANESTHESIOLOGY

## 2020-03-17 PROCEDURE — 25010000002 FENTANYL CITRATE (PF) 100 MCG/2ML SOLUTION: Performed by: ANESTHESIOLOGY

## 2020-03-17 PROCEDURE — 25010000002 VANCOMYCIN: Performed by: PHYSICIAN ASSISTANT

## 2020-03-17 PROCEDURE — 25010000002 MORPHINE PER 10 MG: Performed by: THORACIC SURGERY (CARDIOTHORACIC VASCULAR SURGERY)

## 2020-03-17 PROCEDURE — C1760 CLOSURE DEV, VASC: HCPCS | Performed by: THORACIC SURGERY (CARDIOTHORACIC VASCULAR SURGERY)

## 2020-03-17 PROCEDURE — 25010000002 EPINEPHRINE PER 0.1 MG: Performed by: ANESTHESIOLOGY

## 2020-03-17 PROCEDURE — 25010000002 PROPOFOL 10 MG/ML EMULSION: Performed by: ANESTHESIOLOGY

## 2020-03-17 PROCEDURE — 25010000002 HEPARIN (PORCINE) PER 1000 UNITS: Performed by: ANESTHESIOLOGY

## 2020-03-17 PROCEDURE — 85018 HEMOGLOBIN: CPT

## 2020-03-17 PROCEDURE — 85610 PROTHROMBIN TIME: CPT | Performed by: THORACIC SURGERY (CARDIOTHORACIC VASCULAR SURGERY)

## 2020-03-17 PROCEDURE — 25010000002 MAGNESIUM SULFATE PER 500 MG OF MAGNESIUM: Performed by: ANESTHESIOLOGY

## 2020-03-17 PROCEDURE — 85384 FIBRINOGEN ACTIVITY: CPT | Performed by: THORACIC SURGERY (CARDIOTHORACIC VASCULAR SURGERY)

## 2020-03-17 PROCEDURE — 33361 REPLACE AORTIC VALVE PERQ: CPT | Performed by: THORACIC SURGERY (CARDIOTHORACIC VASCULAR SURGERY)

## 2020-03-17 PROCEDURE — 25010000002 PROPOFOL 10 MG/ML EMULSION: Performed by: THORACIC SURGERY (CARDIOTHORACIC VASCULAR SURGERY)

## 2020-03-17 PROCEDURE — 25010000002 MORPHINE PER 10 MG

## 2020-03-17 PROCEDURE — 82803 BLOOD GASES ANY COMBINATION: CPT

## 2020-03-17 PROCEDURE — 25010000002 PROTAMINE SULFATE PER 10 MG: Performed by: THORACIC SURGERY (CARDIOTHORACIC VASCULAR SURGERY)

## 2020-03-17 PROCEDURE — 25010000003 LIDOCAINE 1 % SOLUTION

## 2020-03-17 PROCEDURE — 94640 AIRWAY INHALATION TREATMENT: CPT

## 2020-03-17 PROCEDURE — C1751 CATH, INF, PER/CENT/MIDLINE: HCPCS | Performed by: ANESTHESIOLOGY

## 2020-03-17 PROCEDURE — 85014 HEMATOCRIT: CPT

## 2020-03-17 PROCEDURE — 25010000002 PROTAMINE SULFATE PER 10 MG: Performed by: ANESTHESIOLOGY

## 2020-03-17 PROCEDURE — 33361 REPLACE AORTIC VALVE PERQ: CPT | Performed by: INTERNAL MEDICINE

## 2020-03-17 PROCEDURE — 83735 ASSAY OF MAGNESIUM: CPT | Performed by: THORACIC SURGERY (CARDIOTHORACIC VASCULAR SURGERY)

## 2020-03-17 PROCEDURE — 25010000003 INSULIN REGULAR HUMAN PER 5 UNITS: Performed by: THORACIC SURGERY (CARDIOTHORACIC VASCULAR SURGERY)

## 2020-03-17 PROCEDURE — 25010000002 SUCCINYLCHOLINE PER 20 MG: Performed by: ANESTHESIOLOGY

## 2020-03-17 PROCEDURE — 82947 ASSAY GLUCOSE BLOOD QUANT: CPT

## 2020-03-17 PROCEDURE — 85025 COMPLETE CBC W/AUTO DIFF WBC: CPT | Performed by: THORACIC SURGERY (CARDIOTHORACIC VASCULAR SURGERY)

## 2020-03-17 PROCEDURE — 25010000002 HYDRALAZINE PER 20 MG: Performed by: INTERNAL MEDICINE

## 2020-03-17 PROCEDURE — 93010 ELECTROCARDIOGRAM REPORT: CPT | Performed by: INTERNAL MEDICINE

## 2020-03-17 PROCEDURE — 25010000002 MIDAZOLAM PER 1 MG: Performed by: ANESTHESIOLOGY

## 2020-03-17 PROCEDURE — 02RF38Z REPLACEMENT OF AORTIC VALVE WITH ZOOPLASTIC TISSUE, PERCUTANEOUS APPROACH: ICD-10-PCS | Performed by: THORACIC SURGERY (CARDIOTHORACIC VASCULAR SURGERY)

## 2020-03-17 PROCEDURE — 85347 COAGULATION TIME ACTIVATED: CPT

## 2020-03-17 PROCEDURE — 94002 VENT MGMT INPAT INIT DAY: CPT

## 2020-03-17 PROCEDURE — B24BZZ4 ULTRASONOGRAPHY OF HEART WITH AORTA, TRANSESOPHAGEAL: ICD-10-PCS | Performed by: ANESTHESIOLOGY

## 2020-03-17 PROCEDURE — 0 IOPAMIDOL PER 1 ML: Performed by: THORACIC SURGERY (CARDIOTHORACIC VASCULAR SURGERY)

## 2020-03-17 PROCEDURE — 93355 ECHO TRANSESOPHAGEAL (TEE): CPT | Performed by: ANESTHESIOLOGY

## 2020-03-17 PROCEDURE — 25010000002 METHYLPREDNISOLONE PER 40 MG: Performed by: INTERNAL MEDICINE

## 2020-03-17 DEVICE — VLV HEART TRNSCATH SAPIEN3 26MM: Type: IMPLANTABLE DEVICE | Site: HEART | Status: FUNCTIONAL

## 2020-03-17 RX ORDER — FUROSEMIDE 40 MG/1
40 TABLET ORAL DAILY
Status: DISCONTINUED | OUTPATIENT
Start: 2020-03-18 | End: 2020-03-18

## 2020-03-17 RX ORDER — LIDOCAINE HYDROCHLORIDE 10 MG/ML
10 INJECTION, SOLUTION INFILTRATION; PERINEURAL ONCE
Status: COMPLETED | OUTPATIENT
Start: 2020-03-17 | End: 2020-03-17

## 2020-03-17 RX ORDER — ASPIRIN 81 MG/1
81 TABLET ORAL DAILY
Status: DISCONTINUED | OUTPATIENT
Start: 2020-03-18 | End: 2020-03-18

## 2020-03-17 RX ORDER — FENTANYL CITRATE 50 UG/ML
50 INJECTION, SOLUTION INTRAMUSCULAR; INTRAVENOUS
Status: DISCONTINUED | OUTPATIENT
Start: 2020-03-17 | End: 2020-03-17 | Stop reason: HOSPADM

## 2020-03-17 RX ORDER — GLYCOPYRROLATE 0.2 MG/ML
INJECTION INTRAMUSCULAR; INTRAVENOUS AS NEEDED
Status: DISCONTINUED | OUTPATIENT
Start: 2020-03-17 | End: 2020-03-17 | Stop reason: SURG

## 2020-03-17 RX ORDER — BUDESONIDE AND FORMOTEROL FUMARATE DIHYDRATE 160; 4.5 UG/1; UG/1
2 AEROSOL RESPIRATORY (INHALATION)
Status: DISCONTINUED | OUTPATIENT
Start: 2020-03-17 | End: 2020-03-20 | Stop reason: HOSPADM

## 2020-03-17 RX ORDER — FAMOTIDINE 10 MG/ML
20 INJECTION, SOLUTION INTRAVENOUS ONCE
Status: COMPLETED | OUTPATIENT
Start: 2020-03-17 | End: 2020-03-17

## 2020-03-17 RX ORDER — IPRATROPIUM BROMIDE AND ALBUTEROL SULFATE 2.5; .5 MG/3ML; MG/3ML
3 SOLUTION RESPIRATORY (INHALATION)
Status: DISCONTINUED | OUTPATIENT
Start: 2020-03-17 | End: 2020-03-17 | Stop reason: HOSPADM

## 2020-03-17 RX ORDER — CLOPIDOGREL BISULFATE 75 MG/1
75 TABLET ORAL DAILY
Status: DISCONTINUED | OUTPATIENT
Start: 2020-03-18 | End: 2020-03-18

## 2020-03-17 RX ORDER — ROPINIROLE 2 MG/1
4 TABLET, FILM COATED ORAL NIGHTLY
Status: DISCONTINUED | OUTPATIENT
Start: 2020-03-17 | End: 2020-03-20 | Stop reason: HOSPADM

## 2020-03-17 RX ORDER — SODIUM CHLORIDE 0.9 % (FLUSH) 0.9 %
30 SYRINGE (ML) INJECTION ONCE AS NEEDED
Status: DISCONTINUED | OUTPATIENT
Start: 2020-03-17 | End: 2020-03-20 | Stop reason: HOSPADM

## 2020-03-17 RX ORDER — MIDAZOLAM HYDROCHLORIDE 1 MG/ML
2 INJECTION INTRAMUSCULAR; INTRAVENOUS
Status: DISCONTINUED | OUTPATIENT
Start: 2020-03-17 | End: 2020-03-17 | Stop reason: HOSPADM

## 2020-03-17 RX ORDER — MIDAZOLAM HYDROCHLORIDE 1 MG/ML
1 INJECTION INTRAMUSCULAR; INTRAVENOUS
Status: DISCONTINUED | OUTPATIENT
Start: 2020-03-17 | End: 2020-03-17 | Stop reason: HOSPADM

## 2020-03-17 RX ORDER — LIDOCAINE HYDROCHLORIDE 10 MG/ML
INJECTION, SOLUTION INFILTRATION; PERINEURAL
Status: COMPLETED
Start: 2020-03-17 | End: 2020-03-17

## 2020-03-17 RX ORDER — ALBUTEROL SULFATE 1.25 MG/3ML
1 SOLUTION RESPIRATORY (INHALATION) 4 TIMES DAILY
Status: DISCONTINUED | OUTPATIENT
Start: 2020-03-17 | End: 2020-03-17

## 2020-03-17 RX ORDER — HEPARIN SODIUM 1000 [USP'U]/ML
INJECTION, SOLUTION INTRAVENOUS; SUBCUTANEOUS AS NEEDED
Status: DISCONTINUED | OUTPATIENT
Start: 2020-03-17 | End: 2020-03-17 | Stop reason: SURG

## 2020-03-17 RX ORDER — SUCCINYLCHOLINE CHLORIDE 20 MG/ML
INJECTION INTRAMUSCULAR; INTRAVENOUS AS NEEDED
Status: DISCONTINUED | OUTPATIENT
Start: 2020-03-17 | End: 2020-03-17 | Stop reason: SURG

## 2020-03-17 RX ORDER — LIDOCAINE HYDROCHLORIDE 20 MG/ML
INJECTION, SOLUTION INFILTRATION; PERINEURAL AS NEEDED
Status: DISCONTINUED | OUTPATIENT
Start: 2020-03-17 | End: 2020-03-17 | Stop reason: HOSPADM

## 2020-03-17 RX ORDER — KETAMINE HYDROCHLORIDE 10 MG/ML
INJECTION INTRAMUSCULAR; INTRAVENOUS AS NEEDED
Status: DISCONTINUED | OUTPATIENT
Start: 2020-03-17 | End: 2020-03-17 | Stop reason: SURG

## 2020-03-17 RX ORDER — SODIUM CHLORIDE 0.9 % (FLUSH) 0.9 %
3 SYRINGE (ML) INJECTION EVERY 12 HOURS SCHEDULED
Status: DISCONTINUED | OUTPATIENT
Start: 2020-03-17 | End: 2020-03-17 | Stop reason: HOSPADM

## 2020-03-17 RX ORDER — SODIUM CHLORIDE 9 MG/ML
30 INJECTION, SOLUTION INTRAVENOUS CONTINUOUS PRN
Status: DISCONTINUED | OUTPATIENT
Start: 2020-03-17 | End: 2020-03-20 | Stop reason: HOSPADM

## 2020-03-17 RX ORDER — DEXTROSE MONOHYDRATE 25 G/50ML
25-50 INJECTION, SOLUTION INTRAVENOUS
Status: DISCONTINUED | OUTPATIENT
Start: 2020-03-17 | End: 2020-03-20 | Stop reason: HOSPADM

## 2020-03-17 RX ORDER — PROTAMINE SULFATE 10 MG/ML
12.5 INJECTION, SOLUTION INTRAVENOUS ONCE
Status: COMPLETED | OUTPATIENT
Start: 2020-03-17 | End: 2020-03-17

## 2020-03-17 RX ORDER — AMLODIPINE BESYLATE 5 MG/1
5 TABLET ORAL DAILY
Status: DISCONTINUED | OUTPATIENT
Start: 2020-03-18 | End: 2020-03-18

## 2020-03-17 RX ORDER — SODIUM CHLORIDE 0.9 % (FLUSH) 0.9 %
3-10 SYRINGE (ML) INJECTION AS NEEDED
Status: DISCONTINUED | OUTPATIENT
Start: 2020-03-17 | End: 2020-03-17 | Stop reason: HOSPADM

## 2020-03-17 RX ORDER — NOREPINEPHRINE BITARTRATE 1 MG/ML
INJECTION, SOLUTION INTRAVENOUS CONTINUOUS PRN
Status: DISCONTINUED | OUTPATIENT
Start: 2020-03-17 | End: 2020-03-17 | Stop reason: SURG

## 2020-03-17 RX ORDER — ALBUTEROL SULFATE 90 UG/1
6 AEROSOL, METERED RESPIRATORY (INHALATION) EVERY 4 HOURS PRN
Status: DISCONTINUED | OUTPATIENT
Start: 2020-03-17 | End: 2020-03-18

## 2020-03-17 RX ORDER — CHLORHEXIDINE GLUCONATE 0.12 MG/ML
15 RINSE ORAL ONCE
Status: DISCONTINUED | OUTPATIENT
Start: 2020-03-17 | End: 2020-03-17 | Stop reason: HOSPADM

## 2020-03-17 RX ORDER — SODIUM CHLORIDE, SODIUM LACTATE, POTASSIUM CHLORIDE, CALCIUM CHLORIDE 600; 310; 30; 20 MG/100ML; MG/100ML; MG/100ML; MG/100ML
9 INJECTION, SOLUTION INTRAVENOUS CONTINUOUS
Status: DISCONTINUED | OUTPATIENT
Start: 2020-03-17 | End: 2020-03-18

## 2020-03-17 RX ORDER — ATORVASTATIN CALCIUM 80 MG/1
80 TABLET, FILM COATED ORAL NIGHTLY
Status: DISCONTINUED | OUTPATIENT
Start: 2020-03-17 | End: 2020-03-20 | Stop reason: HOSPADM

## 2020-03-17 RX ORDER — MORPHINE SULFATE 2 MG/ML
INJECTION, SOLUTION INTRAMUSCULAR; INTRAVENOUS
Status: DISPENSED
Start: 2020-03-17 | End: 2020-03-18

## 2020-03-17 RX ORDER — PROPOFOL 10 MG/ML
VIAL (ML) INTRAVENOUS AS NEEDED
Status: DISCONTINUED | OUTPATIENT
Start: 2020-03-17 | End: 2020-03-17 | Stop reason: SURG

## 2020-03-17 RX ORDER — PROPOFOL 10 MG/ML
VIAL (ML) INTRAVENOUS CONTINUOUS PRN
Status: DISCONTINUED | OUTPATIENT
Start: 2020-03-17 | End: 2020-03-17 | Stop reason: SURG

## 2020-03-17 RX ORDER — FENTANYL CITRATE 50 UG/ML
INJECTION, SOLUTION INTRAMUSCULAR; INTRAVENOUS AS NEEDED
Status: DISCONTINUED | OUTPATIENT
Start: 2020-03-17 | End: 2020-03-17 | Stop reason: SURG

## 2020-03-17 RX ORDER — MORPHINE SULFATE 2 MG/ML
INJECTION, SOLUTION INTRAMUSCULAR; INTRAVENOUS
Status: COMPLETED
Start: 2020-03-17 | End: 2020-03-17

## 2020-03-17 RX ORDER — ALBUTEROL SULFATE 90 UG/1
6 AEROSOL, METERED RESPIRATORY (INHALATION)
Status: DISCONTINUED | OUTPATIENT
Start: 2020-03-17 | End: 2020-03-18

## 2020-03-17 RX ORDER — METHYLPREDNISOLONE SODIUM SUCCINATE 40 MG/ML
40 INJECTION, POWDER, LYOPHILIZED, FOR SOLUTION INTRAMUSCULAR; INTRAVENOUS EVERY 12 HOURS
Status: DISCONTINUED | OUTPATIENT
Start: 2020-03-17 | End: 2020-03-20 | Stop reason: HOSPADM

## 2020-03-17 RX ORDER — LIDOCAINE HYDROCHLORIDE 10 MG/ML
0.5 INJECTION, SOLUTION EPIDURAL; INFILTRATION; INTRACAUDAL; PERINEURAL ONCE AS NEEDED
Status: DISCONTINUED | OUTPATIENT
Start: 2020-03-17 | End: 2020-03-17 | Stop reason: HOSPADM

## 2020-03-17 RX ORDER — HYDRALAZINE HYDROCHLORIDE 20 MG/ML
10 INJECTION INTRAMUSCULAR; INTRAVENOUS EVERY 6 HOURS PRN
Status: DISCONTINUED | OUTPATIENT
Start: 2020-03-17 | End: 2020-03-20 | Stop reason: HOSPADM

## 2020-03-17 RX ORDER — PROTAMINE SULFATE 10 MG/ML
INJECTION, SOLUTION INTRAVENOUS AS NEEDED
Status: DISCONTINUED | OUTPATIENT
Start: 2020-03-17 | End: 2020-03-17 | Stop reason: SURG

## 2020-03-17 RX ORDER — LISINOPRIL 20 MG/1
40 TABLET ORAL DAILY
Status: DISCONTINUED | OUTPATIENT
Start: 2020-03-18 | End: 2020-03-18

## 2020-03-17 RX ORDER — ALBUTEROL SULFATE 2.5 MG/3ML
2.5 SOLUTION RESPIRATORY (INHALATION) EVERY 4 HOURS PRN
Status: DISCONTINUED | OUTPATIENT
Start: 2020-03-17 | End: 2020-03-20 | Stop reason: HOSPADM

## 2020-03-17 RX ORDER — MORPHINE SULFATE 2 MG/ML
2 INJECTION, SOLUTION INTRAMUSCULAR; INTRAVENOUS ONCE
Status: COMPLETED | OUTPATIENT
Start: 2020-03-17 | End: 2020-03-17

## 2020-03-17 RX ORDER — ACETAMINOPHEN 325 MG/1
650 TABLET ORAL EVERY 4 HOURS PRN
Status: DISCONTINUED | OUTPATIENT
Start: 2020-03-17 | End: 2020-03-20 | Stop reason: HOSPADM

## 2020-03-17 RX ORDER — ONDANSETRON 2 MG/ML
INJECTION INTRAMUSCULAR; INTRAVENOUS AS NEEDED
Status: DISCONTINUED | OUTPATIENT
Start: 2020-03-17 | End: 2020-03-17 | Stop reason: SURG

## 2020-03-17 RX ORDER — MORPHINE SULFATE 2 MG/ML
1 INJECTION, SOLUTION INTRAMUSCULAR; INTRAVENOUS
Status: DISCONTINUED | OUTPATIENT
Start: 2020-03-17 | End: 2020-03-20 | Stop reason: HOSPADM

## 2020-03-17 RX ORDER — SODIUM CHLORIDE 9 MG/ML
50 INJECTION, SOLUTION INTRAVENOUS CONTINUOUS
Status: DISCONTINUED | OUTPATIENT
Start: 2020-03-17 | End: 2020-03-18

## 2020-03-17 RX ORDER — ALBUTEROL SULFATE 0.63 MG/3ML
0.63 SOLUTION RESPIRATORY (INHALATION) EVERY 4 HOURS PRN
Status: DISCONTINUED | OUTPATIENT
Start: 2020-03-17 | End: 2020-03-17

## 2020-03-17 RX ORDER — MAGNESIUM SULFATE HEPTAHYDRATE 500 MG/ML
INJECTION, SOLUTION INTRAMUSCULAR; INTRAVENOUS AS NEEDED
Status: DISCONTINUED | OUTPATIENT
Start: 2020-03-17 | End: 2020-03-17 | Stop reason: SURG

## 2020-03-17 RX ADMIN — KETAMINE HYDROCHLORIDE 10 MG: 10 INJECTION INTRAMUSCULAR; INTRAVENOUS at 08:09

## 2020-03-17 RX ADMIN — DEXMEDETOMIDINE HYDROCHLORIDE 0.8 MCG/KG/HR: 100 INJECTION, SOLUTION, CONCENTRATE INTRAVENOUS at 21:59

## 2020-03-17 RX ADMIN — MORPHINE SULFATE 2 MG: 2 INJECTION, SOLUTION INTRAMUSCULAR; INTRAVENOUS at 11:36

## 2020-03-17 RX ADMIN — MORPHINE SULFATE 2 MG: 2 INJECTION, SOLUTION INTRAMUSCULAR; INTRAVENOUS at 23:01

## 2020-03-17 RX ADMIN — MAGNESIUM SULFATE HEPTAHYDRATE 2 G: 500 INJECTION, SOLUTION INTRAMUSCULAR; INTRAVENOUS at 08:40

## 2020-03-17 RX ADMIN — SODIUM CHLORIDE 100 ML/HR: 9 INJECTION, SOLUTION INTRAVENOUS at 10:00

## 2020-03-17 RX ADMIN — VANCOMYCIN HYDROCHLORIDE 1500 MG: 10 INJECTION, POWDER, LYOPHILIZED, FOR SOLUTION INTRAVENOUS at 07:32

## 2020-03-17 RX ADMIN — SODIUM CHLORIDE 1 MCG/KG/HR: 900 INJECTION, SOLUTION INTRAVENOUS at 07:53

## 2020-03-17 RX ADMIN — ALBUTEROL SULFATE 6 PUFF: 90 AEROSOL, METERED RESPIRATORY (INHALATION) at 11:20

## 2020-03-17 RX ADMIN — ALBUTEROL SULFATE 6 PUFF: 90 AEROSOL, METERED RESPIRATORY (INHALATION) at 15:18

## 2020-03-17 RX ADMIN — MORPHINE SULFATE 2 MG: 2 INJECTION, SOLUTION INTRAMUSCULAR; INTRAVENOUS at 20:26

## 2020-03-17 RX ADMIN — PROPOFOL 50 MCG/KG/MIN: 10 INJECTION, EMULSION INTRAVENOUS at 12:41

## 2020-03-17 RX ADMIN — HEPARIN SODIUM 15000 UNITS: 1000 INJECTION, SOLUTION INTRAVENOUS; SUBCUTANEOUS at 08:52

## 2020-03-17 RX ADMIN — GLYCOPYRROLATE 0.2 MG: 0.2 INJECTION INTRAMUSCULAR; INTRAVENOUS at 07:49

## 2020-03-17 RX ADMIN — HYDRALAZINE HYDROCHLORIDE 10 MG: 20 INJECTION INTRAMUSCULAR; INTRAVENOUS at 16:59

## 2020-03-17 RX ADMIN — PROTAMINE SULFATE 12.5 MG: 10 INJECTION, SOLUTION INTRAVENOUS at 12:18

## 2020-03-17 RX ADMIN — PROPOFOL 50 MCG/KG/MIN: 10 INJECTION, EMULSION INTRAVENOUS at 07:53

## 2020-03-17 RX ADMIN — PROPOFOL 100 MG: 10 INJECTION, EMULSION INTRAVENOUS at 08:12

## 2020-03-17 RX ADMIN — MIDAZOLAM 2 MG: 1 INJECTION INTRAMUSCULAR; INTRAVENOUS at 07:28

## 2020-03-17 RX ADMIN — SODIUM CHLORIDE 3.4 UNITS/HR: 9 INJECTION, SOLUTION INTRAVENOUS at 20:54

## 2020-03-17 RX ADMIN — DEXMEDETOMIDINE HYDROCHLORIDE 1 MCG/KG/HR: 100 INJECTION, SOLUTION, CONCENTRATE INTRAVENOUS at 12:41

## 2020-03-17 RX ADMIN — PROTAMINE SULFATE 25 MG: 10 INJECTION, SOLUTION INTRAVENOUS at 09:15

## 2020-03-17 RX ADMIN — DEXMEDETOMIDINE HYDROCHLORIDE 1 MCG/KG/HR: 100 INJECTION, SOLUTION, CONCENTRATE INTRAVENOUS at 16:17

## 2020-03-17 RX ADMIN — IPRATROPIUM BROMIDE AND ALBUTEROL SULFATE 3 ML: 2.5; .5 SOLUTION RESPIRATORY (INHALATION) at 07:05

## 2020-03-17 RX ADMIN — FAMOTIDINE 20 MG: 10 INJECTION INTRAVENOUS at 06:20

## 2020-03-17 RX ADMIN — EPINEPHRINE 0.03 MCG/KG/MIN: 1 INJECTION, SOLUTION, CONCENTRATE INTRAVENOUS at 08:30

## 2020-03-17 RX ADMIN — ONDANSETRON HYDROCHLORIDE 4 MG: 2 SOLUTION INTRAMUSCULAR; INTRAVENOUS at 07:43

## 2020-03-17 RX ADMIN — KETAMINE HYDROCHLORIDE 10 MG: 10 INJECTION INTRAMUSCULAR; INTRAVENOUS at 08:39

## 2020-03-17 RX ADMIN — ALBUTEROL SULFATE 6 PUFF: 90 AEROSOL, METERED RESPIRATORY (INHALATION) at 18:56

## 2020-03-17 RX ADMIN — LIDOCAINE HYDROCHLORIDE 10 ML: 10 INJECTION, SOLUTION INFILTRATION; PERINEURAL at 12:40

## 2020-03-17 RX ADMIN — KETAMINE HYDROCHLORIDE 20 MG: 10 INJECTION INTRAMUSCULAR; INTRAVENOUS at 08:23

## 2020-03-17 RX ADMIN — NOREPINEPHRINE BITARTRATE 0.02 MCG/KG/MIN: 1 INJECTION, SOLUTION, CONCENTRATE INTRAVENOUS at 08:36

## 2020-03-17 RX ADMIN — KETAMINE HYDROCHLORIDE 10 MG: 10 INJECTION INTRAMUSCULAR; INTRAVENOUS at 08:02

## 2020-03-17 RX ADMIN — IPRATROPIUM BROMIDE 6 PUFF: 17 AEROSOL, METERED RESPIRATORY (INHALATION) at 18:56

## 2020-03-17 RX ADMIN — METHYLPREDNISOLONE SODIUM SUCCINATE 40 MG: 40 INJECTION, POWDER, FOR SOLUTION INTRAMUSCULAR; INTRAVENOUS at 15:11

## 2020-03-17 RX ADMIN — NICARDIPINE HYDROCHLORIDE 2.5 MG/HR: 2.5 INJECTION INTRAVENOUS at 20:51

## 2020-03-17 RX ADMIN — PROPOFOL 30 MG: 10 INJECTION, EMULSION INTRAVENOUS at 08:08

## 2020-03-17 RX ADMIN — PROPOFOL 50 MG: 10 INJECTION, EMULSION INTRAVENOUS at 08:28

## 2020-03-17 RX ADMIN — FENTANYL CITRATE 100 MCG: 50 INJECTION INTRAMUSCULAR; INTRAVENOUS at 08:28

## 2020-03-17 RX ADMIN — IPRATROPIUM BROMIDE 6 PUFF: 17 AEROSOL, METERED RESPIRATORY (INHALATION) at 15:18

## 2020-03-17 RX ADMIN — SUCCINYLCHOLINE CHLORIDE 120 MG: 20 INJECTION, SOLUTION INTRAMUSCULAR; INTRAVENOUS; PARENTERAL at 08:13

## 2020-03-17 NOTE — ANESTHESIA PROCEDURE NOTES
Central Line      Patient reassessed immediately prior to procedure    Patient location during procedure: OR  Start time: 3/17/2020 7:42 AM  Stop Time:3/17/2020 7:50 AM  Indications: vascular access and MD/Surgeon request  Staff  Anesthesiologist: Matt Dee MD  Preanesthetic Checklist  Completed: patient identified, site marked, surgical consent, pre-op evaluation, timeout performed, IV checked, risks and benefits discussed and monitors and equipment checked  Central Line Prep  Sterile Tech:cap, gloves, gown, mask and sterile barriers  Prep: chloraprep  Patient monitoring: blood pressure monitoring, continuous pulse oximetry and EKG  Central Line Procedure  Laterality:right  Location:internal jugular  Catheter Type:single lumen  Catheter Size:6 Fr  Guidance:ultrasound guided  PROCEDURE NOTE/ULTRASOUND INTERPRETATION.  Using ultrasound guidance the potential vascular sites for insertion of the catheter were visualized to determine the patency of the vessel to be used for vascular access.  After selecting the appropriate site for insertion, the needle was visualized under ultrasound being inserted into the internal jugular vein, followed by ultrasound confirmation of wire and catheter placement. There were no abnormalities seen on ultrasound; an image was taken; and the patient tolerated the procedure with no complications. Images: still images obtained, printed/placed on chart  Assessment  Post procedure:biopatch applied, line sutured, occlusive dressing applied and secured with tape  Assessement:blood return through all ports, free fluid flow, no pneumothorax on x-ray, placement verified by x-ray, chest x-ray ordered and Shashank Test  Complications:no  Patient Tolerance:patient tolerated the procedure well with no apparent complications  Additional Notes  Ultrasound interpretation note:  Under ultrasound guidance, the available vessels were examined, and the above vessel was deemed patent.  The needle, wire  and catheter were seen entering the vessel.  No abnormalities noted

## 2020-03-17 NOTE — ANESTHESIA PROCEDURE NOTES
Procedure Performed: Emergent/Open-Heart Anesthesia BRIE     Start Time:        End Time:      Preanesthesia Checklist:  Patient identified, IV assessed, risks and benefits discussed, monitors and equipment assessed, procedure being performed at surgeon's request and anesthesia consent obtained.    General Procedure Information  Diagnostic Indications for Echo:  assessment of surgical repair and suspected pericardial effusion  Physician Requesting Echo: Sera Ace MD  Location performed:  OR  Intubated  Bite block placed  Heart visualized  Probe Insertion:  Easy  Probe Type:  Multiplane  Modalities:  Color flow mapping, continuous wave Doppler and pulse wave Doppler    Echocardiographic and Doppler Measurements    Ventricles    Right Ventricle:  Cavity size normal.  Hypertrophy not present.  Thrombus not present.  Global function normal.  Ejection Fraction 40%.    Left Ventricle:  Cavity size normal.  Hypertrophy present.  Thrombus not present.  Global Function normal.  Ejection Fraction 65%.          Valves    Aortic Valve:  Annulus calcified.  Stenosis moderate.  Area: 1.0 cm².  Mean Gradient: 27 mmHg.  Regurgitation moderate.  Leaflets calcified.  Leaflet motions restricted.      Mitral Valve:  Annulus normal.  Stenosis not present.  Regurgitation mild.  Leaflets thickened.  Leaflet motions normal.      Tricuspid Valve:  Annulus normal.  Stenosis not present.  Regurgitation mild.  Leaflets normal.  Leaflet motions normal.          Aorta    Ascending Aorta:  Size normal.  Plaque thickness less than 3 mm.  Mobile plaque not present.    Aortic Arch:  Size normal.  Plaque thickness less than 3 mm.  Mobile plaque not present.    Descending Aorta:  Size normal.  Plaque thickness less than 3 mm.  Mobile plaque not present.          Atria    Right Atrium:  Size normal.  Spontaneous echo contrast not present.  Thrombus not present.  Tumor not present.  Device not present.      Left Atrium:  Size normal.   Spontaneous echo contrast not present.  Thrombus not present.  Tumor not present.  Device not present.    Left atrial appendage normal.      Septa    Atrial Septum:  Intra-atrial septal morphology normal.      Ventricular Septum:  Intra-ventricular septum morphology normal.          Other Findings  Pericardium:  normal  Pulmonary Arteries:  normal      Anesthesia Information  Performed Personally      Echocardiogram Comments:       Pre-TAVR:    Conc LVH, normal LV systolic function, EF 65%  Severely restricted AV leaflets, appears tri leaflet valve, mod AS, MG - 27, DEXTER 1.0 cm2 by continuity, mild to mod AI  Mild MR, mild TR  Normal RV systolic function  Trace Lt pleural effusion    Post-TAVR:  S/p TAVR with #26 Borges Bioprosthetic valve  Valve seated in good position, mild PVL anteriorly directed towards AML, MG -5mm Hg, DEXTER 2.14cm2 by continuity  No change in Mild MR & mild TR  Normal LV & RV systolic function  No pericardial effusion

## 2020-03-17 NOTE — OP NOTE
BCS OPERATIVE NOTE    Date of procedure:3/17/2020    Pre-op Diagnosis: Severe symptomatic aortic valve stenosis, chronic diastolic congestive heart failure New [place] Heart Association grade 4, severe COPD with FEV1 of 31% predicted, active smoker, obstructive sleep apnea, poorly controlled diabetes, peripheral vascular disease with bilateral popliteal stents, obesity.    Post-op Diagnosis: Same.    Procedure: Percutaneous access transfemoral TAVR using 26 mm Sapien3 prosthesis, supravalvar aortograms, temporary transvenous pacemaker placement, intraoperative transesophageal echocardiogram.    Co-surgeon: Sera Ace MD    Co-surgeon: Brandon Montenegro MD.    Anesthesia: GET    Findings: Uneventful deployment of transcatheter prosthesis.  Post deployment transthoracic echocardiogram showed trace aortic valve insufficiency, a mean aortic valve gradient of 5 mmHg, and an aortic valve area of 2.1 cm².  Total IV contrast used 110 mL, total fluoroscopy time 11.5 minutes.    EBL: 20 mL ml    History of present illness: This is a pleasant 69-year-old  gentleman initially referred to us for possible aortic valve replacement and coronary bypass.  Unfortunately, he was found to have severe COPD with near prohibitive pulmonary function spirometry values.  His official STS calculated mortality risk for an AVR CABG was only 3%, but this was felt to not represent his actual mortality risk.  Preoperative work-up included a TAVR protocol CTA; images suggested he would be accessible by the transfemoral route.  He was counseled and consented for a semiurgent TAVR.    Details: The patient was identified in the prep and holding area.  He was taken to the OR.  He received a right IJ catheter and radial arterial line.  Our initial attempt was to perform this procedure using MAC, but became obvious that he was retaining too much CO2 and was too hypoxic; he was therefore intubated uneventfully.  Positioned, and then prepped and  draped.  Access was gained to his right radial artery and this was used to insert pigtail catheter into the right coronary cusp.  A supravalvar aortogram confirmed the adequacy of our intended deployment angle.  The right femoral vein was accessed and used to insert a transvenous temporary pacemaker lead into the right ventricular apex.  Pacing thresholds were adequate.  The left femoral artery was accessed and this artery received 2 Perclose sutures.  The same artery then received, using sequential dilators, a 14 Icelandic E sheath.  The patient was heparinized and ACT was confirmed.  The E sheath was used to insert a soft-tipped guidewire around the aortic arch and into the aortic valve orifice; this was positioned in the left ventricular cavity.  This was exchanged, using a pigtail catheter, for a extra-stiff guidewire.    A 26 mm prosthesis was crimped and prepped.  This was passed onto the field and orientation was confirmed.  This was loaded onto the extra-stiff guidewire and into the descending thoracic aorta.  The deployment balloon was withdrawn into the prosthesis.  Using the active flexion delivery system, the procedure was passed around the aortic arch and into the aortic valve orifice.    Rapid ventricular pacing was employed with good capture.  A supravalvar aortogram confirmed the adequacy of our intended position.  The deployment balloon was inflated and then deflated after approximately 5 seconds.  Ventricular pacing was discontinued and the patient recovered into sinus rhythm.  Transthoracic echocardiogram showed good function of the newly implanted prosthesis.    The deployment system was withdrawn and removed.  The E sheath was withdrawn to the level of the left common femoral artery.  An arteriogram confirmed the absence of any trauma to the aorto iliofemoral system.  The E sheath was removed.  Perclose sutures were tightened.  Another arteriogram confirmed the absence of any compromise of the left  femoral artery.  The patient received a small dose of protamine.  The temporary pacemaker was removed.The pigtail catheter was removed and the access site at the right radial artery was controlled with manual pressure.  The patient remained hemodynamically stable and hemostatic.  He was transferred to the CVR.    Sera Ace MD  3/17/2020  12:38

## 2020-03-17 NOTE — ANESTHESIA PROCEDURE NOTES
Arterial Line      Patient reassessed immediately prior to procedure    Start time: 3/17/2020 7:28 AM  Stop Time:3/17/2020 7:31 AM       Performed By   Anesthesiologist: Matt Dee MD  Preanesthetic Checklist  Completed: patient identified, site marked, surgical consent, pre-op evaluation, timeout performed, IV checked, risks and benefits discussed and monitors and equipment checked  Arterial Line Prep   Sterile Tech: gloves, sterile barriers, mask and cap  Prep: ChloraPrep  Patient monitoring: blood pressure monitoring, continuous pulse oximetry and EKG  Arterial Line Procedure   Laterality:left  Location:  radial artery  Catheter size: 20 G   Guidance: palpation technique  Number of attempts: 1  Successful placement: yes  Post Assessment   Dressing Type: secured with tape, biopatch applied, occlusive dressing applied and wrist guard applied.   Complications no  Circ/Move/Sens Assessment: normal and unchanged.   Patient Tolerance: patient tolerated the procedure well with no apparent complications

## 2020-03-17 NOTE — ANESTHESIA PROCEDURE NOTES
Airway  Urgency: elective    Date/Time: 3/17/2020 8:13 AM  Airway not difficult    General Information and Staff    Patient location during procedure: OR  Anesthesiologist: Matt Dee MD    Indications and Patient Condition    Preoxygenated: yes  Mask difficulty assessment: 0 - not attempted    Final Airway Details  Final airway type: endotracheal airway      Successful airway: ETT  Cuffed: yes   Successful intubation technique: direct laryngoscopy  Endotracheal tube insertion site: oral  Blade: Brandon  Blade size: 3  ETT size (mm): 7.5  Cormack-Lehane Classification: grade IIa - partial view of glottis  Placement verified by: chest auscultation   Measured from: teeth  ETT/EBT  to teeth (cm): 22  Number of attempts at approach: 1  Assessment: lips, teeth, and gum same as pre-op and atraumatic intubation

## 2020-03-17 NOTE — NURSING NOTE
Sedation weaned down, patient awoke attempted to pull out ETT, started coughing on tube. Face turned very dark purple, PAP in high 50's ,  sats dropped to 86%. Sedation restarted per protocol.

## 2020-03-17 NOTE — ANESTHESIA POSTPROCEDURE EVALUATION
Patient: Fili Ladd    Procedure Summary     Date:  03/17/20 Room / Location:  [facility]    Anesthesia Start:  0717 Anesthesia Stop:  1000    Procedures:       TTE TRANSFEMORAL TRANSCATHETER AORTIC VALVE REPLACEMENT PERCUTANEOUS APPROACH (N/A Chest)      Transfemoral Transcatheter Aortic Valve Replacement w/Intra op TTE and possible Open Rescue Surgery (N/A ) Diagnosis:       Nonrheumatic aortic valve stenosis      Diastolic CHF due to valvular disease (CMS/HCC)      (Nonrheumatic aortic valve stenosis [I35.0])      (Diastolic CHF due to valvular disease (CMS/HCC) [I38, I50.30])    Surgeon:  Sera Ace MD; Brandon Montenegro MD Provider:  Matt Dee MD    Anesthesia Type:  MAC ASA Status:  4          Anesthesia Type: MAC    Vitals  Vitals Value Taken Time   /71 3/17/2020 12:30 PM   Temp     Pulse 68 3/17/2020 12:39 PM   Resp 20 3/17/2020 11:20 AM   SpO2 90 % 3/17/2020 12:39 PM   Vitals shown include unvalidated device data.        Post Anesthesia Care and Evaluation    Patient location during evaluation: ICU  Patient participation: complete - patient cannot participate  Level of consciousness: obtunded/minimal responses (Sedated & Intubated)  Pain score: 0  Pain management: adequate  Airway patency: patent  Anesthetic complications: No anesthetic complications  PONV Status: NA  Cardiovascular status: acceptable  Respiratory status: acceptable, ETT, ventilator and intubated  Hydration status: acceptable

## 2020-03-17 NOTE — INTERVAL H&P NOTE
We have evaluated him from a TAVR standpoint we feel that the best option for him is TAVR.  We feel that we need to move forward with this because of his compromised respiratory status we need to get his aortic stenosis situation improved.  We have discussed with him at length risk versus benefits as well as with his girlfriend he agrees and understands. H&P reviewed. The patient was examined and there are no changes to the H&P. I have explained the risks and benefits of the procedure to the patient.  The patient understands and agrees to proceed    TAVR PLAN OF CARE    The patient is an 69-year old man with severe non-rheumatic degenerative aortic stenosis.  he has class III CHF symptoms.  he has an STS score of 3%, indicating high risk for cardiac surgery.  Comorbidities include tobacco abuse, obesity, severe pulmonary disease, mild coronary artery disease, peripheral artery disease, diabetes and of course heart failure.  The patient was was discussed in the multi-disciplinary TAVR conference.  We plan to insert a 26-mm Borges S3 valve via a femoral approach.  Temporary pacing will be performed from the femoral approach.  The patient would be a candidate for bailout surgery in the event of unsuccessful TAVR.

## 2020-03-17 NOTE — CONSULTS
Patient Identification:  Fili Ladd  69 y.o.  male  1950  [id number]          LOS 0    Requesting physician: Dr Montenegro    Reason for Consultation:  Respiratory failure on vent with severe COPD post TAVR    History of Present Illness:   69-year-old white male post TAVR today.  Respiratory failure on ventilator.  Severe COPD at baseline with FEV1 31%.  No complications with TAVR procedure.  Spoke with Dr. Montenegro by telephone.  Unable to wean ventilator.  Coarse coughing and difficult to ventilate.  Chart reviewed.  History per chart review.  No family at bedside and unable to obtain any history from patient sedated on ventilator.  Not requiring pressors at this time.    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   • Hyperlipidemia    • Hypertension    • On home O2     3L CONT O2   • PVD (peripheral vascular disease) (CMS/[facility])    • RLS (restless legs syndrome)    • Sleep apnea     UNABLE TO TOLERATE USING MASK   • Valvular disease        Past Surgical History:  Past Surgical History:   Procedure Laterality Date   • CARDIAC CATHETERIZATION N/A 3/4/2020    Procedure: right and Left Heart Cath,;  Surgeon: Paulino Mariee MD;  Location:  [facility];  Service: Cardiology;  Laterality: N/A;   • CARDIAC CATHETERIZATION N/A 3/4/2020    Procedure: Coronary angiography;  Surgeon: Paulino Mariee MD;  Location:  [facility];  Service: Cardiology;  Laterality: N/A;   • CARDIAC CATHETERIZATION N/A 3/4/2020    Procedure: Left ventriculography;  Surgeon: Paulino Mariee MD;  Location:  [facility];  Service: Cardiology;  Laterality: N/A;   • COLONOSCOPY     • HERNIA  REPAIR Left     INGUINAL HERNIA   • ILIAC ARTERY STENT  11/26/2019   • POPLITEAL ARTERY STENT Left 12/18/2019   • POPLITEAL ARTERY STENT Right     X2        Home Meds:  Medications Prior to Admission   Medication Sig Dispense Refill Last Dose   • albuterol (ACCUNEB) 1.25 MG/3ML nebulizer solution Take 1 ampule by nebulization 4 (Four) Times a Day.   3/16/2020 at 1700   • amLODIPine (NORVASC) 5 MG tablet Take 1 tablet by mouth Daily. 30 tablet 11 3/17/2020 at 0330   • aspirin 81 MG EC tablet Take 81 mg by mouth Daily.   3/17/2020 at 0330   • atorvastatin (LIPITOR) 80 MG tablet Take 1 tablet by mouth Every Night. 30 tablet 11 3/16/2020 at 1700   • Chlorhexidine Gluconate (PERIDEX MT) Apply  to the mouth or throat. AS DIRECTED   3/17/2020 at 0430   • clopidogrel (PLAVIX) 75 MG tablet Take 75 mg by mouth Daily. STATES IS STILL TAKING CURRENTLY.   3/17/2020 at 0330   • furosemide (LASIX) 40 MG tablet Take 40 mg by mouth Daily.   3/17/2020 at 0330   • lisinopril (PRINIVIL,ZESTRIL) 40 MG tablet Take 40 mg by mouth Daily.   3/17/2020 at 0330   • mupirocin (BACTROBAN) 2 % ointment Apply  topically to the appropriate area as directed 2 (Two) Times a Day. AS DIRECTED   3/17/2020 at 0430   • rOPINIRole (REQUIP) 4 MG tablet Take 4 mg by mouth Every Night.   3/16/2020 at 2000   • albuterol (PROVENTIL HFA;VENTOLIN HFA) 108 (90 BASE) MCG/ACT inhaler Inhale 2 puffs Every 4 (Four) Hours As Needed for Wheezing.   3/15/2020   • budesonide-formoterol (SYMBICORT) 160-4.5 MCG/ACT inhaler Inhale 2 puffs 2 (Two) Times a Day.   3/15/2020   • tiotropium (SPIRIVA) 18 MCG per inhalation capsule Place 1 capsule into inhaler and inhale Daily.   3/3/2020         Allergies:  Allergies   Allergen Reactions   • Penicillins Other (See Comments)     HISTORY OF FOREIGN DEBBIE SYNDROME   • Sulfa Antibiotics Other (See Comments)     HISTORY OF ROSITA DEBBIE SYNDROME       Social History:   Social History     Socioeconomic History   • Marital status:  "     Spouse name: Not on file   • Number of children: Not on file   • Years of education: Not on file   • Highest education level: Not on file   Tobacco Use   • Smoking status: Current Every Day Smoker     Packs/day: 2.00   • Smokeless tobacco: Never Used   • Tobacco comment: HAS NOT SMOKED SINCE LAST WENDS. 3/11/2020   Substance and Sexual Activity   • Alcohol use: Yes     Comment: DRINKS 4-5 BEERS DAILY   • Drug use: No   • Sexual activity: Defer       Family History:  Family History   Problem Relation Age of Onset   • Arrhythmia Mother    • Heart attack Maternal Grandfather    • Heart attack Paternal Grandfather    • Hypertension Neg Hx    • Hyperlipidemia Neg Hx    • Heart failure Neg Hx    • Heart disease Neg Hx    • Malig Hyperthermia Neg Hx        Review of Systems:  Unobtainable sedated on vent    Objective:    PHYSICAL EXAM:    /77 (BP Location: Right arm, Patient Position: Lying)   Pulse 63   Temp 97.4 °F (36.3 °C) (Oral)   Resp 20   Ht 172.7 cm (68\")   Wt 114 kg (252 lb)   SpO2 91%   BMI 38.32 kg/m²  Body mass index is 38.32 kg/m². 91% 114 kg (252 lb)    GENERAL APPEARANCE:   · Well developed  · Morbidly obese  · Appears chronically ill  · No acute distress   EYES:    · PERRL                                                                           · Conjunctivae normal  · Sclerae nonicteric.  HENT:   · Atraumatic, normocephalic  · External ears and nose normal  · Moist mucous membranes and no ulcers  NECK:  · Thyroid not enlarged  · Trachea midline   RESPIRATORY:    · Nonlabored breathing   · Diminished bilateral breath sounds  · No rales.  Mild bilateral wheezing  · No dullness  CARDIOVASCULAR:    · RRR  · Normal S1, S2  · Soft systolic murmur  · Lower extremity edema: Trace  GI:   · Bowel sounds normal  · Abdomen soft , nondistended, nontender  · No abdominal masses  MUSCULOSKELETAL:  · Normal movement of extremities  · No tenderness, no deformities  · No clubbing or cyanosis "   Skin:    · No visible rashes  · No palpable nodules  PSYCHIATRIC:  · Able to assess sedated on vent  NEUROLOGIC:  Withdraws to noxious stimuli.  Sensation intact.    Lab Review:      Lab Results   Component Value Date    WBC 6.89 03/17/2020    HGB 12.4 (L) 03/17/2020    HCT 37.2 (L) 03/17/2020    MCV 84.0 03/17/2020     03/17/2020            Lab Results   Component Value Date    CREATININE 0.95 03/17/2020    BUN 12 03/17/2020     (L) 03/17/2020    K 4.2 03/17/2020    CL 91 (L) 03/17/2020    CO2 28.8 03/17/2020     Results from last 7 days   Lab Units 03/17/20  1105   PH, ARTERIAL pH units 7.365   PO2 ART mm Hg 172.0*   PCO2, ARTERIAL mm Hg 57.8*   HCO3 ART mmol/L 33.1*        No results found for: CKTOTAL, CKMB, CKMBINDEX, TROPONINI, TROPONINT        Pulmonary function test reviewed from 3/5/2020 shows severe obstruction with statistically significant improvement postbronchodilator           Imaging reviewed  chest X-ray       Assessment:  S/P TAVR 3/17/20  Postop respiratory failure with vascular edema requiring persistent mechanical ventilation  COPD with severe lung disease: FEV1 of 31%  Restrictive lung disease due to obesity  Type 2 diabetes     Recommendations:  Make appropriate ventilator changes based on ABG results.  Given his obesity and body habitus I think he would benefit from increased PEEP.  Add bronchodilators with albuterol/Atrovent MDI through vent circuit.  Has a restrictive component on pulmonary function testing which is related to his obesity.  This further complicates things.  Pulmonary vascular congestion and edema.  Would benefit from backing down IV fluid rate.  Discussed with nursing staff at bedside in CV[facility].    Thank you for allowing me to participate in the care of this patient.  I will continue to follow along with you.    CCT: 35 min      Kendall Novak MD  [facility], [facility]  Pulmonary and Critical Care Medicine    3/17/2020  13:26

## 2020-03-18 ENCOUNTER — APPOINTMENT (OUTPATIENT)
Dept: GENERAL RADIOLOGY | Facility: HOSPITAL | Age: 70
End: 2020-03-18

## 2020-03-18 ENCOUNTER — READMISSION MANAGEMENT (OUTPATIENT)
Dept: CALL CENTER | Facility: HOSPITAL | Age: 70
End: 2020-03-18

## 2020-03-18 ENCOUNTER — APPOINTMENT (OUTPATIENT)
Dept: CARDIOLOGY | Facility: HOSPITAL | Age: 70
End: 2020-03-18

## 2020-03-18 LAB
ANION GAP SERPL CALCULATED.3IONS-SCNC: 9.7 MMOL/L (ref 5–15)
AORTIC DIMENSIONLESS INDEX: 0.7 (DI)
ARTERIAL PATENCY WRIST A: ABNORMAL
ATMOSPHERIC PRESS: 755.6 MMHG
ATMOSPHERIC PRESS: 756.5 MMHG
ATMOSPHERIC PRESS: 758.1 MMHG
BASE EXCESS BLDA CALC-SCNC: 3.1 MMOL/L (ref 0–2)
BASE EXCESS BLDA CALC-SCNC: 4.4 MMOL/L (ref 0–2)
BASE EXCESS BLDA CALC-SCNC: 6.2 MMOL/L (ref 0–2)
BDY SITE: ABNORMAL
BH CV ECHO MEAS - AO MAX PG (FULL): 7 MMHG
BH CV ECHO MEAS - AO MAX PG: 12.1 MMHG
BH CV ECHO MEAS - AO MEAN PG (FULL): 3 MMHG
BH CV ECHO MEAS - AO MEAN PG: 6 MMHG
BH CV ECHO MEAS - AO ROOT AREA (BSA CORRECTED): 1.7
BH CV ECHO MEAS - AO ROOT AREA: 10.8 CM^2
BH CV ECHO MEAS - AO ROOT DIAM: 3.7 CM
BH CV ECHO MEAS - AO V2 MAX: 174 CM/SEC
BH CV ECHO MEAS - AO V2 MEAN: 118 CM/SEC
BH CV ECHO MEAS - AO V2 VTI: 36.4 CM
BH CV ECHO MEAS - AVA(I,A): 2.3 CM^2
BH CV ECHO MEAS - AVA(I,D): 2.3 CM^2
BH CV ECHO MEAS - AVA(V,A): 2 CM^2
BH CV ECHO MEAS - AVA(V,D): 2 CM^2
BH CV ECHO MEAS - BSA(HAYCOCK): 2.3 M^2
BH CV ECHO MEAS - BSA: 2.2 M^2
BH CV ECHO MEAS - BZI_BMI: 36.9 KILOGRAMS/M^2
BH CV ECHO MEAS - BZI_METRIC_HEIGHT: 172.7 CM
BH CV ECHO MEAS - BZI_METRIC_WEIGHT: 110.2 KG
BH CV ECHO MEAS - EDV(CUBED): 117.6 ML
BH CV ECHO MEAS - EDV(MOD-SP2): 91 ML
BH CV ECHO MEAS - EDV(MOD-SP4): 78 ML
BH CV ECHO MEAS - EDV(TEICH): 112.8 ML
BH CV ECHO MEAS - EF(CUBED): 74.7 %
BH CV ECHO MEAS - EF(MOD-BP): 69 %
BH CV ECHO MEAS - EF(MOD-SP2): 69.2 %
BH CV ECHO MEAS - EF(MOD-SP4): 66.7 %
BH CV ECHO MEAS - EF(TEICH): 66.4 %
BH CV ECHO MEAS - ESV(CUBED): 29.8 ML
BH CV ECHO MEAS - ESV(MOD-SP2): 28 ML
BH CV ECHO MEAS - ESV(MOD-SP4): 26 ML
BH CV ECHO MEAS - ESV(TEICH): 37.9 ML
BH CV ECHO MEAS - FS: 36.7 %
BH CV ECHO MEAS - IVS/LVPW: 1
BH CV ECHO MEAS - IVSD: 0.9 CM
BH CV ECHO MEAS - LAT PEAK E' VEL: 9 CM/SEC
BH CV ECHO MEAS - LV DIASTOLIC VOL/BSA (35-75): 35.1 ML/M^2
BH CV ECHO MEAS - LV MASS(C)D: 153 GRAMS
BH CV ECHO MEAS - LV MASS(C)DI: 68.9 GRAMS/M^2
BH CV ECHO MEAS - LV MAX PG: 5.1 MMHG
BH CV ECHO MEAS - LV MEAN PG: 3 MMHG
BH CV ECHO MEAS - LV SYSTOLIC VOL/BSA (12-30): 11.7 ML/M^2
BH CV ECHO MEAS - LV V1 MAX: 113 CM/SEC
BH CV ECHO MEAS - LV V1 MEAN: 79.2 CM/SEC
BH CV ECHO MEAS - LV V1 VTI: 26.1 CM
BH CV ECHO MEAS - LVIDD: 4.9 CM
BH CV ECHO MEAS - LVIDS: 3.1 CM
BH CV ECHO MEAS - LVLD AP2: 8.3 CM
BH CV ECHO MEAS - LVLD AP4: 7.4 CM
BH CV ECHO MEAS - LVLS AP2: 6.3 CM
BH CV ECHO MEAS - LVLS AP4: 6.7 CM
BH CV ECHO MEAS - LVOT AREA (M): 3.1 CM^2
BH CV ECHO MEAS - LVOT AREA: 3.1 CM^2
BH CV ECHO MEAS - LVOT DIAM: 2 CM
BH CV ECHO MEAS - LVPWD: 0.9 CM
BH CV ECHO MEAS - MED PEAK E' VEL: 9 CM/SEC
BH CV ECHO MEAS - MR MAX PG: 62.1 MMHG
BH CV ECHO MEAS - MR MAX VEL: 394 CM/SEC
BH CV ECHO MEAS - MV A DUR: 0.16 SEC
BH CV ECHO MEAS - MV A MAX VEL: 120 CM/SEC
BH CV ECHO MEAS - MV DEC SLOPE: 659 CM/SEC^2
BH CV ECHO MEAS - MV DEC TIME: 210 SEC
BH CV ECHO MEAS - MV E MAX VEL: 130 CM/SEC
BH CV ECHO MEAS - MV E/A: 1.1
BH CV ECHO MEAS - MV MAX PG: 6.5 MMHG
BH CV ECHO MEAS - MV MEAN PG: 3 MMHG
BH CV ECHO MEAS - MV P1/2T MAX VEL: 126 CM/SEC
BH CV ECHO MEAS - MV P1/2T: 56 MSEC
BH CV ECHO MEAS - MV V2 MAX: 127 CM/SEC
BH CV ECHO MEAS - MV V2 MEAN: 75.4 CM/SEC
BH CV ECHO MEAS - MV V2 VTI: 29.6 CM
BH CV ECHO MEAS - MVA P1/2T LCG: 1.7 CM^2
BH CV ECHO MEAS - MVA(P1/2T): 3.9 CM^2
BH CV ECHO MEAS - MVA(VTI): 2.8 CM^2
BH CV ECHO MEAS - PA ACC TIME: 0.1 SEC
BH CV ECHO MEAS - PA MAX PG (FULL): 3.5 MMHG
BH CV ECHO MEAS - PA MAX PG: 5.4 MMHG
BH CV ECHO MEAS - PA PR(ACCEL): 36.3 MMHG
BH CV ECHO MEAS - PA V2 MAX: 116 CM/SEC
BH CV ECHO MEAS - PULM A REVS DUR: 0.16 SEC
BH CV ECHO MEAS - PULM A REVS VEL: 33.2 CM/SEC
BH CV ECHO MEAS - PULM DIAS VEL: 64.3 CM/SEC
BH CV ECHO MEAS - PULM S/D: 1.1
BH CV ECHO MEAS - PULM SYS VEL: 69.6 CM/SEC
BH CV ECHO MEAS - PVA(V,A): 2.7 CM^2
BH CV ECHO MEAS - PVA(V,D): 2.7 CM^2
BH CV ECHO MEAS - QP/QS: 0.73
BH CV ECHO MEAS - RAP SYSTOLE: 3 MMHG
BH CV ECHO MEAS - RV MAX PG: 1.9 MMHG
BH CV ECHO MEAS - RV MEAN PG: 1 MMHG
BH CV ECHO MEAS - RV V1 MAX: 68.7 CM/SEC
BH CV ECHO MEAS - RV V1 MEAN: 45 CM/SEC
BH CV ECHO MEAS - RV V1 VTI: 13.3 CM
BH CV ECHO MEAS - RVOT AREA: 4.5 CM^2
BH CV ECHO MEAS - RVOT DIAM: 2.4 CM
BH CV ECHO MEAS - RVSP: 40.9 MMHG
BH CV ECHO MEAS - SI(AO): 176.3 ML/M^2
BH CV ECHO MEAS - SI(CUBED): 39.6 ML/M^2
BH CV ECHO MEAS - SI(LVOT): 36.9 ML/M^2
BH CV ECHO MEAS - SI(MOD-SP2): 28.4 ML/M^2
BH CV ECHO MEAS - SI(MOD-SP4): 23.4 ML/M^2
BH CV ECHO MEAS - SI(TEICH): 33.7 ML/M^2
BH CV ECHO MEAS - SV(AO): 391.4 ML
BH CV ECHO MEAS - SV(CUBED): 87.9 ML
BH CV ECHO MEAS - SV(LVOT): 82 ML
BH CV ECHO MEAS - SV(MOD-SP2): 63 ML
BH CV ECHO MEAS - SV(MOD-SP4): 52 ML
BH CV ECHO MEAS - SV(RVOT): 60.2 ML
BH CV ECHO MEAS - SV(TEICH): 74.9 ML
BH CV ECHO MEAS - TAPSE (>1.6): 2.2 CM2
BH CV ECHO MEAS - TR MAX VEL: 308 CM/SEC
BH CV ECHO MEASUREMENTS AVERAGE E/E' RATIO: 14.44
BH CV VAS BP RIGHT ARM: NORMAL MMHG
BH CV XLRA - RV BASE: 3.4 CM
BH CV XLRA - RV LENGTH: 2.1 CM
BH CV XLRA - RV MID: 16 CM
BH CV XLRA - TDI S': 16 CM/SEC
BUN BLD-MCNC: 13 MG/DL (ref 8–23)
BUN/CREAT SERPL: 13.8 (ref 7–25)
CALCIUM SPEC-SCNC: 9.2 MG/DL (ref 8.6–10.5)
CHLORIDE SERPL-SCNC: 95 MMOL/L (ref 98–107)
CO2 SERPL-SCNC: 30.3 MMOL/L (ref 22–29)
CREAT BLD-MCNC: 0.94 MG/DL (ref 0.76–1.27)
DEPRECATED RDW RBC AUTO: 39.1 FL (ref 37–54)
ERYTHROCYTE [DISTWIDTH] IN BLOOD BY AUTOMATED COUNT: 12.8 % (ref 12.3–15.4)
GAS FLOW AIRWAY: 3 LPM
GAS FLOW AIRWAY: 3 LPM
GFR SERPL CREATININE-BSD FRML MDRD: 80 ML/MIN/1.73
GLUCOSE BLD-MCNC: 134 MG/DL (ref 65–99)
GLUCOSE BLDC GLUCOMTR-MCNC: 123 MG/DL (ref 70–130)
GLUCOSE BLDC GLUCOMTR-MCNC: 135 MG/DL (ref 70–130)
GLUCOSE BLDC GLUCOMTR-MCNC: 141 MG/DL (ref 70–130)
GLUCOSE BLDC GLUCOMTR-MCNC: 155 MG/DL (ref 70–130)
GLUCOSE BLDC GLUCOMTR-MCNC: 160 MG/DL (ref 70–130)
GLUCOSE BLDC GLUCOMTR-MCNC: 186 MG/DL (ref 70–130)
GLUCOSE BLDC GLUCOMTR-MCNC: 196 MG/DL (ref 70–130)
GLUCOSE BLDC GLUCOMTR-MCNC: 196 MG/DL (ref 70–130)
GLUCOSE BLDC GLUCOMTR-MCNC: 204 MG/DL (ref 70–130)
GLUCOSE BLDC GLUCOMTR-MCNC: 206 MG/DL (ref 70–130)
GLUCOSE BLDC GLUCOMTR-MCNC: 254 MG/DL (ref 70–130)
HCO3 BLDA-SCNC: 30.5 MMOL/L (ref 22–28)
HCO3 BLDA-SCNC: 31.3 MMOL/L (ref 22–28)
HCO3 BLDA-SCNC: 34.3 MMOL/L (ref 22–28)
HCT VFR BLD AUTO: 40.5 % (ref 37.5–51)
HGB BLD-MCNC: 13.7 G/DL (ref 13–17.7)
HOROWITZ INDEX BLD+IHG-RTO: 40 %
LEFT ATRIUM VOLUME INDEX: 29 ML/M2
LEFT ATRIUM VOLUME: 61 CM3
MAGNESIUM SERPL-MCNC: 1.9 MG/DL (ref 1.6–2.4)
MAXIMAL PREDICTED HEART RATE: 152 BPM
MCH RBC QN AUTO: 28.5 PG (ref 26.6–33)
MCHC RBC AUTO-ENTMCNC: 33.8 G/DL (ref 31.5–35.7)
MCV RBC AUTO: 84.2 FL (ref 79–97)
MODALITY: ABNORMAL
O2 A-A PPRESDIFF RESPIRATORY: 0.4 MMHG
PCO2 BLDA: 53.8 MM HG (ref 35–45)
PCO2 BLDA: 55.8 MM HG (ref 35–45)
PCO2 BLDA: 62 MM HG (ref 35–45)
PEEP RESPIRATORY: 7.5 CM[H2O]
PH BLDA: 7.35 PH UNITS (ref 7.35–7.45)
PH BLDA: 7.35 PH UNITS (ref 7.35–7.45)
PH BLDA: 7.37 PH UNITS (ref 7.35–7.45)
PHOSPHATE SERPL-MCNC: 2.9 MG/DL (ref 2.5–4.5)
PLATELET # BLD AUTO: 188 10*3/MM3 (ref 140–450)
PMV BLD AUTO: 9.4 FL (ref 6–12)
PO2 BLDA: 64.2 MM HG (ref 80–100)
PO2 BLDA: 67.5 MM HG (ref 80–100)
PO2 BLDA: 93.7 MM HG (ref 80–100)
POTASSIUM BLD-SCNC: 4.9 MMOL/L (ref 3.5–5.2)
PSV: 8 CMH2O
RBC # BLD AUTO: 4.81 10*6/MM3 (ref 4.14–5.8)
SAO2 % BLDCOA: 90.2 % (ref 92–99)
SAO2 % BLDCOA: 91.5 % (ref 92–99)
SAO2 % BLDCOA: 96.9 % (ref 92–99)
SET MECH RESP RATE: 18
SET MECH RESP RATE: 20
SET MECH RESP RATE: 22
SODIUM BLD-SCNC: 135 MMOL/L (ref 136–145)
STRESS TARGET HR: 129 BPM
TOTAL RATE: 20 BREATHS/MINUTE
TOTAL RATE: 22 BREATHS/MINUTE
VENTILATOR MODE: ABNORMAL
VT ON VENT VENT: 640 ML
WBC NRBC COR # BLD: 9.27 10*3/MM3 (ref 3.4–10.8)

## 2020-03-18 PROCEDURE — 83735 ASSAY OF MAGNESIUM: CPT | Performed by: THORACIC SURGERY (CARDIOTHORACIC VASCULAR SURGERY)

## 2020-03-18 PROCEDURE — 94799 UNLISTED PULMONARY SVC/PX: CPT

## 2020-03-18 PROCEDURE — 80048 BASIC METABOLIC PNL TOTAL CA: CPT | Performed by: THORACIC SURGERY (CARDIOTHORACIC VASCULAR SURGERY)

## 2020-03-18 PROCEDURE — 71045 X-RAY EXAM CHEST 1 VIEW: CPT

## 2020-03-18 PROCEDURE — 82962 GLUCOSE BLOOD TEST: CPT

## 2020-03-18 PROCEDURE — 93306 TTE W/DOPPLER COMPLETE: CPT | Performed by: INTERNAL MEDICINE

## 2020-03-18 PROCEDURE — 84100 ASSAY OF PHOSPHORUS: CPT | Performed by: THORACIC SURGERY (CARDIOTHORACIC VASCULAR SURGERY)

## 2020-03-18 PROCEDURE — 25010000002 FUROSEMIDE PER 20 MG: Performed by: NURSE PRACTITIONER

## 2020-03-18 PROCEDURE — 99024 POSTOP FOLLOW-UP VISIT: CPT | Performed by: NURSE PRACTITIONER

## 2020-03-18 PROCEDURE — 25010000002 LORAZEPAM PER 2 MG: Performed by: THORACIC SURGERY (CARDIOTHORACIC VASCULAR SURGERY)

## 2020-03-18 PROCEDURE — 25010000002 FUROSEMIDE PER 20 MG: Performed by: INTERNAL MEDICINE

## 2020-03-18 PROCEDURE — 93010 ELECTROCARDIOGRAM REPORT: CPT | Performed by: INTERNAL MEDICINE

## 2020-03-18 PROCEDURE — 93306 TTE W/DOPPLER COMPLETE: CPT

## 2020-03-18 PROCEDURE — 85027 COMPLETE CBC AUTOMATED: CPT | Performed by: INTERNAL MEDICINE

## 2020-03-18 PROCEDURE — 63710000001 INSULIN LISPRO (HUMAN) PER 5 UNITS: Performed by: NURSE PRACTITIONER

## 2020-03-18 PROCEDURE — 99232 SBSQ HOSP IP/OBS MODERATE 35: CPT | Performed by: INTERNAL MEDICINE

## 2020-03-18 PROCEDURE — 93005 ELECTROCARDIOGRAM TRACING: CPT | Performed by: INTERNAL MEDICINE

## 2020-03-18 PROCEDURE — 25010000002 METHYLPREDNISOLONE PER 40 MG: Performed by: INTERNAL MEDICINE

## 2020-03-18 PROCEDURE — 82803 BLOOD GASES ANY COMBINATION: CPT

## 2020-03-18 PROCEDURE — 25010000002 HYDRALAZINE PER 20 MG: Performed by: INTERNAL MEDICINE

## 2020-03-18 PROCEDURE — 25010000002 MAGNESIUM SULFATE IN D5W 1G/100ML (PREMIX) 1-5 GM/100ML-% SOLUTION: Performed by: NURSE PRACTITIONER

## 2020-03-18 RX ORDER — LORAZEPAM 2 MG/ML
0.5 INJECTION INTRAMUSCULAR EVERY 8 HOURS
Status: DISCONTINUED | OUTPATIENT
Start: 2020-03-18 | End: 2020-03-20 | Stop reason: HOSPADM

## 2020-03-18 RX ORDER — LORAZEPAM 2 MG/ML
0.5 INJECTION INTRAMUSCULAR ONCE
Status: COMPLETED | OUTPATIENT
Start: 2020-03-18 | End: 2020-03-18

## 2020-03-18 RX ORDER — LISINOPRIL 40 MG/1
40 TABLET ORAL DAILY
Status: DISCONTINUED | OUTPATIENT
Start: 2020-03-18 | End: 2020-03-19

## 2020-03-18 RX ORDER — THIAMINE MONONITRATE (VIT B1) 100 MG
100 TABLET ORAL DAILY
Status: DISCONTINUED | OUTPATIENT
Start: 2020-03-18 | End: 2020-03-20 | Stop reason: HOSPADM

## 2020-03-18 RX ORDER — IPRATROPIUM BROMIDE AND ALBUTEROL SULFATE 2.5; .5 MG/3ML; MG/3ML
3 SOLUTION RESPIRATORY (INHALATION)
Status: DISCONTINUED | OUTPATIENT
Start: 2020-03-18 | End: 2020-03-20 | Stop reason: HOSPADM

## 2020-03-18 RX ORDER — BENZONATATE 100 MG/1
100 CAPSULE ORAL 3 TIMES DAILY PRN
Status: DISCONTINUED | OUTPATIENT
Start: 2020-03-18 | End: 2020-03-20 | Stop reason: HOSPADM

## 2020-03-18 RX ORDER — FOLIC ACID 1 MG/1
1 TABLET ORAL DAILY
Status: DISCONTINUED | OUTPATIENT
Start: 2020-03-18 | End: 2020-03-20 | Stop reason: HOSPADM

## 2020-03-18 RX ORDER — FUROSEMIDE 40 MG/1
40 TABLET ORAL DAILY
Status: DISCONTINUED | OUTPATIENT
Start: 2020-03-19 | End: 2020-03-19

## 2020-03-18 RX ORDER — FUROSEMIDE 10 MG/ML
40 INJECTION INTRAMUSCULAR; INTRAVENOUS ONCE
Status: COMPLETED | OUTPATIENT
Start: 2020-03-18 | End: 2020-03-18

## 2020-03-18 RX ORDER — GUAIFENESIN 600 MG/1
600 TABLET, EXTENDED RELEASE ORAL EVERY 12 HOURS SCHEDULED
Status: DISCONTINUED | OUTPATIENT
Start: 2020-03-18 | End: 2020-03-20 | Stop reason: HOSPADM

## 2020-03-18 RX ORDER — LISINOPRIL 10 MG/1
10 TABLET ORAL DAILY
Status: DISCONTINUED | OUTPATIENT
Start: 2020-03-18 | End: 2020-03-18

## 2020-03-18 RX ORDER — DIPHENOXYLATE HYDROCHLORIDE AND ATROPINE SULFATE 2.5; .025 MG/1; MG/1
1 TABLET ORAL DAILY
Status: DISCONTINUED | OUTPATIENT
Start: 2020-03-18 | End: 2020-03-20 | Stop reason: HOSPADM

## 2020-03-18 RX ORDER — DEXTROSE MONOHYDRATE 25 G/50ML
25 INJECTION, SOLUTION INTRAVENOUS
Status: DISCONTINUED | OUTPATIENT
Start: 2020-03-18 | End: 2020-03-20 | Stop reason: HOSPADM

## 2020-03-18 RX ORDER — MAGNESIUM SULFATE 1 G/100ML
1 INJECTION INTRAVENOUS
Status: COMPLETED | OUTPATIENT
Start: 2020-03-18 | End: 2020-03-18

## 2020-03-18 RX ORDER — NICOTINE POLACRILEX 4 MG
15 LOZENGE BUCCAL
Status: DISCONTINUED | OUTPATIENT
Start: 2020-03-18 | End: 2020-03-20 | Stop reason: HOSPADM

## 2020-03-18 RX ORDER — AMOXICILLIN 250 MG
2 CAPSULE ORAL NIGHTLY
Status: DISCONTINUED | OUTPATIENT
Start: 2020-03-18 | End: 2020-03-20 | Stop reason: HOSPADM

## 2020-03-18 RX ORDER — GUAIFENESIN 600 MG/1
1200 TABLET, EXTENDED RELEASE ORAL EVERY 12 HOURS SCHEDULED
Status: DISCONTINUED | OUTPATIENT
Start: 2020-03-18 | End: 2020-03-18

## 2020-03-18 RX ADMIN — MAGNESIUM SULFATE 1 G: 1 INJECTION INTRAVENOUS at 08:27

## 2020-03-18 RX ADMIN — CLOPIDOGREL 75 MG: 75 TABLET, FILM COATED ORAL at 08:27

## 2020-03-18 RX ADMIN — FUROSEMIDE 40 MG: 10 INJECTION, SOLUTION INTRAMUSCULAR; INTRAVENOUS at 15:20

## 2020-03-18 RX ADMIN — LISINOPRIL 40 MG: 20 TABLET ORAL at 10:18

## 2020-03-18 RX ADMIN — METHYLPREDNISOLONE SODIUM SUCCINATE 40 MG: 40 INJECTION, POWDER, FOR SOLUTION INTRAMUSCULAR; INTRAVENOUS at 03:07

## 2020-03-18 RX ADMIN — MAGNESIUM SULFATE 1 G: 1 INJECTION INTRAVENOUS at 09:40

## 2020-03-18 RX ADMIN — ATORVASTATIN CALCIUM 80 MG: 80 TABLET, FILM COATED ORAL at 20:44

## 2020-03-18 RX ADMIN — Medication 100 MG: at 09:27

## 2020-03-18 RX ADMIN — BUDESONIDE AND FORMOTEROL FUMARATE DIHYDRATE 2 PUFF: 160; 4.5 AEROSOL RESPIRATORY (INHALATION) at 08:32

## 2020-03-18 RX ADMIN — IPRATROPIUM BROMIDE AND ALBUTEROL SULFATE 3 ML: 2.5; .5 SOLUTION RESPIRATORY (INHALATION) at 20:21

## 2020-03-18 RX ADMIN — LORAZEPAM 0.5 MG: 2 INJECTION INTRAMUSCULAR; INTRAVENOUS at 21:27

## 2020-03-18 RX ADMIN — IPRATROPIUM BROMIDE AND ALBUTEROL SULFATE 3 ML: 2.5; .5 SOLUTION RESPIRATORY (INHALATION) at 11:17

## 2020-03-18 RX ADMIN — NICOTINE 1 PATCH: 7 PATCH, EXTENDED RELEASE TRANSDERMAL at 09:30

## 2020-03-18 RX ADMIN — IPRATROPIUM BROMIDE AND ALBUTEROL SULFATE 3 ML: 2.5; .5 SOLUTION RESPIRATORY (INHALATION) at 15:04

## 2020-03-18 RX ADMIN — ACETAMINOPHEN 650 MG: 325 TABLET, FILM COATED ORAL at 06:26

## 2020-03-18 RX ADMIN — GUAIFENESIN 1200 MG: 600 TABLET, EXTENDED RELEASE ORAL at 09:27

## 2020-03-18 RX ADMIN — MINERAL OIL AND WHITE PETROLATUM: 150; 830 OINTMENT OPHTHALMIC at 20:45

## 2020-03-18 RX ADMIN — LORAZEPAM 0.5 MG: 2 INJECTION INTRAMUSCULAR; INTRAVENOUS at 17:27

## 2020-03-18 RX ADMIN — LORAZEPAM 0.5 MG: 2 INJECTION INTRAMUSCULAR; INTRAVENOUS at 08:26

## 2020-03-18 RX ADMIN — FUROSEMIDE 40 MG: 10 INJECTION, SOLUTION INTRAMUSCULAR; INTRAVENOUS at 08:27

## 2020-03-18 RX ADMIN — ALBUTEROL SULFATE 6 PUFF: 90 AEROSOL, METERED RESPIRATORY (INHALATION) at 00:14

## 2020-03-18 RX ADMIN — INSULIN LISPRO 2 UNITS: 100 INJECTION, SOLUTION INTRAVENOUS; SUBCUTANEOUS at 09:27

## 2020-03-18 RX ADMIN — INSULIN LISPRO 2 UNITS: 100 INJECTION, SOLUTION INTRAVENOUS; SUBCUTANEOUS at 17:27

## 2020-03-18 RX ADMIN — METOPROLOL TARTRATE 2.5 MG: 5 INJECTION, SOLUTION INTRAVENOUS at 09:40

## 2020-03-18 RX ADMIN — DOCUSATE SODIUM 50MG AND SENNOSIDES 8.6MG 2 TABLET: 8.6; 5 TABLET, FILM COATED ORAL at 20:44

## 2020-03-18 RX ADMIN — HYDRALAZINE HYDROCHLORIDE 10 MG: 20 INJECTION INTRAMUSCULAR; INTRAVENOUS at 06:39

## 2020-03-18 RX ADMIN — METOPROLOL TARTRATE 25 MG: 25 TABLET ORAL at 20:44

## 2020-03-18 RX ADMIN — IPRATROPIUM BROMIDE 6 PUFF: 17 AEROSOL, METERED RESPIRATORY (INHALATION) at 00:13

## 2020-03-18 RX ADMIN — FOLIC ACID 1 MG: 1 TABLET ORAL at 09:27

## 2020-03-18 RX ADMIN — METHYLPREDNISOLONE SODIUM SUCCINATE 40 MG: 40 INJECTION, POWDER, FOR SOLUTION INTRAMUSCULAR; INTRAVENOUS at 15:20

## 2020-03-18 RX ADMIN — ALBUTEROL SULFATE 2.5 MG: 2.5 SOLUTION RESPIRATORY (INHALATION) at 13:08

## 2020-03-18 RX ADMIN — INSULIN LISPRO 6 UNITS: 100 INJECTION, SOLUTION INTRAVENOUS; SUBCUTANEOUS at 20:44

## 2020-03-18 RX ADMIN — METOPROLOL TARTRATE 25 MG: 25 TABLET ORAL at 07:54

## 2020-03-18 RX ADMIN — Medication 1 DROP: at 20:45

## 2020-03-18 RX ADMIN — ASPIRIN 81 MG: 81 TABLET, COATED ORAL at 08:27

## 2020-03-18 RX ADMIN — Medication 1 TABLET: at 09:27

## 2020-03-18 RX ADMIN — GUAIFENESIN 600 MG: 600 TABLET, EXTENDED RELEASE ORAL at 20:44

## 2020-03-18 RX ADMIN — ROPINIROLE 4 MG: 2 TABLET, FILM COATED ORAL at 20:45

## 2020-03-18 RX ADMIN — INSULIN LISPRO 4 UNITS: 100 INJECTION, SOLUTION INTRAVENOUS; SUBCUTANEOUS at 12:07

## 2020-03-18 NOTE — CONSULTS
OPHTHALMOLOGY CONSULT NOTE    Patient Identification:  Name: Fili Ladd  Age: 69 y.o.  Sex: male  :  1950  MRN: [id number]                                               Requesting Physician: per order  Reason for consult: TERESA OD    History of Present Illness:  69 y.o. male s/p aortic valve transfemoral placement.  On aspirin and plavix.  Has COPD and had coughing spell.  Nursing noticed acute TERESA that progressed to involve the lid.  Called ophthalmology to ensure no orbital component after noted blurry vision.  Patient currently has some irritation but no pain, trouble moving the eye or double vision    Problem List:  Active Problems:    Nonrheumatic aortic valve stenosis    Diastolic CHF due to valvular disease (CMS/[facility])      Past Medical History:  Past Medical History:   Diagnosis Date   • COPD (chronic obstructive pulmonary disease) (CMS/[facility])    • Diabetes mellitus (CMS/[facility])     TYPE 2   • GERD (gastroesophageal reflux disease)    • Heart murmur    • History of Torrez-Elvis toxic epidermal necrolysis overlap syndrome     SEVERAL YEARS AGO   • Hyperlipidemia    • Hypertension    • On home O2     3L CONT O2   • PVD (peripheral vascular disease) (CMS/[facility])    • RLS (restless legs syndrome)    • Sleep apnea     UNABLE TO TOLERATE USING MASK   • Valvular disease        Past Surgical History:  Past Surgical History:   Procedure Laterality Date   • AORTIC VALVE REPAIR/REPLACEMENT N/A 3/17/2020    Procedure: TTE TRANSFEMORAL TRANSCATHETER AORTIC VALVE REPLACEMENT PERCUTANEOUS APPROACH;  Surgeon: Sera Ace MD;  Location: [facility] OR ;  Service: Cardiothoracic;  Laterality: N/A;   • AORTIC VALVE REPAIR/REPLACEMENT N/A 3/17/2020    Procedure: Transfemoral Transcatheter Aortic Valve Replacement w/Intra op TTE and possible Open Rescue Surgery;  Surgeon: Brandon Montenegro MD;  Location: [facility] OR ;  Service: Cardiovascular;  Laterality: N/A;   • CARDIAC CATHETERIZATION N/A  3/4/2020    Procedure: right and Left Heart Cath,;  Surgeon: Paulino Mariee MD;  Location:  [facility];  Service: Cardiology;  Laterality: N/A;   • CARDIAC CATHETERIZATION N/A 3/4/2020    Procedure: Coronary angiography;  Surgeon: Paulino Mariee MD;  Location:  [facility];  Service: Cardiology;  Laterality: N/A;   • CARDIAC CATHETERIZATION N/A 3/4/2020    Procedure: Left ventriculography;  Surgeon: Paulino Mariee MD;  Location:  [facility];  Service: Cardiology;  Laterality: N/A;   • COLONOSCOPY     • HERNIA REPAIR Left     INGUINAL HERNIA   • ILIAC ARTERY STENT  11/26/2019   • POPLITEAL ARTERY STENT Left 12/18/2019   • POPLITEAL ARTERY STENT Right     X2        Past Ocular History:    ROS:  Negative unless mentioned above in the HPI    Eyes: denies cataract, glaucoma, RD  Ocular Medication: none    Home Meds:  Medications Prior to Admission   Medication Sig Dispense Refill Last Dose   • albuterol (ACCUNEB) 1.25 MG/3ML nebulizer solution Take 1 ampule by nebulization 4 (Four) Times a Day.   3/16/2020 at 1700   • amLODIPine (NORVASC) 5 MG tablet Take 1 tablet by mouth Daily. 30 tablet 11 3/17/2020 at 0330   • aspirin 81 MG EC tablet Take 81 mg by mouth Daily.   3/17/2020 at 0330   • atorvastatin (LIPITOR) 80 MG tablet Take 1 tablet by mouth Every Night. 30 tablet 11 3/16/2020 at 1700   • Chlorhexidine Gluconate (PERIDEX MT) Apply  to the mouth or throat. AS DIRECTED   3/17/2020 at 0430   • clopidogrel (PLAVIX) 75 MG tablet Take 75 mg by mouth Daily. STATES IS STILL TAKING CURRENTLY.   3/17/2020 at 0330   • furosemide (LASIX) 40 MG tablet Take 40 mg by mouth Daily.   3/17/2020 at 0330   • lisinopril (PRINIVIL,ZESTRIL) 40 MG tablet Take 40 mg by mouth Daily.   3/17/2020 at 0330   • mupirocin (BACTROBAN) 2 % ointment Apply  topically to the appropriate area as directed 2 (Two) Times a Day. AS DIRECTED   3/17/2020 at 0430   • rOPINIRole  (REQUIP) 4 MG tablet Take 4 mg by mouth Every Night.   3/16/2020 at 2000   • albuterol (PROVENTIL HFA;VENTOLIN HFA) 108 (90 BASE) MCG/ACT inhaler Inhale 2 puffs Every 4 (Four) Hours As Needed for Wheezing.   3/15/2020   • budesonide-formoterol (SYMBICORT) 160-4.5 MCG/ACT inhaler Inhale 2 puffs 2 (Two) Times a Day.   3/15/2020   • tiotropium (SPIRIVA) 18 MCG per inhalation capsule Place 1 capsule into inhaler and inhale Daily.   3/3/2020       Current Meds:     Current Facility-Administered Medications:   •  acetaminophen (TYLENOL) tablet 650 mg, 650 mg, Oral, Q4H PRN, Brandon Montenegro MD, 650 mg at 03/18/20 0626  •  albuterol (PROVENTIL) nebulizer solution 0.083% 2.5 mg/3mL, 2.5 mg, Nebulization, Q4H PRN, Brandon Montenegro MD, 2.5 mg at 03/18/20 1308  •  artificial tears ophthalmic ointment, , Right Eye, Nightly, Dario Whiteside MD  •  atorvastatin (LIPITOR) tablet 80 mg, 80 mg, Oral, Nightly, Brandon Montenegro MD  •  benzonatate (TESSALON) capsule 100 mg, 100 mg, Oral, TID PRN, January Stout APRN  •  budesonide-formoterol (SYMBICORT) 160-4.5 MCG/ACT inhaler 2 puff, 2 puff, Inhalation, BID - RT, Brandon Montenegro MD, 2 puff at 03/18/20 0832  •  dextrose (D50W) 25 g/ 50mL Intravenous Solution 25 g, 25 g, Intravenous, Q15 Min PRN, January Stout APRPEACE  •  dextrose (D50W) 25 g/ 50mL Intravenous Solution 25-50 mL, 25-50 mL, Intravenous, Q30 Min PRN, Dragan Munroe MD  •  dextrose (GLUTOSE) oral gel 15 g, 15 g, Oral, Q15 Min PRN, January Stout APRPEACE  •  folic acid (FOLVITE) tablet 1 mg, 1 mg, Oral, Daily, January Stout APRN, 1 mg at 03/18/20 0927  •  [START ON 3/19/2020] furosemide (LASIX) tablet 40 mg, 40 mg, Oral, Daily, January Stout APRN  •  glucagon (human recombinant) (GLUCAGEN DIAGNOSTIC) injection 1 mg, 1 mg, Subcutaneous, Q15 Min PRN, January Stout APRN  •  Glycerin-Hypromellose- (ARTIFICIAL TEARS) 0.2-0.2-1 % ophthalmic solution solution 1  drop, 1 drop, Right Eye, Q1H PRN, Dario Whiteside MD  •  guaiFENesin (MUCINEX) 12 hr tablet 600 mg, 600 mg, Oral, Q12H, January Stout APRN  •  hydrALAZINE (APRESOLINE) injection 10 mg, 10 mg, Intravenous, Q6H PRN, Brandon Montenegro MD, 10 mg at 03/18/20 0639  •  insulin lispro (humaLOG) injection 0-9 Units, 0-9 Units, Subcutaneous, 4x Daily With Meals & Nightly, January Stout APRN, 4 Units at 03/18/20 1207  •  ipratropium-albuterol (DUO-NEB) nebulizer solution 3 mL, 3 mL, Nebulization, Q4H - RT, Sera Ace MD, 3 mL at 03/18/20 1504  •  lisinopril (PRINIVIL,ZESTRIL) tablet 40 mg, 40 mg, Oral, Daily, January Stout APRN, 40 mg at 03/18/20 1018  •  LORazepam (ATIVAN) injection 0.5 mg, 0.5 mg, Intravenous, Q8H, Sera Ace MD, 0.5 mg at 03/18/20 0826  •  methylPREDNISolone sodium succinate (SOLU-Medrol) injection 40 mg, 40 mg, Intravenous, Q12H, Kendall Novak MD, 40 mg at 03/18/20 1520  •  metoprolol tartrate (LOPRESSOR) tablet 25 mg, 25 mg, Oral, Q12H, January Stout APRN, 25 mg at 03/18/20 0754  •  morphine injection 1 mg, 1 mg, Intravenous, Q1H PRN, Dragan Munroe MD, 2 mg at 03/17/20 2301  •  multivitamin (THERAGRAN) tablet 1 tablet, 1 tablet, Oral, Daily, January Stout APRN, 1 tablet at 03/18/20 0927  •  nicotine (NICODERM CQ) 7 MG/24HR patch 1 patch, 1 patch, Transdermal, Q24H, January Stout APRN, 1 patch at 03/18/20 0930  •  rOPINIRole (REQUIP) tablet 4 mg, 4 mg, Oral, Nightly, Brandno Montenegro MD  •  sennosides-docusate (PERICOLACE) 8.6-50 MG per tablet 2 tablet, 2 tablet, Oral, Nightly, January Stout APRN  •  sodium chloride 0.9 % flush 30 mL, 30 mL, Intravenous, Once PRN, Dragan Munroe MD  •  sodium chloride 0.9 % infusion, 30 mL/hr, Intravenous, Continuous PRN, Dragan Munroe MD  •  thiamine (VITAMIN B-1) tablet 100 mg, 100 mg, Oral, Daily, January Stout APRN, 100 mg at 03/18/20  0927    Allergies:  Allergies   Allergen Reactions   • Penicillins Other (See Comments)     HISTORY OF FOREIGN DEBBIE SYNDROME   • Sulfa Antibiotics Other (See Comments)     HISTORY OF ROSITA DEBBIE SYNDROME       Social History:   Social History     Tobacco Use   • Smoking status: Current Every Day Smoker     Packs/day: 2.00   • Smokeless tobacco: Never Used   • Tobacco comment: HAS NOT SMOKED SINCE LAST WENDS. 3/11/2020   Substance Use Topics   • Alcohol use: Yes     Comment: DRINKS 4-5 BEERS DAILY        Family History:  Denies h/o glaucoma, retinal detachment, strabismus, amblyopia    Objective:  General Appearance: NAD    Exam:    VA sc near P T EOM    20/ PH 20/80 5->3mm no apd 17 Full    20/ PH 20/60 5->3mm no apd 16 Full      SLE/PLE    With 20D lens at bedside     OD OS   External/Lid Mild temporal lower lid ecchymosis wnl   Conj/sclera 360 degree TERESA worse temporally White/quiet   Cornea clear clear   Anterior Chamber formed formed   Iris Round/reactive Round/reactive   Lens clear clear   Vitreous clear clear     Dilated Fundus Exam: deferred, no indicationsy    Imaging:  XR Chest 1 View   Preliminary Result   Minimal improvement in edema/CHF.                      XR Chest 1 View   Final Result      Arteriogram (Autofinalize)   Final Result          Data Review:      Assessment/Recommendations:  Fili Ladd is a 69 y.o.     1) TERESA OD:  Dense, advised patient to use ATs prn with ointment during sleep for comfort from irritation.  No signs of orbital component.  Would advise to hold aspirin but may keep plavix on. Asked nurse to monitor for the next 3-6 hours hourly to ensure no signs of compartment syndrome.          Patient needs to follow up in 1-2 months, call for appointment [phone].    Thank you for the consult.    Dario Whiteside MD  3/18/2020 16:42

## 2020-03-18 NOTE — PROGRESS NOTES
"Discharge Planning Assessment  [facility]     Patient Name: Fili Ladd  MRN: [id number]  Today's Date: 3/18/2020    Admit Date: 3/17/2020    Discharge Needs Assessment     Row Name 03/18/20 1523       Living Environment    Lives With  significant other    Name(s) of Who Lives With Patient  Orquidea kayla Rascon.    Current Living Arrangements  home/apartment/condo    Primary Care Provided by  self    Provides Primary Care For  no one    Family Caregiver if Needed  significant other    Family Caregiver Names  Orquidea, s.o.    Quality of Family Relationships  helpful;supportive    Able to Return to Prior Arrangements  yes       Resource/Environmental Concerns    Resource/Environmental Concerns  none       Transition Planning    Patient/Family Anticipates Transition to  home    Patient/Family Anticipated Services at Transition  none    Transportation Anticipated  family or friend will provide       Discharge Needs Assessment    Readmission Within the Last 30 Days  planned readmission    Concerns to be Addressed  discharge planning    Equipment Currently Used at Home  glucometer;oxygen;nebulizer;respiratory supplies    Anticipated Changes Related to Illness  none    Equipment Needed After Discharge  none    Provided Post Acute Provider List?  Refused    Refused Provider List Comment  Denied need for services or list.          Discharge Plan     Row Name 03/18/20 1529       Plan    Plan  Home;  Declines needs.    Plan Comments  Spoke with Pt at bedside.  CCP introduced self and role.  Pt confirmed information on face sheet.  Pt reports he is IADL'S, retired & drives.  Pt lives in an apartment with his \"girlfriend\", Orquidea Rascon ([phone]).  Pt reports PCP is Micah Michel.  Pt confirms pharmacy as [facility].  Pt denies issues with affording his medications.  Pt denies past home health and sub-acute rehab.  Pt has continuous oxygen provided by [facility].  Pt plans to return home at discharge with assistance " of his s.o., Orquidea.  Orquidea or Pt son will transport him home at discharge.  Pt denies d/c needs.  CCP will continue to follow…BRIAN MARTELL/CCP        Destination      Coordination has not been started for this encounter.      Durable Medical Equipment      Coordination has not been started for this encounter.      Dialysis/Infusion      Coordination has not been started for this encounter.      Home Medical Care      Coordination has not been started for this encounter.      Therapy      Coordination has not been started for this encounter.      Community Resources      Coordination has not been started for this encounter.          Demographic Summary     Row Name 03/18/20 1522       General Information    Admission Type  inpatient    Arrived From  PACU/recovery room    Required Notices Provided  Important Message from Medicare    Referral Source  admission list    Reason for Consult  discharge planning    Preferred Language  English     Used During This Interaction  no        Functional Status     Row Name 03/18/20 1523       Functional Status    Usual Activity Tolerance  good    Current Activity Tolerance  good       Functional Status, IADL    Medications  independent    Meal Preparation  independent    Housekeeping  independent    Laundry  independent    Shopping  independent       Mental Status    General Appearance WDL  WDL       Mental Status Summary    Recent Changes in Mental Status/Cognitive Functioning  no changes       Employment/    Employment Status  retired        Psychosocial    No documentation.       Abuse/Neglect    No documentation.       Legal    No documentation.       Substance Abuse    No documentation.       Patient Forms    No documentation.           Noy Rojas RN

## 2020-03-18 NOTE — PROGRESS NOTES
[facility] INPATIENT PROGRESS NOTE         [facility] CARDIOVASC UNIT    3/18/2020      PATIENT IDENTIFICATION:  Name: Fili Ladd ADMIT: 3/17/2020   : 1950  PCP: Micah Michel MD    MRN: [id number] LOS: 1 days   AGE/SEX: 69 y.o. male  ROOM: [facility]                     LOS 1    Reason for visit: Follow-up COPD exacerbation with respiratory failure      SUBJECTIVE:      Looks much better.  Off ventilator this morning.  Moving air much better.  No wheezing or rhonchi today.  Discussed with nursing staff at bedside.    Objective   OBJECTIVE:    Vital Sign Min/Max for last 24 hours  Temp  Min: 97.8 °F (36.6 °C)  Max: 97.8 °F (36.6 °C)   BP  Min: 103/76  Max: 161/87   Pulse  Min: 63  Max: 115   Resp  Min: 12  Max: 21   SpO2  Min: 90 %  Max: 97 %   No data recorded   Weight  Min: 110 kg (242 lb 15.2 oz)  Max: 110 kg (243 lb)       FiO2 (%):  [40 %-49 %] 40 %  S RR:  [16] 16  PEEP/CPAP (cm H2O):  [7.5 cm H20-10 cm H20] 7.5 cm H20  MAP (cm H2O):  [13-16] 14           FiO2 (%): 40 %     Body mass index is 36.95 kg/m².    Intake/Output Summary (Last 24 hours) at 3/18/2020 1203  Last data filed at 3/18/2020 1000  Gross per 24 hour   Intake 202 ml   Output 3970 ml   Net -1948 ml         Exam:  GEN:  No distress, appears stated age  EYES:   PERRL, anicteric sclera  ENT:    External ears/nose normal, OP clear  NECK:  No adenopathy, midline trachea  LUNGS: Normal chest on inspection, palpation and diminished breath sounds on auscultation  CV:  Normal S1S2, without murmur  ABD:  Non tender, non distended, no hepatosplenomegaly, +BS  EXT:  No edema, cyanosis or clubbing    Scheduled meds:    aspirin 81 mg Oral Daily   atorvastatin 80 mg Oral Nightly   budesonide-formoterol 2 puff Inhalation BID - RT   clopidogrel 75 mg Oral Daily   folic acid 1 mg Oral Daily   [START ON 3/19/2020] furosemide 40 mg Oral Daily   guaiFENesin 1,200 mg Oral Q12H   insulin lispro 0-9 Units Subcutaneous 4x Daily With Meals &  Nightly   ipratropium-albuterol 3 mL Nebulization Q4H - RT   lisinopril 40 mg Oral Daily   LORazepam 0.5 mg Intravenous Q8H   methylPREDNISolone sodium succinate 40 mg Intravenous Q12H   metoprolol tartrate 25 mg Oral Q12H   multivitamin 1 tablet Oral Daily   nicotine 1 patch Transdermal Q24H   rOPINIRole 4 mg Oral Nightly   sennosides-docusate 2 tablet Oral Nightly   thiamine 100 mg Oral Daily     IV meds:                        sodium chloride 30 mL/hr     Data Review:  Results from last 7 days   Lab Units 03/18/20  0423 03/17/20  2324 03/17/20  1123 03/16/20  0703  03/11/20  1235   SODIUM mmol/L 135* 137 135* 136  --   --    POTASSIUM mmol/L 4.9 4.6 4.2 4.5  --   --    CHLORIDE mmol/L 95* 94* 91* 86*  --   --    CO2 mmol/L 30.3* 26.9 28.8 37.2*  --   --    BUN mg/dL 13 14 12 12  --   --    CREATININE mg/dL 0.94 1.12 0.95 0.98  --  1.00   GLUCOSE mg/dL 134* 221* 174* 155*   < >  --    CALCIUM mg/dL 9.2 9.2 8.5* 9.4  --   --     < > = values in this interval not displayed.         Estimated Creatinine Clearance: 90.4 mL/min (by C-G formula based on SCr of 0.94 mg/dL).  Results from last 7 days   Lab Units 03/18/20  0423 03/17/20  1123 03/17/20  0931 03/17/20  0913 03/17/20  0858  03/16/20  0703   WBC 10*3/mm3 9.27 6.89  --   --   --   --  7.66   HEMOGLOBIN g/dL 13.7 12.4*  --   --   --   --  14.4   HEMOGLOBIN, POC g/dL  --   --  13.3 13.6 13.3   < >  --    PLATELETS 10*3/mm3 188 186  --   --   --   --  217    < > = values in this interval not displayed.     Results from last 7 days   Lab Units 03/17/20  1123 03/16/20  0704   INR  1.20* 1.00     Results from last 7 days   Lab Units 03/16/20  0703   ALT (SGPT) U/L 17   AST (SGOT) U/L 22     Results from last 7 days   Lab Units 03/18/20  0826 03/18/20  0552 03/18/20  0106 03/17/20  1500 03/17/20  1105 03/17/20  0931 03/17/20  0913   PH, ARTERIAL pH units 7.346* 7.352 7.373 7.537* 7.365 7.40 7.36   PO2 ART mm Hg 67.5* 64.2* 93.7 58.5* 172.0*  --   --    PCO2, ARTERIAL  mm Hg 55.8* 62.0* 53.8* 38.7 57.8*  --   --    HCO3 ART mmol/L 30.5* 34.3* 31.3* 32.9* 33.1*  --   --              2D echo 3/18/2020 reviewed: Grade 2 diastolic dysfunction.  EF 69%    Chest x-ray 3/18/2020 reviewed shows some improvement mildly in pulmonary edema.    )        Active Hospital Problems    Diagnosis  POA   • Diastolic CHF due to valvular disease (CMS/[facility]) [I38, I50.30]  Unknown   • Nonrheumatic aortic valve stenosis [I35.0]  Unknown      Resolved Hospital Problems   No resolved problems to display.       Assessment   ASSESSMENT:  S/P TAVR 3/17/20  Postop respiratory failure with vascular edema requiring persistent mechanical ventilation  COPD with severe lung disease: FEV1 of 31%  Restrictive lung disease due to obesity  Type 2 diabetes     PLAN:  Successfully extubated.  Continue bronchodilators and steroid with taper for COPD exacerbation.  Has severe lung disease at baseline.  Wean oxygen as able.      Time spent with coordination of care, discussion with patient and nursing greater than 50% of 35 minutes.    Kendall Novak MD  Pulmonary and Critical Care Medicine  [facility]  3/18/2020    12:03

## 2020-03-18 NOTE — PROGRESS NOTES
"Fili Ladd  1950 69 y.o.  [id number]      Patient Care Team:  Micah Michel MD as PCP - General (Internal Medicine)  Marya Boyd APRN as PCP - Claims Attributed    CC: Severe aortic stenosis, status post TAVR, severe COPD    Interval History: Much better extubated today      Objective   Vital Signs  Temp:  [97.8 °F (36.6 °C)] 97.8 °F (36.6 °C)  Heart Rate:  [] 102  Resp:  [12-21] 18  BP: (103-161)/(64-87) 134/77  Arterial Line BP: ()/(39-79) 154/65  FiO2 (%):  [40 %-49 %] 40 %    Intake/Output Summary (Last 24 hours) at 3/18/2020 1330  Last data filed at 3/18/2020 1000  Gross per 24 hour   Intake 2022 ml   Output 2570 ml   Net -548 ml     Flowsheet Rows      First Filed Value   Admission Height  172.7 cm (68\") Documented at 03/17/2020 0552   Admission Weight  114 kg (252 lb) Documented at 03/17/2020 0552          Physical Exam:   General Appearance:    Alert,oriented, in no acute distress   Lungs:     Clear to auscultation,BS are equal    Heart:    Normal S1 and S2, RRR without murmur, gallop or rub   HEENT:    Sclerae are clear, no JVD or adenopathy   Abdomen:     Normal bowel sounds, soft non-tender, non-distended, no HSM   Extremities:   Moves all extremities well, no edema, no cyanosis, no             Redness, no rash     Medication Review:        aspirin 81 mg Oral Daily   atorvastatin 80 mg Oral Nightly   budesonide-formoterol 2 puff Inhalation BID - RT   clopidogrel 75 mg Oral Daily   folic acid 1 mg Oral Daily   [START ON 3/19/2020] furosemide 40 mg Oral Daily   guaiFENesin 600 mg Oral Q12H   insulin lispro 0-9 Units Subcutaneous 4x Daily With Meals & Nightly   ipratropium-albuterol 3 mL Nebulization Q4H - RT   lisinopril 40 mg Oral Daily   LORazepam 0.5 mg Intravenous Q8H   methylPREDNISolone sodium succinate 40 mg Intravenous Q12H   metoprolol tartrate 25 mg Oral Q12H   multivitamin 1 tablet Oral Daily   nicotine 1 patch Transdermal Q24H   rOPINIRole 4 mg Oral Nightly "   sennosides-docusate 2 tablet Oral Nightly   thiamine 100 mg Oral Daily       sodium chloride 30 mL/hr         I reviewed the patient's new clinical results.  I personally viewed and interpreted the patient's EKG/Telemetry data    Assessment/Plan  Active Hospital Problems    Diagnosis  POA   • Nonrheumatic aortic valve stenosis [I35.0]  Unknown     Priority: High   • Diastolic CHF due to valvular disease (CMS/HCC) [I38, I50.30]  Unknown      Resolved Hospital Problems   No resolved problems to display.       Looks remarkably well and doing pretty well we will move him out of the open heart area and hopefully look to discharge him tomorrow    Brandon Montenegro MD  03/18/20  13:30

## 2020-03-18 NOTE — OUTREACH NOTE
Medical Week 2 Survey      Responses   [facility] patient discharged from?  [facility]   Does the patient have one of the following disease processes/diagnoses(primary or secondary)?  Other   Week 2 attempt successful?  No   Revoke  Readmitted          Kristen Webster RN

## 2020-03-18 NOTE — PROGRESS NOTES
" LOS: 1 day   Patient Care Team:  Micah Michel MD as PCP - General (Internal Medicine)  Marya Boyd APRN as PCP - Claims Attributed    Chief Complaint: post op TAVR    Subjective:  Symptoms:  Improved.  He reports cough.  No shortness of breath, chest pain, chest pressure or anxiety.    Diet:  Adequate intake.  No nausea or vomiting.    Activity level: Returning to normal.    Pain:  He complains of pain that is mild.  He reports pain is improving.  Pain is well controlled and requiring pain medication.      C/o some soreness in groins    Vital Signs  Heart Rate:  [61-82] 82  Resp:  [16-21] 17  BP: ()/(56-87) 161/87  Arterial Line BP: ()/(39-79) 162/66  FiO2 (%):  [40 %-100 %] 40 %  Body mass index is 36.95 kg/m².    Intake/Output Summary (Last 24 hours) at 3/18/2020 0718  Last data filed at 3/18/2020 0654  Gross per 24 hour   Intake 1822 ml   Output 3270 ml   Net -1448 ml     No intake/output data recorded.        03/17/20  0552 03/17/20  1000 03/18/20  0600   Weight: 114 kg (252 lb) 111 kg (244 lb 11.4 oz) 110 kg (242 lb 15.2 oz)     Objective:  General Appearance:  Comfortable and in no acute distress.    Vital signs: (most recent): Blood pressure 129/77, pulse 97, temperature 97.4 °F (36.3 °C), temperature source Oral, resp. rate 20, height 172.7 cm (67.99\"), weight 110 kg (242 lb 15.2 oz), SpO2 91 %.  Vital signs are normal.  No fever.    Output: Producing urine.    HEENT: (O2 nasal cannula in place.  Receiving breathing treatment.)    Lungs:  Normal effort and normal respiratory rate.  He is not in respiratory distress.  There are decreased breath sounds, wheezes and rhonchi.  No rales.    Heart: Tachycardia.  Regular rhythm.  No murmur, gallop or friction rub. (SR/ST with runs of what appears to be atrial fibrillation)  Chest: Symmetric chest wall expansion. Chest wall tenderness present.    Abdomen: Abdomen is soft.  (Obese.).  Bowel sounds are normal.   There is no abdominal tenderness.   "   Extremities: There is dependent edema.  (Mild edema bilateral LE)  Pulses: Distal pulses are intact.    Neurological: Patient is alert and oriented to person, place and time.  GCS score is 15.    Pupils:  Pupils are equal, round, and reactive to light.  Pupils are equal.   Skin:  Warm and dry.  (Bilateral groin sites clean, dry, and intact)      Results Review:      WBC WBC   Date Value Ref Range Status   03/18/2020 9.27 3.40 - 10.80 10*3/mm3 Final   03/17/2020 6.89 3.40 - 10.80 10*3/mm3 Final   03/16/2020 7.66 3.40 - 10.80 10*3/mm3 Final      HGB Hemoglobin   Date Value Ref Range Status   03/18/2020 13.7 13.0 - 17.7 g/dL Final   03/17/2020 12.4 (L) 13.0 - 17.7 g/dL Final   03/17/2020 13.3 12.0 - 17.0 g/dL Final   03/17/2020 13.6 12.0 - 17.0 g/dL Final   03/17/2020 13.3 12.0 - 17.0 g/dL Final   03/17/2020 14.3 12.0 - 17.0 g/dL Final   03/16/2020 14.4 13.0 - 17.7 g/dL Final      HCT Hematocrit   Date Value Ref Range Status   03/18/2020 40.5 37.5 - 51.0 % Final   03/17/2020 37.2 (L) 37.5 - 51.0 % Final   03/17/2020 39 38 - 51 % Final   03/17/2020 40 38 - 51 % Final   03/17/2020 39 38 - 51 % Final   03/17/2020 42 38 - 51 % Final   03/16/2020 44.0 37.5 - 51.0 % Final      Platelets Platelets   Date Value Ref Range Status   03/18/2020 188 140 - 450 10*3/mm3 Final   03/17/2020 186 140 - 450 10*3/mm3 Final   03/16/2020 217 140 - 450 10*3/mm3 Final        PT/INR:    Protime   Date Value Ref Range Status   03/17/2020 15.0 (H) 11.7 - 14.2 Seconds Final   03/16/2020 12.8 11.7 - 14.2 Seconds Final   /  INR   Date Value Ref Range Status   03/17/2020 1.20 (H) 0.90 - 1.10 Final   03/16/2020 1.00 0.90 - 1.10 Final       Sodium Sodium   Date Value Ref Range Status   03/18/2020 135 (L) 136 - 145 mmol/L Final   03/17/2020 137 136 - 145 mmol/L Final   03/17/2020 135 (L) 136 - 145 mmol/L Final   03/16/2020 136 136 - 145 mmol/L Final      Potassium Potassium   Date Value Ref Range Status   03/18/2020 4.9 3.5 - 5.2 mmol/L Final    03/17/2020 4.6 3.5 - 5.2 mmol/L Final   03/17/2020 4.2 3.5 - 5.2 mmol/L Final   03/16/2020 4.5 3.5 - 5.2 mmol/L Final      Chloride Chloride   Date Value Ref Range Status   03/18/2020 95 (L) 98 - 107 mmol/L Final   03/17/2020 94 (L) 98 - 107 mmol/L Final   03/17/2020 91 (L) 98 - 107 mmol/L Final   03/16/2020 86 (L) 98 - 107 mmol/L Final      Bicarbonate CO2   Date Value Ref Range Status   03/18/2020 30.3 (H) 22.0 - 29.0 mmol/L Final   03/17/2020 26.9 22.0 - 29.0 mmol/L Final   03/17/2020 28.8 22.0 - 29.0 mmol/L Final   03/16/2020 37.2 (H) 22.0 - 29.0 mmol/L Final      BUN BUN   Date Value Ref Range Status   03/18/2020 13 8 - 23 mg/dL Final   03/17/2020 14 8 - 23 mg/dL Final   03/17/2020 12 8 - 23 mg/dL Final   03/16/2020 12 8 - 23 mg/dL Final      Creatinine Creatinine   Date Value Ref Range Status   03/18/2020 0.94 0.76 - 1.27 mg/dL Final   03/17/2020 1.12 0.76 - 1.27 mg/dL Final   03/17/2020 0.95 0.76 - 1.27 mg/dL Final   03/16/2020 0.98 0.76 - 1.27 mg/dL Final      Calcium Calcium   Date Value Ref Range Status   03/18/2020 9.2 8.6 - 10.5 mg/dL Final   03/17/2020 9.2 8.6 - 10.5 mg/dL Final   03/17/2020 8.5 (L) 8.6 - 10.5 mg/dL Final   03/16/2020 9.4 8.6 - 10.5 mg/dL Final      Magnesium Magnesium   Date Value Ref Range Status   03/18/2020 1.9 1.6 - 2.4 mg/dL Final   03/17/2020 1.9 1.6 - 2.4 mg/dL Final            amLODIPine 5 mg Oral Daily   aspirin 81 mg Oral Daily   atorvastatin 80 mg Oral Nightly   budesonide-formoterol 2 puff Inhalation BID - RT   clopidogrel 75 mg Oral Daily   furosemide 40 mg Oral Daily   ipratropium-albuterol 3 mL Nebulization Q4H - RT   lisinopril 40 mg Oral Daily   methylPREDNISolone sodium succinate 40 mg Intravenous Q12H   niCARdipine      rOPINIRole 4 mg Oral Nightly       dexmedetomidine 0.2-1.5 mcg/kg/hr Last Rate: 0.8 mcg/kg/hr (03/17/20 0566)   insulin 0-50 Units/hr Last Rate: Stopped (03/18/20 4395)   lactated ringers 9 mL/hr    niCARdipine 5-15 mg/hr Last Rate: 2.5 mg/hr  (03/17/20 2051)   propofol 5-50 mcg/kg/min Last Rate: Stopped (03/17/20 2252)   sodium chloride 50 mL/hr Last Rate: 50 mL/hr (03/17/20 1449)   sodium chloride 30 mL/hr        Patient Active Problem List   Diagnosis Code   • Aortic valve stenosis I35.0   • Chronic obstructive pulmonary disease (CMS/[facility]) J44.9   • Essential hypertension I10   • Hyperlipidemia E78.5   • SOB (shortness of breath) R06.02   • Overweight E66.3   • Nonrheumatic aortic valve stenosis I35.0   • Diastolic CHF due to valvular disease (CMS/[facility]) I38, I50.30       Assessment & Plan   -Severe aortic stenosis-s/p TAVR POD#1 Db/Lan  -CAD--medical management  -Obesity   -probable sleep apnea  -chronic diastolic heart failure  -current tobacco abuse--nicotine patch ordered  -ETOH 4-5 beers a day--monitor closely for signs of withdrawl  -COPD- pulmonary following  -Diabetes type II- A1C 8.13  -hypertension--BB/ACE  -PAD- s/p popliteal artery stent- plavix restarted   -nocturnal oxygen at home    He looks good this morning sitting up in the chair  Successfully extubated early this morning  Mobilize  Aggresive pulmonary toilet--will add some mucinex  Plavix and ASA restarted  ETOH use--monitor CIWA; will order folic acid, multivitamin, and thiamine  Discussed with Dr. Ace-- to order scheduled ativan for possible withdrawal  IV diurese today  Still on 3L NC  Replace magnesium  Tachycardic overnight with some runs of atrial fibrillation--will add BB  Transfer to CVU    PHONG Persaud  03/18/20  07:18     Beta-blocker restarted.  Diurese.  Mobilize.  Discharge home in 24 to 48 hours if progress continues.

## 2020-03-19 ENCOUNTER — APPOINTMENT (OUTPATIENT)
Dept: GENERAL RADIOLOGY | Facility: HOSPITAL | Age: 70
End: 2020-03-19

## 2020-03-19 LAB
ANION GAP SERPL CALCULATED.3IONS-SCNC: 10.4 MMOL/L (ref 5–15)
BUN BLD-MCNC: 18 MG/DL (ref 8–23)
BUN/CREAT SERPL: 18.8 (ref 7–25)
CALCIUM SPEC-SCNC: 9.5 MG/DL (ref 8.6–10.5)
CHLORIDE SERPL-SCNC: 89 MMOL/L (ref 98–107)
CO2 SERPL-SCNC: 32.6 MMOL/L (ref 22–29)
CREAT BLD-MCNC: 0.96 MG/DL (ref 0.76–1.27)
GFR SERPL CREATININE-BSD FRML MDRD: 78 ML/MIN/1.73
GLUCOSE BLD-MCNC: 197 MG/DL (ref 65–99)
GLUCOSE BLDC GLUCOMTR-MCNC: 196 MG/DL (ref 70–130)
GLUCOSE BLDC GLUCOMTR-MCNC: 285 MG/DL (ref 70–130)
GLUCOSE BLDC GLUCOMTR-MCNC: 368 MG/DL (ref 70–130)
POTASSIUM BLD-SCNC: 4.3 MMOL/L (ref 3.5–5.2)
SODIUM BLD-SCNC: 132 MMOL/L (ref 136–145)

## 2020-03-19 PROCEDURE — 80048 BASIC METABOLIC PNL TOTAL CA: CPT | Performed by: THORACIC SURGERY (CARDIOTHORACIC VASCULAR SURGERY)

## 2020-03-19 PROCEDURE — 63710000001 INSULIN LISPRO (HUMAN) PER 5 UNITS: Performed by: NURSE PRACTITIONER

## 2020-03-19 PROCEDURE — 25010000002 LORAZEPAM PER 2 MG: Performed by: THORACIC SURGERY (CARDIOTHORACIC VASCULAR SURGERY)

## 2020-03-19 PROCEDURE — 25010000002 FUROSEMIDE PER 20 MG: Performed by: INTERNAL MEDICINE

## 2020-03-19 PROCEDURE — 71045 X-RAY EXAM CHEST 1 VIEW: CPT

## 2020-03-19 PROCEDURE — 82962 GLUCOSE BLOOD TEST: CPT

## 2020-03-19 PROCEDURE — 99024 POSTOP FOLLOW-UP VISIT: CPT | Performed by: NURSE PRACTITIONER

## 2020-03-19 PROCEDURE — 94799 UNLISTED PULMONARY SVC/PX: CPT

## 2020-03-19 PROCEDURE — 25010000002 METHYLPREDNISOLONE PER 40 MG: Performed by: INTERNAL MEDICINE

## 2020-03-19 PROCEDURE — 99232 SBSQ HOSP IP/OBS MODERATE 35: CPT | Performed by: INTERNAL MEDICINE

## 2020-03-19 RX ORDER — FUROSEMIDE 10 MG/ML
80 INJECTION INTRAMUSCULAR; INTRAVENOUS EVERY 12 HOURS
Status: DISCONTINUED | OUTPATIENT
Start: 2020-03-19 | End: 2020-03-20

## 2020-03-19 RX ORDER — LISINOPRIL 20 MG/1
20 TABLET ORAL DAILY
Status: DISCONTINUED | OUTPATIENT
Start: 2020-03-20 | End: 2020-03-20 | Stop reason: HOSPADM

## 2020-03-19 RX ORDER — BUMETANIDE 0.25 MG/ML
1 INJECTION INTRAMUSCULAR; INTRAVENOUS ONCE
Status: COMPLETED | OUTPATIENT
Start: 2020-03-19 | End: 2020-03-19

## 2020-03-19 RX ADMIN — METHYLPREDNISOLONE SODIUM SUCCINATE 40 MG: 40 INJECTION, POWDER, FOR SOLUTION INTRAMUSCULAR; INTRAVENOUS at 14:57

## 2020-03-19 RX ADMIN — GUAIFENESIN 600 MG: 600 TABLET, EXTENDED RELEASE ORAL at 20:44

## 2020-03-19 RX ADMIN — METOPROLOL TARTRATE 25 MG: 25 TABLET ORAL at 08:46

## 2020-03-19 RX ADMIN — METHYLPREDNISOLONE SODIUM SUCCINATE 40 MG: 40 INJECTION, POWDER, FOR SOLUTION INTRAMUSCULAR; INTRAVENOUS at 03:19

## 2020-03-19 RX ADMIN — INSULIN LISPRO 8 UNITS: 100 INJECTION, SOLUTION INTRAVENOUS; SUBCUTANEOUS at 20:44

## 2020-03-19 RX ADMIN — ATORVASTATIN CALCIUM 80 MG: 80 TABLET, FILM COATED ORAL at 20:43

## 2020-03-19 RX ADMIN — GUAIFENESIN 600 MG: 600 TABLET, EXTENDED RELEASE ORAL at 08:45

## 2020-03-19 RX ADMIN — FUROSEMIDE 40 MG: 40 TABLET ORAL at 08:46

## 2020-03-19 RX ADMIN — Medication 1 DROP: at 20:43

## 2020-03-19 RX ADMIN — LISINOPRIL 40 MG: 20 TABLET ORAL at 08:46

## 2020-03-19 RX ADMIN — IPRATROPIUM BROMIDE AND ALBUTEROL SULFATE 3 ML: 2.5; .5 SOLUTION RESPIRATORY (INHALATION) at 15:39

## 2020-03-19 RX ADMIN — LORAZEPAM 0.5 MG: 2 INJECTION INTRAMUSCULAR; INTRAVENOUS at 07:06

## 2020-03-19 RX ADMIN — IPRATROPIUM BROMIDE AND ALBUTEROL SULFATE 3 ML: 2.5; .5 SOLUTION RESPIRATORY (INHALATION) at 00:04

## 2020-03-19 RX ADMIN — INSULIN LISPRO 6 UNITS: 100 INJECTION, SOLUTION INTRAVENOUS; SUBCUTANEOUS at 13:17

## 2020-03-19 RX ADMIN — FUROSEMIDE 80 MG: 10 INJECTION, SOLUTION INTRAMUSCULAR; INTRAVENOUS at 14:51

## 2020-03-19 RX ADMIN — IPRATROPIUM BROMIDE AND ALBUTEROL SULFATE 3 ML: 2.5; .5 SOLUTION RESPIRATORY (INHALATION) at 07:01

## 2020-03-19 RX ADMIN — Medication 1 TABLET: at 08:46

## 2020-03-19 RX ADMIN — IPRATROPIUM BROMIDE AND ALBUTEROL SULFATE 3 ML: 2.5; .5 SOLUTION RESPIRATORY (INHALATION) at 23:43

## 2020-03-19 RX ADMIN — IPRATROPIUM BROMIDE AND ALBUTEROL SULFATE 3 ML: 2.5; .5 SOLUTION RESPIRATORY (INHALATION) at 19:43

## 2020-03-19 RX ADMIN — DOCUSATE SODIUM 50MG AND SENNOSIDES 8.6MG 2 TABLET: 8.6; 5 TABLET, FILM COATED ORAL at 20:43

## 2020-03-19 RX ADMIN — INSULIN LISPRO 2 UNITS: 100 INJECTION, SOLUTION INTRAVENOUS; SUBCUTANEOUS at 07:06

## 2020-03-19 RX ADMIN — IPRATROPIUM BROMIDE AND ALBUTEROL SULFATE 3 ML: 2.5; .5 SOLUTION RESPIRATORY (INHALATION) at 10:49

## 2020-03-19 RX ADMIN — LORAZEPAM 0.5 MG: 2 INJECTION INTRAMUSCULAR; INTRAVENOUS at 14:57

## 2020-03-19 RX ADMIN — METOPROLOL TARTRATE 25 MG: 25 TABLET ORAL at 18:54

## 2020-03-19 RX ADMIN — FOLIC ACID 1 MG: 1 TABLET ORAL at 08:46

## 2020-03-19 RX ADMIN — MINERAL OIL AND WHITE PETROLATUM: 150; 830 OINTMENT OPHTHALMIC at 20:43

## 2020-03-19 RX ADMIN — NICOTINE 1 PATCH: 7 PATCH, EXTENDED RELEASE TRANSDERMAL at 08:47

## 2020-03-19 RX ADMIN — ROPINIROLE 4 MG: 2 TABLET, FILM COATED ORAL at 20:43

## 2020-03-19 RX ADMIN — BUMETANIDE 1 MG: 0.25 INJECTION INTRAMUSCULAR; INTRAVENOUS at 13:17

## 2020-03-19 RX ADMIN — LORAZEPAM 0.5 MG: 2 INJECTION INTRAMUSCULAR; INTRAVENOUS at 22:14

## 2020-03-19 RX ADMIN — Medication 100 MG: at 08:46

## 2020-03-19 NOTE — PROGRESS NOTES
"Fili Ladd  1950 69 y.o.  [id number]      Patient Care Team:  Micah Michel MD as PCP - General (Internal Medicine)  Marya Boyd APRN as PCP - Claims Attributed    CC: Severe aortic stenosis, status post TAVR, severe COPD    Interval History: He feels good and would like to go home    Objective   Vital Signs  Temp:  [97.5 °F (36.4 °C)-98.1 °F (36.7 °C)] 98 °F (36.7 °C)  Heart Rate:  [] 107  Resp:  [16-20] 20  BP: (132-153)/(76-90) 153/90    Intake/Output Summary (Last 24 hours) at 3/19/2020 1326  Last data filed at 3/19/2020 0640  Gross per 24 hour   Intake 720 ml   Output 2075 ml   Net -1355 ml     Flowsheet Rows      First Filed Value   Admission Height  172.7 cm (68\") Documented at 03/17/2020 0552   Admission Weight  114 kg (252 lb) Documented at 03/17/2020 0552          Physical Exam:   General Appearance:    Alert,oriented, in no acute distress   Lungs:     Clear to auscultation,BS are equal    Heart:    Normal S1 and S2, RRR without murmur, gallop or rub   HEENT:    Sclerae are clear, no JVD or adenopathy   Abdomen:     Normal bowel sounds, soft non-tender, non-distended, no HSM   Extremities:   Moves all extremities well, no edema, no cyanosis, no             Redness, no rash     Medication Review:        artificial tears  Right Eye Nightly   atorvastatin 80 mg Oral Nightly   budesonide-formoterol 2 puff Inhalation BID - RT   folic acid 1 mg Oral Daily   furosemide 80 mg Intravenous Q12H   guaiFENesin 600 mg Oral Q12H   insulin lispro 0-9 Units Subcutaneous 4x Daily With Meals & Nightly   ipratropium-albuterol 3 mL Nebulization Q4H - RT   [START ON 3/20/2020] lisinopril 20 mg Oral Daily   LORazepam 0.5 mg Intravenous Q8H   methylPREDNISolone sodium succinate 40 mg Intravenous Q12H   metoprolol tartrate 25 mg Oral Q8H   multivitamin 1 tablet Oral Daily   nicotine 1 patch Transdermal Q24H   rOPINIRole 4 mg Oral Nightly   sennosides-docusate 2 tablet Oral Nightly   thiamine 100 mg Oral " Daily       sodium chloride 30 mL/hr         I reviewed the patient's new clinical results.  I personally viewed and interpreted the patient's EKG/Telemetry data    Assessment/Plan  Active Hospital Problems    Diagnosis  POA   • Nonrheumatic aortic valve stenosis [I35.0]  Unknown     Priority: High   • Diastolic CHF due to valvular disease (CMS/HCC) [I38, I50.30]  Unknown      Resolved Hospital Problems   No resolved problems to display.     He looks better but he still has a little bit of pulmonary edema on his chest film.  We are going to diurese him a little harder.  We will get him a little bit of beta-blocker to see if we can slow his rate down we think it will be safe with his COPD hopefully he can go home tomorrow    Brandon Montenegro MD  03/19/20  13:26

## 2020-03-19 NOTE — PROGRESS NOTES
[facility] INPATIENT PROGRESS NOTE         [facility] CARDIOVASC UNIT    3/19/2020      PATIENT IDENTIFICATION:  Name: Fili Ladd ADMIT: 3/17/2020   : 1950  PCP: Micah Michel MD    MRN: [id number] LOS: 2 days   AGE/SEX: 69 y.o. male  ROOM: [facility]                     LOS 2    Reason for visit: Follow-up COPD exacerbation with respiratory failure      SUBJECTIVE:      On 3 L supplemental oxygen which is what he uses at home.  Has nebulized medications and on Symbicort as maintenance therapy at home.  Moderate wheezing on auscultation.  Going in the right direction from my standpoint.  Okay to add metoprolol.    Objective   OBJECTIVE:    Vital Sign Min/Max for last 24 hours  Temp  Min: 97.5 °F (36.4 °C)  Max: 98.1 °F (36.7 °C)   BP  Min: 132/78  Max: 153/90   Pulse  Min: 89  Max: 123   Resp  Min: 16  Max: 20   SpO2  Min: 89 %  Max: 98 %   No data recorded   Weight  Min: 110 kg (242 lb 8 oz)  Max: 110 kg (242 lb 8 oz)                   FiO2 (%): 40 %     Body mass index is 36.87 kg/m².    Intake/Output Summary (Last 24 hours) at 3/19/2020 1250  Last data filed at 3/19/2020 0640  Gross per 24 hour   Intake 720 ml   Output 2075 ml   Net -1355 ml         Exam:  GEN:  No distress, appears stated age  EYES:   PERRL, anicteric sclera  ENT:    External ears/nose normal, OP clear  NECK:  No adenopathy, midline trachea  LUNGS: Normal chest on inspection, palpation and diminished breath sounds on auscultation  CV:  Normal S1S2, without murmur  ABD:  Non tender, non distended, no hepatosplenomegaly, +BS  EXT:  No edema, cyanosis or clubbing    Scheduled meds:      artificial tears  Right Eye Nightly   atorvastatin 80 mg Oral Nightly   budesonide-formoterol 2 puff Inhalation BID - RT   bumetanide 1 mg Intravenous Once   folic acid 1 mg Oral Daily   furosemide 40 mg Oral Daily   guaiFENesin 600 mg Oral Q12H   insulin lispro 0-9 Units Subcutaneous 4x Daily With Meals & Nightly   ipratropium-albuterol 3 mL  Nebulization Q4H - RT   [START ON 3/20/2020] lisinopril 20 mg Oral Daily   LORazepam 0.5 mg Intravenous Q8H   methylPREDNISolone sodium succinate 40 mg Intravenous Q12H   metoprolol tartrate 25 mg Oral Q8H   multivitamin 1 tablet Oral Daily   nicotine 1 patch Transdermal Q24H   rOPINIRole 4 mg Oral Nightly   sennosides-docusate 2 tablet Oral Nightly   thiamine 100 mg Oral Daily     IV meds:                          sodium chloride 30 mL/hr     Data Review:  Results from last 7 days   Lab Units 03/19/20  0432 03/18/20  0423 03/17/20  2324 03/17/20  1123 03/16/20  0703   SODIUM mmol/L 132* 135* 137 135* 136   POTASSIUM mmol/L 4.3 4.9 4.6 4.2 4.5   CHLORIDE mmol/L 89* 95* 94* 91* 86*   CO2 mmol/L 32.6* 30.3* 26.9 28.8 37.2*   BUN mg/dL 18 13 14 12 12   CREATININE mg/dL 0.96 0.94 1.12 0.95 0.98   GLUCOSE mg/dL 197* 134* 221* 174* 155*   CALCIUM mg/dL 9.5 9.2 9.2 8.5* 9.4         Estimated Creatinine Clearance: 88.5 mL/min (by C-G formula based on SCr of 0.96 mg/dL).  Results from last 7 days   Lab Units 03/18/20  0423 03/17/20  1123 03/17/20  0931 03/17/20  0913 03/17/20  0858  03/16/20  0703   WBC 10*3/mm3 9.27 6.89  --   --   --   --  7.66   HEMOGLOBIN g/dL 13.7 12.4*  --   --   --   --  14.4   HEMOGLOBIN, POC g/dL  --   --  13.3 13.6 13.3   < >  --    PLATELETS 10*3/mm3 188 186  --   --   --   --  217    < > = values in this interval not displayed.     Results from last 7 days   Lab Units 03/17/20  1123 03/16/20  0704   INR  1.20* 1.00     Results from last 7 days   Lab Units 03/16/20  0703   ALT (SGPT) U/L 17   AST (SGOT) U/L 22     Results from last 7 days   Lab Units 03/18/20  0826 03/18/20  0552 03/18/20  0106 03/17/20  1500 03/17/20  1105 03/17/20  0931 03/17/20  0913   PH, ARTERIAL pH units 7.346* 7.352 7.373 7.537* 7.365 7.40 7.36   PO2 ART mm Hg 67.5* 64.2* 93.7 58.5* 172.0*  --   --    PCO2, ARTERIAL mm Hg 55.8* 62.0* 53.8* 38.7 57.8*  --   --    HCO3 ART mmol/L 30.5* 34.3* 31.3* 32.9* 33.1*  --   --               2D echo 3/18/2020 reviewed: Grade 2 diastolic dysfunction.  EF 69%    Chest x-ray 3/19/2020 reviewed shows some improvement mildly in pulmonary edema.    )        Active Hospital Problems    Diagnosis  POA   • Diastolic CHF due to valvular disease (CMS/HCC) [I38, I50.30]  Unknown   • Nonrheumatic aortic valve stenosis [I35.0]  Unknown      Resolved Hospital Problems   No resolved problems to display.       Assessment   ASSESSMENT:  S/P TAVR 3/17/20  Postop respiratory failure with vascular edema requiring persistent mechanical ventilation  AECOPD with severe lung disease: FEV1 of 31%  Restrictive lung disease due to obesity  Type 2 diabetes     PLAN:    Continue bronchodilators and steroid with taper for COPD exacerbation.  Has severe lung disease at baseline.  Wean oxygen as able.  Okay to start beta block.      Time spent with coordination of care, discussion with patient and nursing greater than 50% of 35 minutes.    Kendall Novak MD  Pulmonary and Critical Care Medicine  [facility], [facility]  3/19/2020    12:50

## 2020-03-19 NOTE — PROGRESS NOTES
" LOS: 2 days   Patient Care Team:  Micah Michel MD as PCP - General (Internal Medicine)  Marya Boyd APRN as PCP - Claims Attributed    Chief Complaint: post op TAVR    Subjective:  Symptoms:  Improved.  He reports cough and chest pressure.  No shortness of breath, malaise, chest pain, weakness, anorexia or anxiety.    Diet:  Adequate intake.  No nausea or vomiting.    Activity level: Returning to normal.    Pain:  He reports no pain.      Denies any eye pain or changes in vision; says \"it just looks bad\"    Vital Signs  Temp:  [97.5 °F (36.4 °C)-98.1 °F (36.7 °C)] 98 °F (36.7 °C)  Heart Rate:  [] 110  Resp:  [12-20] 18  BP: (132-148)/(69-85) 132/78  Arterial Line BP: (128-154)/(56-65) 154/65  Body mass index is 36.87 kg/m².    Intake/Output Summary (Last 24 hours) at 3/19/2020 0757  Last data filed at 3/19/2020 0640  Gross per 24 hour   Intake 920 ml   Output 2775 ml   Net -1855 ml     No intake/output data recorded.          03/18/20  0600 03/18/20  0843 03/19/20  0653   Weight: 110 kg (242 lb 15.2 oz) 110 kg (243 lb) 110 kg (242 lb 8 oz)       Objective:  General Appearance:  Comfortable and in no acute distress.    Vital signs: (most recent): Blood pressure 153/90, pulse 109, temperature 98 °F (36.7 °C), temperature source Oral, resp. rate 18, height 172.7 cm (68\"), weight 110 kg (242 lb 8 oz), SpO2 90 %.  Vital signs are normal.  No fever.    Output: Producing urine.    HEENT: (Right eye with severe echymosis, improved from yesterday.)    Lungs:  Normal effort and normal respiratory rate.  There are decreased breath sounds and wheezes.    Heart: Tachycardia.  Irregular rhythm.  (ST with frequent PACS/runs of atrial fibrillation)  Chest: Symmetric chest wall expansion. No chest wall tenderness.    Abdomen: Abdomen is soft.  Bowel sounds are normal.     Extremities: There is dependent edema.  (Mild edema bilateral ankles)  Pulses: Distal pulses are intact.    Neurological: Patient is alert and oriented " to person, place and time.  GCS score is 15.    Skin:  Warm and dry.  No rash, ecchymosis, cyanosis or ulceration.       Results Review:      WBC WBC   Date Value Ref Range Status   03/18/2020 9.27 3.40 - 10.80 10*3/mm3 Final   03/17/2020 6.89 3.40 - 10.80 10*3/mm3 Final      HGB Hemoglobin   Date Value Ref Range Status   03/18/2020 13.7 13.0 - 17.7 g/dL Final   03/17/2020 12.4 (L) 13.0 - 17.7 g/dL Final   03/17/2020 13.3 12.0 - 17.0 g/dL Final   03/17/2020 13.6 12.0 - 17.0 g/dL Final   03/17/2020 13.3 12.0 - 17.0 g/dL Final   03/17/2020 14.3 12.0 - 17.0 g/dL Final      HCT Hematocrit   Date Value Ref Range Status   03/18/2020 40.5 37.5 - 51.0 % Final   03/17/2020 37.2 (L) 37.5 - 51.0 % Final   03/17/2020 39 38 - 51 % Final   03/17/2020 40 38 - 51 % Final   03/17/2020 39 38 - 51 % Final   03/17/2020 42 38 - 51 % Final      Platelets Platelets   Date Value Ref Range Status   03/18/2020 188 140 - 450 10*3/mm3 Final   03/17/2020 186 140 - 450 10*3/mm3 Final        PT/INR:    Protime   Date Value Ref Range Status   03/17/2020 15.0 (H) 11.7 - 14.2 Seconds Final   /  INR   Date Value Ref Range Status   03/17/2020 1.20 (H) 0.90 - 1.10 Final       Sodium Sodium   Date Value Ref Range Status   03/19/2020 132 (L) 136 - 145 mmol/L Final   03/18/2020 135 (L) 136 - 145 mmol/L Final   03/17/2020 137 136 - 145 mmol/L Final   03/17/2020 135 (L) 136 - 145 mmol/L Final      Potassium Potassium   Date Value Ref Range Status   03/19/2020 4.3 3.5 - 5.2 mmol/L Final   03/18/2020 4.9 3.5 - 5.2 mmol/L Final   03/17/2020 4.6 3.5 - 5.2 mmol/L Final   03/17/2020 4.2 3.5 - 5.2 mmol/L Final      Chloride Chloride   Date Value Ref Range Status   03/19/2020 89 (L) 98 - 107 mmol/L Final   03/18/2020 95 (L) 98 - 107 mmol/L Final   03/17/2020 94 (L) 98 - 107 mmol/L Final   03/17/2020 91 (L) 98 - 107 mmol/L Final      Bicarbonate CO2   Date Value Ref Range Status   03/19/2020 32.6 (H) 22.0 - 29.0 mmol/L Final   03/18/2020 30.3 (H) 22.0 - 29.0 mmol/L  Final   03/17/2020 26.9 22.0 - 29.0 mmol/L Final   03/17/2020 28.8 22.0 - 29.0 mmol/L Final      BUN BUN   Date Value Ref Range Status   03/19/2020 18 8 - 23 mg/dL Final   03/18/2020 13 8 - 23 mg/dL Final   03/17/2020 14 8 - 23 mg/dL Final   03/17/2020 12 8 - 23 mg/dL Final      Creatinine Creatinine   Date Value Ref Range Status   03/19/2020 0.96 0.76 - 1.27 mg/dL Final   03/18/2020 0.94 0.76 - 1.27 mg/dL Final   03/17/2020 1.12 0.76 - 1.27 mg/dL Final   03/17/2020 0.95 0.76 - 1.27 mg/dL Final      Calcium Calcium   Date Value Ref Range Status   03/19/2020 9.5 8.6 - 10.5 mg/dL Final   03/18/2020 9.2 8.6 - 10.5 mg/dL Final   03/17/2020 9.2 8.6 - 10.5 mg/dL Final   03/17/2020 8.5 (L) 8.6 - 10.5 mg/dL Final      Magnesium Magnesium   Date Value Ref Range Status   03/18/2020 1.9 1.6 - 2.4 mg/dL Final   03/17/2020 1.9 1.6 - 2.4 mg/dL Final            artificial tears  Right Eye Nightly   atorvastatin 80 mg Oral Nightly   budesonide-formoterol 2 puff Inhalation BID - RT   folic acid 1 mg Oral Daily   furosemide 40 mg Oral Daily   guaiFENesin 600 mg Oral Q12H   insulin lispro 0-9 Units Subcutaneous 4x Daily With Meals & Nightly   ipratropium-albuterol 3 mL Nebulization Q4H - RT   lisinopril 40 mg Oral Daily   LORazepam 0.5 mg Intravenous Q8H   methylPREDNISolone sodium succinate 40 mg Intravenous Q12H   metoprolol tartrate 25 mg Oral Q12H   multivitamin 1 tablet Oral Daily   nicotine 1 patch Transdermal Q24H   rOPINIRole 4 mg Oral Nightly   sennosides-docusate 2 tablet Oral Nightly   thiamine 100 mg Oral Daily       sodium chloride 30 mL/hr       Patient Active Problem List   Diagnosis Code   • Aortic valve stenosis I35.0   • Chronic obstructive pulmonary disease (CMS/[facility]) J44.9   • Essential hypertension I10   • Hyperlipidemia E78.5   • SOB (shortness of breath) R06.02   • Overweight E66.3   • Nonrheumatic aortic valve stenosis I35.0   • Diastolic CHF due to valvular disease (CMS/HCC) I38, I50.30       Assessment & Plan    -Severe aortic stenosis-s/p TAVR POD#2 Db/Lan  -CAD--medical management  -Obesity   -probable sleep apnea  -chronic diastolic heart failure  -current tobacco abuse--nicotine patch ordered  -ETOH 4-5 beers a day--monitor closely for signs of withdrawl  -COPD- pulmonary following; nebulizer treatments/IV steroids  -Diabetes type II- A1C 8.13  -hypertension--BB/ACE  -PAD- s/p popliteal artery stent   -nocturnal oxygen at home  -subconjunctival hemorrhage- drops per opthalmology     Plavix and ASA on hold for now d/t acute TERESA  Mobilize  Encourage pulmonary toilet  Excellent response to IV diuretic yesterday-- will give and extra dose of Bumex today  ETOH use--monitor CIWA; schedule ativan  RN had to increase oxygen to 5L this morning d/t sats in low 80s--wears 3L at home; will repeat CXR  Still with some intermittent a.fib/sinus tachycardia--increase BB to q8  Discontinue andrei  Will discuss timing of discharge with Dr. Db Stout, APRN  03/19/20  07:57     Making reasonable progress.  CV stable,increase B Blocker to better control HR.  R eye improved. ASA and Plavix disconitnued.

## 2020-03-19 NOTE — PROGRESS NOTES
"Fili Ladd  1950 69 y.o.  [id number]      Patient Care Team:  Micah Michel MD as PCP - General (Internal Medicine)  Marya Boyd APRN as PCP - Claims Attributed    CC: Severe aortic stenosis, status post TAVR, severe COPD    Interval History: He feels good and would like to go home    Objective   Vital Signs  Temp:  [97.5 °F (36.4 °C)-98.1 °F (36.7 °C)] 98 °F (36.7 °C)  Heart Rate:  [] 107  Resp:  [16-20] 20  BP: (132-153)/(76-90) 153/90    Intake/Output Summary (Last 24 hours) at 3/19/2020 1331  Last data filed at 3/19/2020 0640  Gross per 24 hour   Intake 720 ml   Output 2075 ml   Net -1355 ml     Flowsheet Rows      First Filed Value   Admission Height  172.7 cm (68\") Documented at 03/17/2020 0552   Admission Weight  114 kg (252 lb) Documented at 03/17/2020 0552          Physical Exam:   General Appearance:    Alert,oriented, in no acute distress   Lungs:     Clear to auscultation,BS are equal    Heart:    Normal S1 and S2, RRR without murmur, gallop or rub   HEENT:    Sclerae are clear, no JVD or adenopathy   Abdomen:     Normal bowel sounds, soft non-tender, non-distended, no HSM   Extremities:   Moves all extremities well, no edema, no cyanosis, no             Redness, no rash     Medication Review:        artificial tears  Right Eye Nightly   atorvastatin 80 mg Oral Nightly   budesonide-formoterol 2 puff Inhalation BID - RT   folic acid 1 mg Oral Daily   furosemide 80 mg Intravenous Q12H   guaiFENesin 600 mg Oral Q12H   insulin lispro 0-9 Units Subcutaneous 4x Daily With Meals & Nightly   ipratropium-albuterol 3 mL Nebulization Q4H - RT   [START ON 3/20/2020] lisinopril 20 mg Oral Daily   LORazepam 0.5 mg Intravenous Q8H   methylPREDNISolone sodium succinate 40 mg Intravenous Q12H   metoprolol tartrate 25 mg Oral Q8H   multivitamin 1 tablet Oral Daily   nicotine 1 patch Transdermal Q24H   rOPINIRole 4 mg Oral Nightly   sennosides-docusate 2 tablet Oral Nightly   thiamine 100 mg Oral " Daily       sodium chloride 30 mL/hr         I reviewed the patient's new clinical results.  I personally viewed and interpreted the patient's EKG/Telemetry data    Assessment/Plan  Active Hospital Problems    Diagnosis  POA   • Nonrheumatic aortic valve stenosis [I35.0]  Unknown     Priority: High   • Diastolic CHF due to valvular disease (CMS/HCC) [I38, I50.30]  Unknown      Resolved Hospital Problems   No resolved problems to display.     He looks better but he still has a little bit of pulmonary edema on his chest film.  We are going to diurese him a little harder.  We will get him a little bit of beta-blocker to see if we can slow his rate down we think it will be safe with his COPD hopefully he can go home tomorrow    Brandon Montenegro MD  03/19/20  13:31

## 2020-03-20 ENCOUNTER — TELEPHONE (OUTPATIENT)
Dept: CARDIAC SURGERY | Facility: CLINIC | Age: 70
End: 2020-03-20

## 2020-03-20 VITALS
OXYGEN SATURATION: 95 % | SYSTOLIC BLOOD PRESSURE: 139 MMHG | DIASTOLIC BLOOD PRESSURE: 79 MMHG | HEIGHT: 68 IN | TEMPERATURE: 97.8 F | RESPIRATION RATE: 18 BRPM | BODY MASS INDEX: 36.77 KG/M2 | HEART RATE: 116 BPM | WEIGHT: 242.6 LBS

## 2020-03-20 LAB
ANION GAP SERPL CALCULATED.3IONS-SCNC: 13.8 MMOL/L (ref 5–15)
BUN BLD-MCNC: 27 MG/DL (ref 8–23)
BUN/CREAT SERPL: 23.1 (ref 7–25)
CALCIUM SPEC-SCNC: 9.6 MG/DL (ref 8.6–10.5)
CHLORIDE SERPL-SCNC: 88 MMOL/L (ref 98–107)
CO2 SERPL-SCNC: 32.2 MMOL/L (ref 22–29)
CREAT BLD-MCNC: 1.17 MG/DL (ref 0.76–1.27)
DEPRECATED RDW RBC AUTO: 40.3 FL (ref 37–54)
ERYTHROCYTE [DISTWIDTH] IN BLOOD BY AUTOMATED COUNT: 13.1 % (ref 12.3–15.4)
GFR SERPL CREATININE-BSD FRML MDRD: 62 ML/MIN/1.73
GLUCOSE BLD-MCNC: 228 MG/DL (ref 65–99)
GLUCOSE BLDC GLUCOMTR-MCNC: 249 MG/DL (ref 70–130)
GLUCOSE BLDC GLUCOMTR-MCNC: 303 MG/DL (ref 70–130)
HCT VFR BLD AUTO: 43.6 % (ref 37.5–51)
HGB BLD-MCNC: 14.4 G/DL (ref 13–17.7)
MAGNESIUM SERPL-MCNC: 2.1 MG/DL (ref 1.6–2.4)
MCH RBC QN AUTO: 28.5 PG (ref 26.6–33)
MCHC RBC AUTO-ENTMCNC: 33 G/DL (ref 31.5–35.7)
MCV RBC AUTO: 86.2 FL (ref 79–97)
PLATELET # BLD AUTO: 235 10*3/MM3 (ref 140–450)
PMV BLD AUTO: 9.6 FL (ref 6–12)
POTASSIUM BLD-SCNC: 4.1 MMOL/L (ref 3.5–5.2)
RBC # BLD AUTO: 5.06 10*6/MM3 (ref 4.14–5.8)
SODIUM BLD-SCNC: 134 MMOL/L (ref 136–145)
WBC NRBC COR # BLD: 17.06 10*3/MM3 (ref 3.4–10.8)

## 2020-03-20 PROCEDURE — 99024 POSTOP FOLLOW-UP VISIT: CPT | Performed by: NURSE PRACTITIONER

## 2020-03-20 PROCEDURE — 25010000002 FUROSEMIDE PER 20 MG: Performed by: INTERNAL MEDICINE

## 2020-03-20 PROCEDURE — 85027 COMPLETE CBC AUTOMATED: CPT | Performed by: NURSE PRACTITIONER

## 2020-03-20 PROCEDURE — 63710000001 INSULIN LISPRO (HUMAN) PER 5 UNITS: Performed by: NURSE PRACTITIONER

## 2020-03-20 PROCEDURE — 83735 ASSAY OF MAGNESIUM: CPT | Performed by: THORACIC SURGERY (CARDIOTHORACIC VASCULAR SURGERY)

## 2020-03-20 PROCEDURE — 25010000002 METHYLPREDNISOLONE PER 40 MG: Performed by: INTERNAL MEDICINE

## 2020-03-20 PROCEDURE — 99232 SBSQ HOSP IP/OBS MODERATE 35: CPT | Performed by: INTERNAL MEDICINE

## 2020-03-20 PROCEDURE — 94799 UNLISTED PULMONARY SVC/PX: CPT

## 2020-03-20 PROCEDURE — 82962 GLUCOSE BLOOD TEST: CPT

## 2020-03-20 PROCEDURE — 80048 BASIC METABOLIC PNL TOTAL CA: CPT | Performed by: THORACIC SURGERY (CARDIOTHORACIC VASCULAR SURGERY)

## 2020-03-20 RX ORDER — PREDNISONE 10 MG/1
TABLET ORAL
Qty: 31 TABLET | Refills: 0 | Status: SHIPPED | OUTPATIENT
Start: 2020-03-20 | End: 2021-03-16 | Stop reason: ALTCHOICE

## 2020-03-20 RX ORDER — GUAIFENESIN 600 MG/1
600 TABLET, EXTENDED RELEASE ORAL EVERY 12 HOURS SCHEDULED
Qty: 60 TABLET | Refills: 1 | Status: SHIPPED | OUTPATIENT
Start: 2020-03-20 | End: 2020-05-19

## 2020-03-20 RX ORDER — BENZONATATE 100 MG/1
100 CAPSULE ORAL 3 TIMES DAILY PRN
Qty: 90 CAPSULE | Refills: 0 | Status: SHIPPED | OUTPATIENT
Start: 2020-03-20 | End: 2020-04-19

## 2020-03-20 RX ORDER — METHYLPREDNISOLONE 4 MG/1
TABLET ORAL
Qty: 21 EACH | Refills: 0 | Status: SHIPPED | OUTPATIENT
Start: 2020-03-20 | End: 2021-03-16 | Stop reason: ALTCHOICE

## 2020-03-20 RX ORDER — FUROSEMIDE 40 MG/1
40 TABLET ORAL DAILY
Status: DISCONTINUED | OUTPATIENT
Start: 2020-03-21 | End: 2020-03-20 | Stop reason: HOSPADM

## 2020-03-20 RX ORDER — LISINOPRIL 20 MG/1
20 TABLET ORAL DAILY
Qty: 30 TABLET | Refills: 1 | Status: SHIPPED | OUTPATIENT
Start: 2020-03-20 | End: 2020-03-25

## 2020-03-20 RX ADMIN — LISINOPRIL 20 MG: 20 TABLET ORAL at 09:42

## 2020-03-20 RX ADMIN — FUROSEMIDE 80 MG: 10 INJECTION, SOLUTION INTRAMUSCULAR; INTRAVENOUS at 02:59

## 2020-03-20 RX ADMIN — INSULIN LISPRO 7 UNITS: 100 INJECTION, SOLUTION INTRAVENOUS; SUBCUTANEOUS at 07:01

## 2020-03-20 RX ADMIN — METOPROLOL TARTRATE 25 MG: 25 TABLET ORAL at 02:59

## 2020-03-20 RX ADMIN — INSULIN LISPRO 4 UNITS: 100 INJECTION, SOLUTION INTRAVENOUS; SUBCUTANEOUS at 11:07

## 2020-03-20 RX ADMIN — NICOTINE 1 PATCH: 7 PATCH, EXTENDED RELEASE TRANSDERMAL at 09:43

## 2020-03-20 RX ADMIN — Medication 100 MG: at 09:42

## 2020-03-20 RX ADMIN — GUAIFENESIN 600 MG: 600 TABLET, EXTENDED RELEASE ORAL at 09:42

## 2020-03-20 RX ADMIN — METHYLPREDNISOLONE SODIUM SUCCINATE 40 MG: 40 INJECTION, POWDER, FOR SOLUTION INTRAMUSCULAR; INTRAVENOUS at 02:59

## 2020-03-20 RX ADMIN — METOPROLOL TARTRATE 25 MG: 25 TABLET ORAL at 11:07

## 2020-03-20 RX ADMIN — FOLIC ACID 1 MG: 1 TABLET ORAL at 09:42

## 2020-03-20 RX ADMIN — IPRATROPIUM BROMIDE AND ALBUTEROL SULFATE 3 ML: 2.5; .5 SOLUTION RESPIRATORY (INHALATION) at 08:14

## 2020-03-20 RX ADMIN — Medication 1 TABLET: at 09:42

## 2020-03-20 NOTE — PROGRESS NOTES
"Fili Ladd  1950 69 y.o.  [id number]      Patient Care Team:  Micah Michel MD as PCP - General (Internal Medicine)  Marya Boyd APRN as PCP - Claims Attributed    CC: Severe aortic stenosis, status post TAVR, severe COPD    Interval History: He feels good and would like to go home    Objective   Vital Signs  Temp:  [97.6 °F (36.4 °C)-97.8 °F (36.6 °C)] 97.8 °F (36.6 °C)  Heart Rate:  [] 116  Resp:  [16-18] 18  BP: (126-154)/(60-82) 139/79    Intake/Output Summary (Last 24 hours) at 3/20/2020 1208  Last data filed at 3/20/2020 0735  Gross per 24 hour   Intake 480 ml   Output 1275 ml   Net -795 ml     Flowsheet Rows      First Filed Value   Admission Height  172.7 cm (68\") Documented at 03/17/2020 0552   Admission Weight  114 kg (252 lb) Documented at 03/17/2020 0552          Physical Exam:   General Appearance:    Alert,oriented, in no acute distress   Lungs:     Clear to auscultation,BS are equal    Heart:    Normal S1 and S2, RRR without murmur, gallop or rub   HEENT:    Sclerae are clear, no JVD or adenopathy   Abdomen:     Normal bowel sounds, soft non-tender, non-distended, no HSM   Extremities:   Moves all extremities well, no edema, no cyanosis, no             Redness, no rash     Medication Review:        artificial tears  Right Eye Nightly   atorvastatin 80 mg Oral Nightly   budesonide-formoterol 2 puff Inhalation BID - RT   folic acid 1 mg Oral Daily   [START ON 3/21/2020] furosemide 40 mg Oral Daily   guaiFENesin 600 mg Oral Q12H   insulin lispro 0-9 Units Subcutaneous 4x Daily With Meals & Nightly   ipratropium-albuterol 3 mL Nebulization Q4H - RT   lisinopril 20 mg Oral Daily   LORazepam 0.5 mg Intravenous Q8H   methylPREDNISolone sodium succinate 40 mg Intravenous Q12H   metoprolol tartrate 25 mg Oral Q8H   multivitamin 1 tablet Oral Daily   nicotine 1 patch Transdermal Q24H   rOPINIRole 4 mg Oral Nightly   sennosides-docusate 2 tablet Oral Nightly   thiamine 100 mg Oral Daily "       sodium chloride 30 mL/hr         I reviewed the patient's new clinical results.  I personally viewed and interpreted the patient's EKG/Telemetry data    Assessment/Plan  Active Hospital Problems    Diagnosis  POA   • Nonrheumatic aortic valve stenosis [I35.0]  Unknown     Priority: High   • Diastolic CHF due to valvular disease (CMS/HCC) [I38, I50.30]  Unknown      Resolved Hospital Problems   No resolved problems to display.     He is on chronic O2 at home has COPD he feels good would like to go home his heart rate is a little quick but it is always been quick he has a lot of ectopy APCs PVCs we have him on a little bit of beta-blocker I do think he is a sidra that definitely needs to be seen next week even though her try not to do that he is pretty fragile  And then we will see him back in a month also  Brandon Montenegro MD  03/20/20  12:08

## 2020-03-20 NOTE — TELEPHONE ENCOUNTER
I spoke with Paula at [facility] and asked her to disregard the order for a medrol dose pack. Patient has already received a prescription for a tapering dose of prednisone from Dr Novak.

## 2020-03-20 NOTE — DISCHARGE SUMMARY
Date of Admission:   Date of Discharge:  3/20/2020    Discharge Diagnosis:  -Severe aortic stenosis-s/p TAVR Db/Lan  -CAD--medical management  -Obesity   -probable sleep apnea  -chronic diastolic heart failure  -current tobacco abuse--nicotine patch ordered  -ETOH 4-5 beers a day--monitor closely for signs of withdrawl  -COPD- pulmonary following; nebulizer treatments/IV steroids  -Diabetes type II- A1C 8.13  -hypertension--BB/ACE  -PAD- s/p popliteal artery stent   -nocturnal oxygen at home  -subconjunctival hemorrhage- drops per ophthalmology  -Leukocytosis--IV steroid administration    Presenting Problem/History of Present Illness  Nonrheumatic aortic valve stenosis [I35.0]  Diastolic CHF due to valvular disease (CMS/[facility]) [I38, I50.30]  Nonrheumatic aortic valve stenosis [I35.0]     Hospital Course  Patient is a 69 y.o. male presented for TAVR.  Pulmonary was consulted for severe COPD, he was treated for exacerbation with IV steroids, which was transitioned to PO steroids per pulmonary. He coughed so hard he had subconjunctival hemorrhage which ophthalmology was consulted.  His plavix and ASA were held. plavix was okay to continue.  He will need to follow up with ophthalmology in 1 month.  He was tachycardic, his medications were adjusted. On POD 3 he was deemed ready for discharge home.  He will follow up with Dr. Montenegro's office in 1 week and again in 1 month with repeat echo.  He will need to follow up with Ophthalmology in 1 month [phone].    Procedures Performed  Procedure(s):  TTE TRANSFEMORAL TRANSCATHETER AORTIC VALVE REPLACEMENT PERCUTANEOUS APPROACH  Transfemoral Transcatheter Aortic Valve Replacement w/Intra op TTE and possible Open Rescue Surgery       Consults:   Consults     Date and Time Order Name Status Description    3/18/2020 1353 Inpatient Ophthalmology Consult Completed     3/18/2020 1214 Inpatient Ophthalmology Consult      3/17/2020 0950 Inpatient Pulmonology Consult Completed      3/5/2020 1107 Inpatient Internal Medicine Consult Completed     3/4/2020 1427 Inpatient Pulmonology Consult Completed           Pertinent Test Results:    Lab Results   Component Value Date    WBC 17.06 (H) 03/20/2020    HGB 14.4 03/20/2020    HCT 43.6 03/20/2020    MCV 86.2 03/20/2020     03/20/2020      Lab Results   Component Value Date    GLUCOSE 228 (H) 03/20/2020    CALCIUM 9.6 03/20/2020     (L) 03/20/2020    K 4.1 03/20/2020    CO2 32.2 (H) 03/20/2020    CL 88 (L) 03/20/2020    BUN 27 (H) 03/20/2020    CREATININE 1.17 03/20/2020    EGFRIFNONA 62 03/20/2020    BCR 23.1 03/20/2020    ANIONGAP 13.8 03/20/2020     Lab Results   Component Value Date    INR 1.20 (H) 03/17/2020    PROTIME 15.0 (H) 03/17/2020         Condition on Discharge: stable    Vital Signs  Temp:  [97.5 °F (36.4 °C)-97.8 °F (36.6 °C)] 97.8 °F (36.6 °C)  Heart Rate:  [] 116  Resp:  [16-20] 18  BP: (126-154)/(60-82) 126/82      Discharge Disposition      Discharge Medications     Discharge Medications      New Medications      Instructions Start Date   artificial tears ophthalmic ointment   1 application, Right Eye, Nightly      Glycerin-Hypromellose- 0.2-0.2-1 % solution ophthalmic solution  Commonly known as:  ARTIFICIAL TEARS   1 drop, Right Eye, Every 1 Hour PRN      guaiFENesin 600 MG 12 hr tablet  Commonly known as:  MUCINEX   600 mg, Oral, Every 12 Hours Scheduled      metoprolol tartrate 25 MG tablet  Commonly known as:  LOPRESSOR   25 mg, Oral, Every 8 Hours      nicotine 7 MG/24HR patch  Commonly known as:  NICODERM CQ   1 patch, Transdermal, Every 24 Hours Scheduled         Changes to Medications      Instructions Start Date   lisinopril 20 MG tablet  Commonly known as:  PRINIVIL,ZESTRIL  What changed:    · medication strength  · how much to take   20 mg, Oral, Daily         Continue These Medications      Instructions Start Date   albuterol 1.25 MG/3ML nebulizer solution  Commonly known as:  ACCUNEB   1  ampule, Nebulization, 4 Times Daily      albuterol sulfate  (90 Base) MCG/ACT inhaler  Commonly known as:  PROVENTIL HFA;VENTOLIN HFA;PROAIR HFA   2 puffs, Inhalation, Every 4 Hours PRN      aspirin 81 MG EC tablet   81 mg, Oral, Daily      atorvastatin 80 MG tablet  Commonly known as:  LIPITOR   80 mg, Oral, Nightly      budesonide-formoterol 160-4.5 MCG/ACT inhaler  Commonly known as:  SYMBICORT   2 puffs, Inhalation, 2 Times Daily - RT      furosemide 40 MG tablet  Commonly known as:  LASIX   40 mg, Oral, Daily      rOPINIRole 4 MG tablet  Commonly known as:  REQUIP   4 mg, Oral, Nightly      tiotropium 18 MCG per inhalation capsule  Commonly known as:  SPIRIVA   1 capsule, Inhalation, Daily - RT         Stop These Medications    amLODIPine 5 MG tablet  Commonly known as:  NORVASC     mupirocin 2 % ointment  Commonly known as:  BACTROBAN        ASK your doctor about these medications      Instructions Start Date   clopidogrel 75 MG tablet  Commonly known as:  PLAVIX   75 mg, Oral, Daily, STATES IS STILL TAKING CURRENTLY.      PERIDEX MT   Mouth/Throat, AS DIRECTED              Discharge Diet:     Activity at Discharge:   Activity Instructions     Activity as Tolerated      Measure Weight      Daily weight, notify if over 3 lbs in one day          Follow-up Appointments  Future Appointments   Date Time Provider Department Center   3/23/2020 12:30 PM Paulino Mariee MD [facility] None   3/25/2020 11:00 AM Jennifer West APRN [facility] None   4/16/2020 11:00 AM  [facility]   4/28/2020  2:00 PM [facility]   4/28/2020  2:40 PM Brandon Montenegro MD [facility] None     Ophthalmology in 1 month [phone].        Additional Instructions for the Follow-ups that You Need to Schedule     Discharge Follow-up with PCP   As directed       Currently Documented PCP:    Micah Michel MD    PCP Phone Number:    [phone]     Follow Up Details:  1  week         Discharge Follow-up with Specialty: Dr. Montenegro; 1 Month   As directed      Specialty:  Dr. Montenegro    Follow Up:  1 Month               Test Results Pending at Discharge       PHONG Corbin  03/20/20  08:58     Doing well.  Short-term follow-up because of right eye.

## 2020-03-20 NOTE — PROGRESS NOTES
" LOS: 3 days   Patient Care Team:  Micah Michel MD as PCP - General (Internal Medicine)  Marya Boyd APRN as PCP - Claims Attributed    Chief Complaint: post op    Subjective:  Symptoms:  No shortness of breath, cough or chest pain.    Diet:  Adequate intake.  No nausea or vomiting.    Activity level: Returning to normal.      No new c/o this morning--wants to go home    Vital Signs  Temp:  [97.5 °F (36.4 °C)-97.8 °F (36.6 °C)] 97.6 °F (36.4 °C)  Heart Rate:  [] 138  Resp:  [16-20] 18  BP: (129-154)/(60-77) 154/72  Body mass index is 36.89 kg/m².    Intake/Output Summary (Last 24 hours) at 3/20/2020 0749  Last data filed at 3/20/2020 0612  Gross per 24 hour   Intake 240 ml   Output 1275 ml   Net -1035 ml     No intake/output data recorded.          03/18/20  0843 03/19/20  0653 03/20/20  0515   Weight: 110 kg (243 lb) 110 kg (242 lb 8 oz) 110 kg (242 lb 9.6 oz)     Objective:  General Appearance:  Comfortable and in no acute distress.    Vital signs: (most recent): Blood pressure 154/72, pulse (!) 138, temperature 97.6 °F (36.4 °C), temperature source Oral, resp. rate 18, height 172.7 cm (68\"), weight 110 kg (242 lb 9.6 oz), SpO2 93 %.  Vital signs are normal.  No fever.    Output: Producing urine.    Lungs:  Normal effort and normal respiratory rate.  There are decreased breath sounds and wheezes.    Heart: Tachycardia.  Irregular rhythm.  (ST with frequent PACs/PVCs)  Abdomen: Abdomen is soft and distended.  Bowel sounds are normal.     Extremities: There is dependent edema.  (Trace edema bilateral ankles and feet)  Pulses: Distal pulses are intact.    Neurological: Patient is alert and oriented to person, place and time.    Skin:  Warm and dry.  (Bilateral groin sites clean, dry, and intact)        Results Review:        WBC WBC   Date Value Ref Range Status   03/20/2020 17.06 (H) 3.40 - 10.80 10*3/mm3 Final   03/18/2020 9.27 3.40 - 10.80 10*3/mm3 Final   03/17/2020 6.89 3.40 - 10.80 10*3/mm3 Final    "   HGB Hemoglobin   Date Value Ref Range Status   03/20/2020 14.4 13.0 - 17.7 g/dL Final   03/18/2020 13.7 13.0 - 17.7 g/dL Final   03/17/2020 12.4 (L) 13.0 - 17.7 g/dL Final   03/17/2020 13.3 12.0 - 17.0 g/dL Final   03/17/2020 13.6 12.0 - 17.0 g/dL Final   03/17/2020 13.3 12.0 - 17.0 g/dL Final      HCT Hematocrit   Date Value Ref Range Status   03/20/2020 43.6 37.5 - 51.0 % Final   03/18/2020 40.5 37.5 - 51.0 % Final   03/17/2020 37.2 (L) 37.5 - 51.0 % Final   03/17/2020 39 38 - 51 % Final   03/17/2020 40 38 - 51 % Final   03/17/2020 39 38 - 51 % Final      Platelets Platelets   Date Value Ref Range Status   03/20/2020 235 140 - 450 10*3/mm3 Final   03/18/2020 188 140 - 450 10*3/mm3 Final   03/17/2020 186 140 - 450 10*3/mm3 Final        PT/INR:    Protime   Date Value Ref Range Status   03/17/2020 15.0 (H) 11.7 - 14.2 Seconds Final   /  INR   Date Value Ref Range Status   03/17/2020 1.20 (H) 0.90 - 1.10 Final       Sodium Sodium   Date Value Ref Range Status   03/20/2020 134 (L) 136 - 145 mmol/L Final   03/19/2020 132 (L) 136 - 145 mmol/L Final   03/18/2020 135 (L) 136 - 145 mmol/L Final   03/17/2020 137 136 - 145 mmol/L Final   03/17/2020 135 (L) 136 - 145 mmol/L Final      Potassium Potassium   Date Value Ref Range Status   03/20/2020 4.1 3.5 - 5.2 mmol/L Final   03/19/2020 4.3 3.5 - 5.2 mmol/L Final   03/18/2020 4.9 3.5 - 5.2 mmol/L Final   03/17/2020 4.6 3.5 - 5.2 mmol/L Final   03/17/2020 4.2 3.5 - 5.2 mmol/L Final      Chloride Chloride   Date Value Ref Range Status   03/20/2020 88 (L) 98 - 107 mmol/L Final   03/19/2020 89 (L) 98 - 107 mmol/L Final   03/18/2020 95 (L) 98 - 107 mmol/L Final   03/17/2020 94 (L) 98 - 107 mmol/L Final   03/17/2020 91 (L) 98 - 107 mmol/L Final      Bicarbonate CO2   Date Value Ref Range Status   03/20/2020 32.2 (H) 22.0 - 29.0 mmol/L Final   03/19/2020 32.6 (H) 22.0 - 29.0 mmol/L Final   03/18/2020 30.3 (H) 22.0 - 29.0 mmol/L Final   03/17/2020 26.9 22.0 - 29.0 mmol/L Final    03/17/2020 28.8 22.0 - 29.0 mmol/L Final      BUN BUN   Date Value Ref Range Status   03/20/2020 27 (H) 8 - 23 mg/dL Final   03/19/2020 18 8 - 23 mg/dL Final   03/18/2020 13 8 - 23 mg/dL Final   03/17/2020 14 8 - 23 mg/dL Final   03/17/2020 12 8 - 23 mg/dL Final      Creatinine Creatinine   Date Value Ref Range Status   03/20/2020 1.17 0.76 - 1.27 mg/dL Final   03/19/2020 0.96 0.76 - 1.27 mg/dL Final   03/18/2020 0.94 0.76 - 1.27 mg/dL Final   03/17/2020 1.12 0.76 - 1.27 mg/dL Final   03/17/2020 0.95 0.76 - 1.27 mg/dL Final      Calcium Calcium   Date Value Ref Range Status   03/20/2020 9.6 8.6 - 10.5 mg/dL Final   03/19/2020 9.5 8.6 - 10.5 mg/dL Final   03/18/2020 9.2 8.6 - 10.5 mg/dL Final   03/17/2020 9.2 8.6 - 10.5 mg/dL Final   03/17/2020 8.5 (L) 8.6 - 10.5 mg/dL Final      Magnesium Magnesium   Date Value Ref Range Status   03/20/2020 2.1 1.6 - 2.4 mg/dL Final   03/18/2020 1.9 1.6 - 2.4 mg/dL Final   03/17/2020 1.9 1.6 - 2.4 mg/dL Final            artificial tears  Right Eye Nightly   atorvastatin 80 mg Oral Nightly   budesonide-formoterol 2 puff Inhalation BID - RT   folic acid 1 mg Oral Daily   furosemide 80 mg Intravenous Q12H   guaiFENesin 600 mg Oral Q12H   insulin lispro 0-9 Units Subcutaneous 4x Daily With Meals & Nightly   ipratropium-albuterol 3 mL Nebulization Q4H - RT   lisinopril 20 mg Oral Daily   LORazepam 0.5 mg Intravenous Q8H   methylPREDNISolone sodium succinate 40 mg Intravenous Q12H   metoprolol tartrate 25 mg Oral Q8H   multivitamin 1 tablet Oral Daily   nicotine 1 patch Transdermal Q24H   rOPINIRole 4 mg Oral Nightly   sennosides-docusate 2 tablet Oral Nightly   thiamine 100 mg Oral Daily       sodium chloride 30 mL/hr       Patient Active Problem List   Diagnosis Code   • Aortic valve stenosis I35.0   • Chronic obstructive pulmonary disease (CMS/[facility]) J44.9   • Essential hypertension I10   • Hyperlipidemia E78.5   • SOB (shortness of breath) R06.02   • Overweight E66.3   • Nonrheumatic  aortic valve stenosis I35.0   • Diastolic CHF due to valvular disease (CMS/[facility]) I38, I50.30       Assessment & Plan   -Severe aortic stenosis-s/p TAVR POD#3 Db/Lan  -CAD--medical management  -Obesity   -probable sleep apnea  -chronic diastolic heart failure  -current tobacco abuse--nicotine patch ordered  -ETOH 4-5 beers a day--monitor closely for signs of withdrawl  -COPD- pulmonary following; nebulizer treatments/IV steroids  -Diabetes type II- A1C 8.13  -hypertension--BB/ACE  -PAD- s/p popliteal artery stent   -nocturnal oxygen at home  -subconjunctival hemorrhage- drops per ophthalmology  -Leukocytosis--IV steroid administration    Plavix and ASA continue to be held  Mobilize  Encourage pulmonary toilet  Excellent response to IV diuretics with last dose early this morning--slight increase in creatinine this morning  Weaned down to 3L, which is what he wears at home  Anticipate discharge home possibly later today    January Stout, PHONG  03/20/20  07:49     Making excellent progress. Monitor right eye closely as outpatient.

## 2020-03-20 NOTE — PROGRESS NOTES
[facility] INPATIENT PROGRESS NOTE         [facility] CARDIOVASC UNIT    3/20/2020      PATIENT IDENTIFICATION:  Name: Fili Ladd ADMIT: 3/17/2020   : 1950  PCP: Micah Michel MD    MRN: [id number] LOS: 3 days   AGE/SEX: 69 y.o. male  ROOM: [facility]                     LOS 3    Reason for visit: Follow-up COPD exacerbation with respiratory failure      SUBJECTIVE:      Sounding much better.  On his home use of oxygen at 3 L/min.  Has nebulizer machine at home.  Okay for discharge from my standpoint.    Objective   OBJECTIVE:    Vital Sign Min/Max for last 24 hours  Temp  Min: 97.5 °F (36.4 °C)  Max: 97.8 °F (36.6 °C)   BP  Min: 126/82  Max: 154/72   Pulse  Min: 92  Max: 138   Resp  Min: 16  Max: 20   SpO2  Min: 90 %  Max: 98 %   No data recorded   Weight  Min: 110 kg (242 lb 9.6 oz)  Max: 110 kg (242 lb 9.6 oz)                   FiO2 (%): 40 %     Body mass index is 36.89 kg/m².    Intake/Output Summary (Last 24 hours) at 3/20/2020 0934  Last data filed at 3/20/2020 0735  Gross per 24 hour   Intake 480 ml   Output 1275 ml   Net -795 ml         Exam:  GEN:  No distress, appears stated age  EYES:   PERRL, anicteric sclera  ENT:    External ears/nose normal, OP clear  NECK:  No adenopathy, midline trachea  LUNGS: Normal chest on inspection, palpation and diminished breath sounds on auscultation  CV:  Normal S1S2, without murmur  ABD:  Non tender, non distended, no hepatosplenomegaly, +BS  EXT:  No edema, cyanosis or clubbing    Scheduled meds:      artificial tears  Right Eye Nightly   atorvastatin 80 mg Oral Nightly   budesonide-formoterol 2 puff Inhalation BID - RT   folic acid 1 mg Oral Daily   [START ON 3/21/2020] furosemide 40 mg Oral Daily   guaiFENesin 600 mg Oral Q12H   insulin lispro 0-9 Units Subcutaneous 4x Daily With Meals & Nightly   ipratropium-albuterol 3 mL Nebulization Q4H - RT   lisinopril 20 mg Oral Daily   LORazepam 0.5 mg Intravenous Q8H   methylPREDNISolone sodium  succinate 40 mg Intravenous Q12H   metoprolol tartrate 25 mg Oral Q8H   multivitamin 1 tablet Oral Daily   nicotine 1 patch Transdermal Q24H   rOPINIRole 4 mg Oral Nightly   sennosides-docusate 2 tablet Oral Nightly   thiamine 100 mg Oral Daily     IV meds:                          sodium chloride 30 mL/hr     Data Review:  Results from last 7 days   Lab Units 03/20/20  0323 03/19/20  0432 03/18/20  0423 03/17/20  2324 03/17/20  1123   SODIUM mmol/L 134* 132* 135* 137 135*   POTASSIUM mmol/L 4.1 4.3 4.9 4.6 4.2   CHLORIDE mmol/L 88* 89* 95* 94* 91*   CO2 mmol/L 32.2* 32.6* 30.3* 26.9 28.8   BUN mg/dL 27* 18 13 14 12   CREATININE mg/dL 1.17 0.96 0.94 1.12 0.95   GLUCOSE mg/dL 228* 197* 134* 221* 174*   CALCIUM mg/dL 9.6 9.5 9.2 9.2 8.5*         Estimated Creatinine Clearance: 72.6 mL/min (by C-G formula based on SCr of 1.17 mg/dL).  Results from last 7 days   Lab Units 03/20/20  0323 03/18/20  0423 03/17/20  1123 03/17/20  0931 03/17/20  0913  03/16/20  0703   WBC 10*3/mm3 17.06* 9.27 6.89  --   --   --  7.66   HEMOGLOBIN g/dL 14.4 13.7 12.4*  --   --   --  14.4   HEMOGLOBIN, POC g/dL  --   --   --  13.3 13.6   < >  --    PLATELETS 10*3/mm3 235 188 186  --   --   --  217    < > = values in this interval not displayed.     Results from last 7 days   Lab Units 03/17/20  1123 03/16/20  0704   INR  1.20* 1.00     Results from last 7 days   Lab Units 03/16/20  0703   ALT (SGPT) U/L 17   AST (SGOT) U/L 22     Results from last 7 days   Lab Units 03/18/20  0826 03/18/20  0552 03/18/20  0106 03/17/20  1500 03/17/20  1105 03/17/20  0931 03/17/20  0913   PH, ARTERIAL pH units 7.346* 7.352 7.373 7.537* 7.365 7.40 7.36   PO2 ART mm Hg 67.5* 64.2* 93.7 58.5* 172.0*  --   --    PCO2, ARTERIAL mm Hg 55.8* 62.0* 53.8* 38.7 57.8*  --   --    HCO3 ART mmol/L 30.5* 34.3* 31.3* 32.9* 33.1*  --   --              2D echo 3/18/2020 reviewed: Grade 2 diastolic dysfunction.  EF 69%    Chest x-ray 3/19/2020 reviewed shows some improvement  mildly in pulmonary edema.    )        Active Hospital Problems    Diagnosis  POA   • Diastolic CHF due to valvular disease (CMS/HCC) [I38, I50.30]  Unknown   • Nonrheumatic aortic valve stenosis [I35.0]  Unknown      Resolved Hospital Problems   No resolved problems to display.       Assessment   ASSESSMENT:  S/P TAVR 3/17/20  Postop respiratory failure with vascular edema requiring persistent mechanical ventilation  AECOPD with severe lung disease: FEV1 of 31%  Restrictive lung disease due to obesity  Type 2 diabetes     PLAN:    Continue bronchodilators and steroid with taper for COPD exacerbation.  Has severe lung disease at baseline.  Wean oxygen as able.  Okay to go home from my standpoint.  Will follow up in office in a few weeks.        Kendall oNvak MD  Pulmonary and Critical Care Medicine  [facility], [facility]  3/20/2020    09:34

## 2020-03-21 ENCOUNTER — READMISSION MANAGEMENT (OUTPATIENT)
Dept: CALL CENTER | Facility: HOSPITAL | Age: 70
End: 2020-03-21

## 2020-03-21 NOTE — OUTREACH NOTE
Prep Survey      Responses   [facility] from?  [place]   Is LACE score < 7 ?  No   Eligibility  Readm Mgmt   Discharge diagnosis  Severe aortic stenosis-s/p TAVRTTE TRANSFEMORAL TRANSCATHETER AORTIC VALVE REPLACEMENT PERCUTANEOUS APPROACH   Does the patient have one of the following disease processes/diagnoses(primary or secondary)?  Cardiothoracic surgery   Does the patient have Home health ordered?  No   Is there a DME ordered?  No   Prep survey completed?  Yes          Domitila Good RN

## 2020-03-23 ENCOUNTER — TELEPHONE (OUTPATIENT)
Dept: CARDIOLOGY | Facility: CLINIC | Age: 70
End: 2020-03-23

## 2020-03-25 ENCOUNTER — TELEPHONE (OUTPATIENT)
Dept: CARDIOLOGY | Facility: CLINIC | Age: 70
End: 2020-03-25

## 2020-03-25 ENCOUNTER — OFFICE VISIT (OUTPATIENT)
Dept: CARDIOLOGY | Facility: CLINIC | Age: 70
End: 2020-03-25

## 2020-03-25 VITALS
SYSTOLIC BLOOD PRESSURE: 139 MMHG | WEIGHT: 240 LBS | HEART RATE: 85 BPM | HEIGHT: 68 IN | BODY MASS INDEX: 36.37 KG/M2 | DIASTOLIC BLOOD PRESSURE: 68 MMHG

## 2020-03-25 DIAGNOSIS — E66.3 OVERWEIGHT: ICD-10-CM

## 2020-03-25 DIAGNOSIS — I35.0 NONRHEUMATIC AORTIC VALVE STENOSIS: Primary | ICD-10-CM

## 2020-03-25 DIAGNOSIS — I50.30 DIASTOLIC CHF DUE TO VALVULAR DISEASE (HCC): ICD-10-CM

## 2020-03-25 DIAGNOSIS — I38 DIASTOLIC CHF DUE TO VALVULAR DISEASE (HCC): ICD-10-CM

## 2020-03-25 DIAGNOSIS — R07.2 PRECORDIAL PAIN: ICD-10-CM

## 2020-03-25 DIAGNOSIS — E78.5 HYPERLIPIDEMIA, UNSPECIFIED HYPERLIPIDEMIA TYPE: ICD-10-CM

## 2020-03-25 DIAGNOSIS — I10 ESSENTIAL HYPERTENSION: ICD-10-CM

## 2020-03-25 PROCEDURE — 99214 OFFICE O/P EST MOD 30 MIN: CPT | Performed by: NURSE PRACTITIONER

## 2020-03-25 RX ORDER — LISINOPRIL 20 MG/1
20 TABLET ORAL DAILY
Qty: 30 TABLET | Refills: 3 | Status: SHIPPED | OUTPATIENT
Start: 2020-03-25 | End: 2020-05-24

## 2020-03-25 RX ORDER — FUROSEMIDE 40 MG/1
40 TABLET ORAL DAILY
Qty: 30 TABLET | Refills: 11 | Status: SHIPPED | OUTPATIENT
Start: 2020-03-25 | End: 2021-03-16

## 2020-03-25 RX ORDER — METOPROLOL TARTRATE 50 MG/1
50 TABLET, FILM COATED ORAL 2 TIMES DAILY
Qty: 30 TABLET | Refills: 11 | Status: SHIPPED | OUTPATIENT
Start: 2020-03-25 | End: 2020-05-24

## 2020-03-25 RX ORDER — CLOPIDOGREL BISULFATE 75 MG/1
75 TABLET ORAL DAILY
Qty: 30 TABLET | Refills: 11 | Status: SHIPPED | OUTPATIENT
Start: 2020-03-25 | End: 2021-03-16

## 2020-03-25 NOTE — PROGRESS NOTES
Date of Office Visit: 2020  Encounter Provider: PHONG New  Place of Service: [facility] CARDIOLOGY  Patient Name: Fili Ladd  :1950    Chief Complaint: Severe aortic stenosis  :     HPI: Fili aguilera is a 69-year-old man who is being evaluated for TAVR.  He is a patient of Dr. Montenegro is new to me today.  He presents for 1 week hospital follow-up via telephone call due to coronavirus pandemic.  A longstanding history of tobacco abuse and obesity.  He had gone to see Dr. Mariee who recognized he had severe aortic stenosis and underwent echo and cardiac cath.  Coronary anatomy revealed some moderate disease of the circumflex but no other significant CAD and of course severe aortic stenosis.  He was referred to Dr. Chito Richards for aortic valve surgery but after reviewing his records deemed that he was too high risk due to his pulmonary status.  He was referred to Dr. Montenegro for TAVR.  In the hospital did develop some shortness of breath.  A CTA was performed that showed no evidence of pulmonary embolus.  But were concerning for bronchitis versus pneumonia he was seen by pulmonary and they wanted to follow-up with him in a week.  Last week he was admitted for his elective TAVR procedure.  Patient had successful deployment of a 26 mm Lauri transcatheter aortic valve.  He had no postprocedure complications and is doing well.  Postprocedure echocardiogram showed an EF of 69% he had some grade 2 diastolic dysfunction with pseudonormalization in the aortic valve peak and mean gradients were within defined limits.     He has been doing well since being at home after his TAVR. He girlfriend and him live together in an apartment and they have been going for walks in the evening. He has some bruising at his procedure site but it is soft and minimally tender. He is taking Plavix but is not on aspirin. He said he was told not to when he left the hospital.  He denies shortness of breath, palpitations, or edema. He has some anxiety related to the corona virus going around but other than that feels a little stir crazy.           Past Medical History:   Diagnosis Date   • Chronic diastolic heart failure (CMS/[facility])    • COPD (chronic obstructive pulmonary disease) (CMS/[facility])    • Diabetes mellitus (CMS/[facility])     TYPE 2   • GERD (gastroesophageal reflux disease)    • Heart murmur    • History of Torrez-Elvis toxic epidermal necrolysis overlap syndrome     SEVERAL YEARS AGO   • Hyperlipidemia    • Hypertension    • Obesity    • On home O2     3L CONT O2   • PVD (peripheral vascular disease) (CMS/[facility])    • RLS (restless legs syndrome)    • Severe aortic stenosis    • Shortness of breath    • Sleep apnea     UNABLE TO TOLERATE USING MASK   • Valvular disease        Past Surgical History:   Procedure Laterality Date   • AORTIC VALVE REPAIR/REPLACEMENT N/A 3/17/2020    Procedure: TTE TRANSFEMORAL TRANSCATHETER AORTIC VALVE REPLACEMENT PERCUTANEOUS APPROACH;  Surgeon: Sera Ace MD;  Location: [facility] OR 18/19;  Service: Cardiothoracic;  Laterality: N/A;   • AORTIC VALVE REPAIR/REPLACEMENT N/A 3/17/2020    Procedure: Transfemoral Transcatheter Aortic Valve Replacement w/Intra op TTE and possible Open Rescue Surgery;  Surgeon: Brandon Montenegro MD;  Location: [facility] OR 18/19;  Service: Cardiovascular;  Laterality: N/A;   • CARDIAC CATHETERIZATION N/A 3/4/2020    Procedure: right and Left Heart Cath,;  Surgeon: Paulino Mariee MD;  Location: [facility] CATH INVASIVE LOCATION;  Service: Cardiology;  Laterality: N/A;   • CARDIAC CATHETERIZATION N/A 3/4/2020    Procedure: Coronary angiography;  Surgeon: Paulino Mariee MD;  Location: [facility] CATH INVASIVE LOCATION;  Service: Cardiology;  Laterality: N/A;   • CARDIAC CATHETERIZATION N/A 3/4/2020    Procedure: Left ventriculography;  Surgeon: Paulino Mariee MD;  Location: [facility] INVASIVE  LOCATION;  Service: Cardiology;  Laterality: N/A;   • COLONOSCOPY     • HERNIA REPAIR Left     INGUINAL HERNIA   • ILIAC ARTERY STENT  2019   • POPLITEAL ARTERY STENT Left 2019   • POPLITEAL ARTERY STENT Right     X2       Social History     Socioeconomic History   • Marital status:      Spouse name: Not on file   • Number of children: Not on file   • Years of education: Not on file   • Highest education level: Not on file   Tobacco Use   • Smoking status: Former Smoker     Packs/day: 2.00     Last attempt to quit: 2020     Years since quittin.2   • Smokeless tobacco: Never Used   Substance and Sexual Activity   • Alcohol use: Yes     Comment: DRINKS 4-5 BEERS DAILY///Caffeine use: 3 cups daily   • Drug use: No   • Sexual activity: Defer       Family History   Problem Relation Age of Onset   • Arrhythmia Mother    • Heart attack Maternal Grandfather    • Heart attack Paternal Grandfather    • Hypertension Neg Hx    • Hyperlipidemia Neg Hx    • Heart failure Neg Hx    • Heart disease Neg Hx    • Malig Hyperthermia Neg Hx        Review of Systems   Constitution: Negative for diaphoresis and malaise/fatigue.   Cardiovascular: Negative for chest pain, claudication, dyspnea on exertion, irregular heartbeat, leg swelling, near-syncope, orthopnea, palpitations, paroxysmal nocturnal dyspnea and syncope.   Respiratory: Negative for cough, shortness of breath and sleep disturbances due to breathing.    Musculoskeletal: Negative for falls.   Neurological: Negative for dizziness and weakness.   Psychiatric/Behavioral: Negative for altered mental status and substance abuse.       Allergies   Allergen Reactions   • Penicillins Other (See Comments)     HISTORY OF FOREIGN DEBBIE SYNDROME   • Sulfa Antibiotics Other (See Comments)     HISTORY OF ROSITA DEBBIE SYNDROME         Current Outpatient Medications:   •  albuterol (ACCUNEB) 1.25 MG/3ML nebulizer solution, Take 1 ampule by nebulization 4 (Four) Times  a Day., Disp: , Rfl:   •  albuterol (PROVENTIL HFA;VENTOLIN HFA) 108 (90 BASE) MCG/ACT inhaler, Inhale 2 puffs Every 4 (Four) Hours As Needed for Wheezing., Disp: , Rfl:   •  atorvastatin (LIPITOR) 80 MG tablet, Take 1 tablet by mouth Every Night., Disp: 30 tablet, Rfl: 11  •  benzonatate (TESSALON) 100 MG capsule, Take 1 capsule by mouth 3 (Three) Times a Day As Needed for Cough for up to 30 days., Disp: 90 capsule, Rfl: 0  •  budesonide-formoterol (SYMBICORT) 160-4.5 MCG/ACT inhaler, Inhale 2 puffs 2 (Two) Times a Day., Disp: , Rfl:   •  clopidogrel (PLAVIX) 75 MG tablet, Take 1 tablet by mouth Daily. STATES IS STILL TAKING CURRENTLY., Disp: 30 tablet, Rfl: 11  •  furosemide (LASIX) 40 MG tablet, Take 1 tablet by mouth Daily., Disp: 30 tablet, Rfl: 11  •  Glycerin-Hypromellose- (ARTIFICIAL TEARS) 0.2-0.2-1 % solution ophthalmic solution, Administer 1 drop to the right eye Every 1 (One) Hour As Needed for Dry Eyes., Disp: 15 mL, Rfl: 0  •  guaiFENesin (MUCINEX) 600 MG 12 hr tablet, Take 1 tablet by mouth Every 12 (Twelve) Hours for 60 days., Disp: 60 tablet, Rfl: 1  •  lisinopril (PRINIVIL,ZESTRIL) 20 MG tablet, Take 1 tablet by mouth Daily for 60 days., Disp: 30 tablet, Rfl: 3  •  methylPREDNISolone (MEDROL, SHIRLEY,) 4 MG tablet, Take as directed on package instructions., Disp: 21 each, Rfl: 0  •  metoprolol tartrate (LOPRESSOR) 50 MG tablet, Take 1 tablet by mouth 2 (Two) Times a Day for 60 days., Disp: 30 tablet, Rfl: 11  •  nicotine (NICODERM CQ) 7 MG/24HR patch, Place 1 patch on the skin as directed by provider Daily., Disp: 14 each, Rfl: 3  •  predniSONE (DELTASONE) 10 MG tablet, Take 4 tabs daily x 3 days, then take 3 tabs daily x 3 days, then take 2 tabs daily x 3 days, then take 1 tab daily x 3 days, Disp: 31 tablet, Rfl: 0  •  rOPINIRole (REQUIP) 4 MG tablet, Take 4 mg by mouth Every Night., Disp: , Rfl:   •  Sulconazole Nitrate (ARTIFICIAL TEARS) ophthalmic ointment, Administer 1 application to the  "right eye Every Night., Disp: 1 g, Rfl: 0  •  tiotropium (SPIRIVA) 18 MCG per inhalation capsule, Place 1 capsule into inhaler and inhale Daily., Disp: , Rfl:       Objective:     Vitals:    03/25/20 1120   BP: 139/68   BP Location: Left arm   Pulse: 85   Weight: 109 kg (240 lb)   Height: 172.7 cm (68\")     Body mass index is 36.49 kg/m².    PHYSICAL EXAM:    Physical Exam    Procedures      Assessment:       Diagnosis Plan   1. Nonrheumatic aortic valve stenosis  furosemide (LASIX) 40 MG tablet   2. Essential hypertension  furosemide (LASIX) 40 MG tablet   3. Hyperlipidemia, unspecified hyperlipidemia type  furosemide (LASIX) 40 MG tablet   4. Diastolic CHF due to valvular disease (CMS/[facility])     5. Overweight     6. Precordial pain  furosemide (LASIX) 40 MG tablet     No orders of the defined types were placed in this encounter.         Plan:       Patient reports bruising at his procedure site.  However he says that it is soft and minimally tender so I think this is probably normal for his postop state.  His blood pressure is 139/68 his resting heart rate is 85.  I am getting increase his metoprolol to 50 mg twice a day I do not think he has been taking his beta-blocker right at home.  His girlfriend states she thought it was a once a day medicine so working to make it 50 twice a day to make it easier on him.  This will also help better control his blood pressure.  I am not sure why he is not taking aspirin I am going to double check with Dr. Montenegro to let the patient know if he needs to start aspirin otherwise he is going to come see us in May and will get an echo done at that time.  Otherwise I think he is doing well.  I encouraged him to take a walk in his neighborhood and get out of the house a little bit staying clear of other people and protecting himself for risk of infection.     This patient has consented to a telehealth visit via telephone. I spent 17 minutes in total including but not limited to the " direct conversation with this patient. All vitals recorded within this visit are reported by the patient.       Your medication list           Accurate as of March 25, 2020 11:40 AM. If you have any questions, ask your nurse or doctor.               CHANGE how you take these medications      Instructions Last Dose Given Next Dose Due   metoprolol tartrate 50 MG tablet  Commonly known as:  LOPRESSOR  What changed:    · medication strength  · how much to take  · when to take this  Changed by:  PHONG New      Take 1 tablet by mouth 2 (Two) Times a Day for 60 days.          CONTINUE taking these medications      Instructions Last Dose Given Next Dose Due   albuterol 1.25 MG/3ML nebulizer solution  Commonly known as:  ACCUNEB      Take 1 ampule by nebulization 4 (Four) Times a Day.       albuterol sulfate  (90 Base) MCG/ACT inhaler  Commonly known as:  PROVENTIL HFA;VENTOLIN HFA;PROAIR HFA      Inhale 2 puffs Every 4 (Four) Hours As Needed for Wheezing.       artificial tears ophthalmic ointment      Administer 1 application to the right eye Every Night.       atorvastatin 80 MG tablet  Commonly known as:  LIPITOR      Take 1 tablet by mouth Every Night.       benzonatate 100 MG capsule  Commonly known as:  TESSALON      Take 1 capsule by mouth 3 (Three) Times a Day As Needed for Cough for up to 30 days.       budesonide-formoterol 160-4.5 MCG/ACT inhaler  Commonly known as:  SYMBICORT      Inhale 2 puffs 2 (Two) Times a Day.       clopidogrel 75 MG tablet  Commonly known as:  PLAVIX      Take 1 tablet by mouth Daily. STATES IS STILL TAKING CURRENTLY.       furosemide 40 MG tablet  Commonly known as:  LASIX      Take 1 tablet by mouth Daily.       Glycerin-Hypromellose- 0.2-0.2-1 % solution ophthalmic solution  Commonly known as:  ARTIFICIAL TEARS      Administer 1 drop to the right eye Every 1 (One) Hour As Needed for Dry Eyes.       guaiFENesin 600 MG 12 hr tablet  Commonly known as:   MUCINEX      Take 1 tablet by mouth Every 12 (Twelve) Hours for 60 days.       lisinopril 20 MG tablet  Commonly known as:  PRINIVIL,ZESTRIL      Take 1 tablet by mouth Daily for 60 days.       methylPREDNISolone 4 MG tablet  Commonly known as:  MEDROL (SHIRLEY)      Take as directed on package instructions.       nicotine 7 MG/24HR patch  Commonly known as:  NICODERM CQ      Place 1 patch on the skin as directed by provider Daily.       predniSONE 10 MG tablet  Commonly known as:  DELTASONE      Take 4 tabs daily x 3 days, then take 3 tabs daily x 3 days, then take 2 tabs daily x 3 days, then take 1 tab daily x 3 days       rOPINIRole 4 MG tablet  Commonly known as:  REQUIP      Take 4 mg by mouth Every Night.       tiotropium 18 MCG per inhalation capsule  Commonly known as:  SPIRIVA      Place 1 capsule into inhaler and inhale Daily.             Where to Get Your Medications      These medications were sent to [facility], [place], [place] - [address] [facility] [phone]  - [phone]   [address] [address]    Phone:  [phone]   · clopidogrel 75 MG tablet  · furosemide 40 MG tablet  · lisinopril 20 MG tablet  · metoprolol tartrate 50 MG tablet           As always, it has been a pleasure to participate in your patient's care.      Sincerely,     Jennifer DARLING

## 2020-03-25 NOTE — TELEPHONE ENCOUNTER
[facility] called, pt's metoprolol needs clarification. You wrote for him to take 50mg twice a day but only disp 30. Would you like me to call the pharmacy and let them know to dispense a quantity of 60?

## 2020-03-27 ENCOUNTER — READMISSION MANAGEMENT (OUTPATIENT)
Dept: CALL CENTER | Facility: HOSPITAL | Age: 70
End: 2020-03-27

## 2020-03-27 NOTE — OUTREACH NOTE
CT Surgery Week 1 Survey      Responses   [facility] patient discharged from?  [place]   Does the patient have one of the following disease processes/diagnoses(primary or secondary)?  Cardiothoracic surgery   Is there a successful TCM telephone encounter documented?  No   Week 1 attempt successful?  Yes   Call start time  1255   Call end time  1256   Discharge diagnosis  Severe aortic stenosis-s/p TAVRTTE TRANSFEMORAL TRANSCATHETER AORTIC VALVE REPLACEMENT PERCUTANEOUS APPROACH   Is patient permission given to speak with other caregiver?  No   Meds reviewed with patient/caregiver?  Yes   Is the patient having any side effects they believe may be caused by any medication additions or changes?  No   Does the patient have all medications related to this admission filled (includes all antibiotics, pain medications, cardiac medications, etc.)  Yes   Is the patient taking all medications as directed (includes completed medication regime)?  Yes   Does the patient have a primary care provider?   Yes   Does the patient have an appointment scheduled with their C/T surgeon?  Yes   Has the patient kept scheduled appointments due by today?  Yes   Psychosocial issues?  No   Did the patient receive a copy of their discharge instructions?  Yes   Nursing interventions  Reviewed instructions with patient   What is the patient's perception of their health status since discharge?  Improving   Nursing interventions  Nurse provided patient education   Is the patient/caregiver able to teach back normal signs of recovery?  Nausea and lack of appetite, Pain or discomfort at incisional site   Nursing interventions  Reassured on normal signs of recovery   Is the patient /caregiver able to teach back basic post-op care?  Continue use of incentive spirometry at least 1 week post discharge, Practice 'cough and deep breath'   Is the patient/caregiver able to teach back signs and symptoms of incisional infection?  Increased redness,  swelling or pain at the incisonal site, Increased drainage or bleeding, Incisional warmth, Pus or odor from incision, Fever   Is the patient/caregiver able to teach back steps to recovery at home?  Set small, achievable goals for return to baseline health, Rest and rebuild strength, gradually increase activity   If the patient is a current smoker, are they able to teach back resources for cessation?  -- [He has stopped smoking. ]   Is the patient/caregiver able to teach back the hierarchy of who to call/visit for symptoms/problems? PCP, Specialist, Home health nurse, Urgent Care, ED, 911  Yes   Week 1 call completed?  Yes            Valencia Coker RN

## 2020-04-01 ENCOUNTER — READMISSION MANAGEMENT (OUTPATIENT)
Dept: CALL CENTER | Facility: HOSPITAL | Age: 70
End: 2020-04-01

## 2020-04-01 NOTE — OUTREACH NOTE
CT Surgery Week 2 Survey      Responses   [facility] patient discharged from?  [place]   Does the patient have one of the following disease processes/diagnoses(primary or secondary)?  Cardiothoracic surgery   Week 2 attempt successful?  Yes   Call start time  1120   Call end time  1128   Discharge diagnosis  Severe aortic stenosis-s/p TAVRTTE TRANSFEMORAL TRANSCATHETER AORTIC VALVE REPLACEMENT PERCUTANEOUS APPROACH   Does the patient have a primary care provider?   Yes   Has the patient kept scheduled appointments due by today?  Yes   What DME was ordered?  [facility]   Has all DME been delivered?  Yes   DME comments  Pt wears O2 when needed per his report. Pt has home nebulizer.   Psychosocial issues?  No   Comments  Pt denies SOA or CP. Has been out walking a small distance. Pt using home nebs. Pt still drinking beer daily. Pt does not have glucometer to monitor glucose, needs new prescription for new one. Pt does monitor bp at home, Pt reports LE edema, he has been elevating them. Monitoring daily wts, wt has stayed same.    What is the patient's perception of their health status since discharge?  Improving   Is the patient /caregiver able to teach back basic post-op care?  Practice 'cough and deep breath', Continue use of incentive spirometry at least 1 week post discharge, Do not remove steri-strips, No tub bath, swimming, or hot tub until instructed by MD, Keep incision areas clean, dry and protected   Is the patient/caregiver able to teach back signs and symptoms of incisional infection?  Pus or odor from incision, Incisional warmth, Fever   Is the patient/caregiver able to teach back steps to recovery at home?  Set small, achievable goals for return to baseline health, Weigh daily   If the patient is a current smoker, are they able to teach back resources for cessation?  Smoking cessation medications [pt remains non smoking. ]   Week 2 call completed?  Yes          Maris Lucero RN

## 2020-04-07 ENCOUNTER — READMISSION MANAGEMENT (OUTPATIENT)
Dept: CALL CENTER | Facility: HOSPITAL | Age: 70
End: 2020-04-07

## 2020-04-07 NOTE — OUTREACH NOTE
CT Surgery Week 3 Survey      Responses   [facility] patient discharged from?  [place]   Does the patient have one of the following disease processes/diagnoses(primary or secondary)?  Cardiothoracic surgery   Week 3 attempt successful?  Yes   Call start time  0908   Call end time  0930   Discharge diagnosis  Severe aortic stenosis-s/p TAVRTTE TRANSFEMORAL TRANSCATHETER AORTIC VALVE REPLACEMENT PERCUTANEOUS APPROACH   Is patient permission given to speak with other caregiver?  No   Meds reviewed with patient/caregiver?  Yes   Prescription comments  Pt need script for glucometer. Called Dr. Michel for him, spoke with Yodit she will send order to [facility].    Is the patient taking all medications as directed (includes completed medication regime)?  Yes   Has the patient kept scheduled appointments due by today?  Yes   Has home health visited the patient within 72 hours of discharge?  N/A   What DME was ordered?  [facility]   What is the patient's perception of their health status since discharge?  Returned to baseline/stable   Additional teach back comments  Has not smoked since surgery   Week 3 call completed?  Yes   Wrap up additional comments  Pt having some issues with SOA. Encouraged him to use incentive spirometer, cough and deep breathe. Sputum is clear. Afebrile. Has not smoked since surgery. Wt 240 lb stable. BP checks at home, gave parameters to call. Still does not have glucometer, called Dr. Michel's office, spoke w/Yodit, she will send order to [facility].           Elisabet Tobar RN

## 2020-04-14 ENCOUNTER — READMISSION MANAGEMENT (OUTPATIENT)
Dept: CALL CENTER | Facility: HOSPITAL | Age: 70
End: 2020-04-14

## 2020-04-14 NOTE — OUTREACH NOTE
CT Surgery Week 4 Survey      Responses   [facility] [place] patient discharged from?  [place]   Does the patient have one of the following disease processes/diagnoses(primary or secondary)?  Cardiothoracic surgery   Call end time  1500   Meds reviewed with patient/caregiver?  Yes   Is the patient taking all medications as directed (includes completed medication regime)?  Yes   Has the patient kept scheduled appointments due by today?  Yes   Is the patient still receiving Home Health Services?  N/A   What is the patient's perception of their health status since discharge?  Improving   Week 4 Call Completed?  Yes   Would the patient like one additional call?  No   Graduated  Yes   Did the patient feel the follow up calls were helpful during their recovery period?  Yes   Was the number of calls appropriate?  Yes   Wrap up additional comments  DOing well.  No problems or C/O.          Yumiko Velasquez RN

## 2020-04-16 ENCOUNTER — APPOINTMENT (OUTPATIENT)
Dept: RESPIRATORY THERAPY | Facility: HOSPITAL | Age: 70
End: 2020-04-16

## 2020-04-23 DIAGNOSIS — I50.30 DIASTOLIC CHF DUE TO VALVULAR DISEASE (HCC): ICD-10-CM

## 2020-04-23 DIAGNOSIS — I38 DIASTOLIC CHF DUE TO VALVULAR DISEASE (HCC): ICD-10-CM

## 2020-04-23 DIAGNOSIS — I35.0 NONRHEUMATIC AORTIC VALVE STENOSIS: Primary | ICD-10-CM

## 2020-04-30 ENCOUNTER — OFFICE VISIT (OUTPATIENT)
Dept: CARDIOLOGY | Facility: CLINIC | Age: 70
End: 2020-04-30

## 2020-04-30 VITALS
HEART RATE: 96 BPM | SYSTOLIC BLOOD PRESSURE: 143 MMHG | HEIGHT: 68 IN | BODY MASS INDEX: 36.98 KG/M2 | DIASTOLIC BLOOD PRESSURE: 75 MMHG | WEIGHT: 244 LBS

## 2020-04-30 DIAGNOSIS — Z95.2 S/P TAVR (TRANSCATHETER AORTIC VALVE REPLACEMENT): ICD-10-CM

## 2020-04-30 DIAGNOSIS — I10 ESSENTIAL HYPERTENSION: ICD-10-CM

## 2020-04-30 DIAGNOSIS — R06.02 SOB (SHORTNESS OF BREATH): Primary | ICD-10-CM

## 2020-04-30 DIAGNOSIS — E78.5 HYPERLIPIDEMIA, UNSPECIFIED HYPERLIPIDEMIA TYPE: ICD-10-CM

## 2020-04-30 PROCEDURE — 99442 PR PHYS/QHP TELEPHONE EVALUATION 11-20 MIN: CPT | Performed by: INTERNAL MEDICINE

## 2020-04-30 RX ORDER — CLONAZEPAM 0.5 MG/1
0.5 TABLET ORAL DAILY
COMMUNITY
Start: 2020-03-26

## 2020-04-30 NOTE — PROGRESS NOTES
Subjective:        Fili Ladd is a 69 y.o. male who here for follow up    CC  Telephone check up    Sob  Complaining of increasing shortness of breath  HPI  69-year-old male with known history of the aortic stenosis underwent aortic valve replacement continues to complains of shortness of breath on minimum exertion     Problem List Items Addressed This Visit        Cardiovascular and Mediastinum    Essential hypertension    Hyperlipidemia    S/P TAVR (transcatheter aortic valve replacement)       Respiratory    SOB (shortness of breath) - Primary        .Discharge Diagnosis:  -Severe aortic stenosis-s/p TAVR Db/Lan  -CAD--medical management  -Obesity   -probable sleep apnea  -chronic diastolic heart failure  -current tobacco abuse--nicotine patch ordered  -ETOH 4-5 beers a day--monitor closely for signs of withdrawl  -COPD- pulmonary following; nebulizer treatments/IV steroids  -Diabetes type II- A1C 8.13  -hypertension--BB/ACE  -PAD- s/p popliteal artery stent   -nocturnal oxygen at home  -subconjunctival hemorrhage- drops per ophthalmology  -Leukocytosis--IV steroid administration    The following portions of the patient's history were reviewed and updated as appropriate: allergies, current medications, past family history, past medical history, past social history, past surgical history and problem list.    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 He smoked 2.00 packs per day. He has never used smokeless tobacco. He reports that he drinks alcohol. He reports that he does not use drugs.   Family History   Problem Relation Age of Onset   • Arrhythmia Mother    • Heart attack Maternal Grandfather    • Heart attack Paternal Grandfather    • Hypertension Neg Hx    • Hyperlipidemia Neg Hx    • Heart failure Neg Hx    • Heart disease Neg Hx    • Malig Hyperthermia Neg Hx        Review of Systems  Constitutional: No wt loss, fever, fatigue  Gastrointestinal: No nausea, abdominal pain  Behavioral/Psych: No insomnia or anxiety   Cardiovascular shortness of breath  Objective:       Physical Exam  Telephone checkup only      Procedures      Echocardiogram:        Current Outpatient Medications:   •  albuterol (ACCUNEB) 1.25 MG/3ML nebulizer solution, Take 1 ampule by nebulization 4 (Four) Times a Day., Disp: , Rfl:   •  albuterol (PROVENTIL HFA;VENTOLIN HFA) 108 (90 BASE) MCG/ACT inhaler, Inhale 2 puffs Every 4 (Four) Hours As Needed for Wheezing., Disp: , Rfl:   •  atorvastatin (LIPITOR) 80 MG tablet, Take 1 tablet by mouth Every Night., Disp: 30 tablet, Rfl: 11  •  budesonide-formoterol (SYMBICORT) 160-4.5 MCG/ACT inhaler, Inhale 2 puffs 2 (Two) Times a Day., Disp: , Rfl:   •  clopidogrel (PLAVIX) 75 MG tablet, Take 1 tablet by mouth Daily. STATES IS STILL TAKING CURRENTLY., Disp: 30 tablet, Rfl: 11  •  furosemide (LASIX) 40 MG tablet, Take 1 tablet by mouth Daily., Disp: 30 tablet, Rfl: 11  •  Glycerin-Hypromellose- (ARTIFICIAL TEARS) 0.2-0.2-1 % solution ophthalmic solution, Administer 1 drop to the right eye Every 1 (One) Hour As Needed for Dry Eyes., Disp: 15 mL, Rfl: 0  •  guaiFENesin (MUCINEX) 600 MG 12 hr tablet, Take 1 tablet by mouth Every 12 (Twelve) Hours for 60 days., Disp: 60 tablet, Rfl: 1  •  lisinopril (PRINIVIL,ZESTRIL) 20 MG tablet, Take 1 tablet by mouth  Daily for 60 days., Disp: 30 tablet, Rfl: 3  •  methylPREDNISolone (MEDROL, SHIRLEY,) 4 MG tablet, Take as directed on package instructions., Disp: 21 each, Rfl: 0  •  metoprolol tartrate (LOPRESSOR) 50 MG tablet, Take 1 tablet by mouth 2 (Two) Times a Day for 60 days., Disp: 30 tablet, Rfl: 11  •  nicotine (NICODERM CQ) 7 MG/24HR patch, Place 1 patch on the skin as directed by provider Daily., Disp: 14 each, Rfl: 3  •  predniSONE (DELTASONE) 10 MG tablet, Take 4 tabs daily x 3 days, then take 3 tabs daily x 3 days, then take 2 tabs daily x 3 days, then take 1 tab daily x 3 days, Disp: 31 tablet, Rfl: 0  •  rOPINIRole (REQUIP) 4 MG tablet, Take 4 mg by mouth Every Night., Disp: , Rfl:   •  Sulconazole Nitrate (ARTIFICIAL TEARS) ophthalmic ointment, Administer 1 application to the right eye Every Night., Disp: 1 g, Rfl: 0  •  tiotropium (SPIRIVA) 18 MCG per inhalation capsule, Place 1 capsule into inhaler and inhale Daily., Disp: , Rfl:   •  clonazePAM (KlonoPIN) 0.5 MG tablet, , Disp: , Rfl:    Assessment:        Patient Active Problem List   Diagnosis   • Aortic valve stenosis   • Chronic obstructive pulmonary disease (CMS/HCC)   • Essential hypertension   • Hyperlipidemia   • SOB (shortness of breath)   • Overweight   • Nonrheumatic aortic valve stenosis   • Diastolic CHF due to valvular disease (CMS/[facility])               Plan:            ICD-10-CM ICD-9-CM   1. SOB (shortness of breath) R06.02 786.05   2. S/P TAVR (transcatheter aortic valve replacement) Z95.2 V43.3   3. Essential hypertension I10 401.9   4. Hyperlipidemia, unspecified hyperlipidemia type E78.5 272.4     1. SOB (shortness of breath)  Multifactorial patient will be brought to the hospital/office in 1 month with echocardiogram    2. S/P TAVR (transcatheter aortic valve replacement)  We will check the echo    3. Essential hypertension  Blood pressure stable*    4. Hyperlipidemia, unspecified hyperlipidemia type  Continue current medications       Still  have sig sob    1 month with echo    This patient has consented to a telehealth visit via telephone. The visit was scheduled as a telephone visit to comply with patient safety concerns in accordance with CDC recommendations.  All vitals recorded within this visit are reported by the patient.  I spent 15 minutes in total including but not limited to the 15 minutes spent in direct conversation with this patient.     COUNSELING:    Fili Jhaveri was given to patient for the following topics: diagnostic results, risk factor reductions, impressions, risks and benefits of treatment options and importance of treatment compliance .       SMOKING COUNSELING:    [unfilled]    Dictated using Dragon dictation  You have chosen to receive care through a telephone visit. Do you consent to use a telephone visit for your medical care today? Yes

## 2020-05-11 RX ORDER — LISINOPRIL 20 MG/1
TABLET ORAL
Qty: 30 TABLET | Refills: 1 | OUTPATIENT
Start: 2020-05-11

## 2020-05-14 ENCOUNTER — APPOINTMENT (OUTPATIENT)
Dept: RESPIRATORY THERAPY | Facility: HOSPITAL | Age: 70
End: 2020-05-14

## 2020-05-15 ENCOUNTER — OFFICE VISIT (OUTPATIENT)
Dept: CARDIOLOGY | Facility: CLINIC | Age: 70
End: 2020-05-15

## 2020-05-15 ENCOUNTER — HOSPITAL ENCOUNTER (OUTPATIENT)
Dept: CARDIOLOGY | Facility: HOSPITAL | Age: 70
Discharge: HOME | End: 2020-05-15
Admitting: INTERNAL MEDICINE

## 2020-05-15 VITALS
DIASTOLIC BLOOD PRESSURE: 80 MMHG | WEIGHT: 247 LBS | HEART RATE: 87 BPM | SYSTOLIC BLOOD PRESSURE: 158 MMHG | BODY MASS INDEX: 37.44 KG/M2 | HEIGHT: 68 IN

## 2020-05-15 VITALS
WEIGHT: 244 LBS | HEIGHT: 68 IN | SYSTOLIC BLOOD PRESSURE: 140 MMHG | DIASTOLIC BLOOD PRESSURE: 70 MMHG | BODY MASS INDEX: 36.98 KG/M2

## 2020-05-15 DIAGNOSIS — R06.02 SOB (SHORTNESS OF BREATH): ICD-10-CM

## 2020-05-15 DIAGNOSIS — I50.30 DIASTOLIC CHF DUE TO VALVULAR DISEASE (HCC): ICD-10-CM

## 2020-05-15 DIAGNOSIS — Z95.2 S/P TAVR (TRANSCATHETER AORTIC VALVE REPLACEMENT): ICD-10-CM

## 2020-05-15 DIAGNOSIS — I35.0 NONRHEUMATIC AORTIC VALVE STENOSIS: ICD-10-CM

## 2020-05-15 DIAGNOSIS — J44.9 CHRONIC OBSTRUCTIVE PULMONARY DISEASE, UNSPECIFIED COPD TYPE (HCC): ICD-10-CM

## 2020-05-15 DIAGNOSIS — I38 DIASTOLIC CHF DUE TO VALVULAR DISEASE (HCC): ICD-10-CM

## 2020-05-15 DIAGNOSIS — I35.0 NONRHEUMATIC AORTIC VALVE STENOSIS: Primary | ICD-10-CM

## 2020-05-15 LAB
AORTIC ROOT ANNULUS: 1.9 CM
ASCENDING AORTA: 2.3 CM
BH CV ECHO MEAS - AO MAX PG (FULL): 14.2 MMHG
BH CV ECHO MEAS - AO MAX PG: 17.4 MMHG
BH CV ECHO MEAS - AO MEAN PG (FULL): 7.6 MMHG
BH CV ECHO MEAS - AO MEAN PG: 9.6 MMHG
BH CV ECHO MEAS - AO ROOT AREA (BSA CORRECTED): 1.1
BH CV ECHO MEAS - AO ROOT AREA: 5 CM^2
BH CV ECHO MEAS - AO ROOT DIAM: 2.5 CM
BH CV ECHO MEAS - AO V2 MAX: 208.7 CM/SEC
BH CV ECHO MEAS - AO V2 MEAN: 146.1 CM/SEC
BH CV ECHO MEAS - AO V2 VTI: 41.3 CM
BH CV ECHO MEAS - ASC AORTA: 2.3 CM
BH CV ECHO MEAS - AVA(I,A): 1.2 CM^2
BH CV ECHO MEAS - AVA(I,D): 1.2 CM^2
BH CV ECHO MEAS - AVA(V,A): 1.1 CM^2
BH CV ECHO MEAS - AVA(V,D): 1.1 CM^2
BH CV ECHO MEAS - BSA(HAYCOCK): 2.4 M^2
BH CV ECHO MEAS - BSA: 2.2 M^2
BH CV ECHO MEAS - BZI_BMI: 37.1 KILOGRAMS/M^2
BH CV ECHO MEAS - BZI_METRIC_HEIGHT: 172.7 CM
BH CV ECHO MEAS - BZI_METRIC_WEIGHT: 110.7 KG
BH CV ECHO MEAS - EDV(MOD-SP2): 46 ML
BH CV ECHO MEAS - EDV(MOD-SP4): 65 ML
BH CV ECHO MEAS - EDV(TEICH): 118.7 ML
BH CV ECHO MEAS - EF(CUBED): 71.6 %
BH CV ECHO MEAS - EF(MOD-BP): 59 %
BH CV ECHO MEAS - EF(MOD-SP2): 58.7 %
BH CV ECHO MEAS - EF(MOD-SP4): 60 %
BH CV ECHO MEAS - EF(TEICH): 63 %
BH CV ECHO MEAS - ESV(MOD-SP2): 19 ML
BH CV ECHO MEAS - ESV(MOD-SP4): 26 ML
BH CV ECHO MEAS - ESV(TEICH): 43.9 ML
BH CV ECHO MEAS - FS: 34.3 %
BH CV ECHO MEAS - IVS/LVPW: 0.96
BH CV ECHO MEAS - IVSD: 1.2 CM
BH CV ECHO MEAS - LAT PEAK E' VEL: 8 CM/SEC
BH CV ECHO MEAS - LV DIASTOLIC VOL/BSA (35-75): 29.2 ML/M^2
BH CV ECHO MEAS - LV MASS(C)D: 237.4 GRAMS
BH CV ECHO MEAS - LV MASS(C)DI: 106.7 GRAMS/M^2
BH CV ECHO MEAS - LV MAX PG: 3.2 MMHG
BH CV ECHO MEAS - LV MEAN PG: 1.9 MMHG
BH CV ECHO MEAS - LV SYSTOLIC VOL/BSA (12-30): 11.7 ML/M^2
BH CV ECHO MEAS - LV V1 MAX: 89.2 CM/SEC
BH CV ECHO MEAS - LV V1 MEAN: 64.2 CM/SEC
BH CV ECHO MEAS - LV V1 VTI: 20.9 CM
BH CV ECHO MEAS - LVIDD: 5 CM
BH CV ECHO MEAS - LVIDS: 3.3 CM
BH CV ECHO MEAS - LVLD AP2: 6.8 CM
BH CV ECHO MEAS - LVLD AP4: 7.2 CM
BH CV ECHO MEAS - LVLS AP2: 5.8 CM
BH CV ECHO MEAS - LVLS AP4: 5.7 CM
BH CV ECHO MEAS - LVOT AREA (M): 2.5 CM^2
BH CV ECHO MEAS - LVOT AREA: 2.5 CM^2
BH CV ECHO MEAS - LVOT DIAM: 1.8 CM
BH CV ECHO MEAS - LVPWD: 1.2 CM
BH CV ECHO MEAS - MED PEAK E' VEL: 8 CM/SEC
BH CV ECHO MEAS - MV A DUR: 0.12 SEC
BH CV ECHO MEAS - MV A MAX VEL: 97.9 CM/SEC
BH CV ECHO MEAS - MV DEC SLOPE: 424.5 CM/SEC^2
BH CV ECHO MEAS - MV DEC TIME: 0.21 SEC
BH CV ECHO MEAS - MV E MAX VEL: 117 CM/SEC
BH CV ECHO MEAS - MV E/A: 1.2
BH CV ECHO MEAS - MV MAX PG: 5 MMHG
BH CV ECHO MEAS - MV MEAN PG: 2.4 MMHG
BH CV ECHO MEAS - MV P1/2T MAX VEL: 114.4 CM/SEC
BH CV ECHO MEAS - MV P1/2T: 78.9 MSEC
BH CV ECHO MEAS - MV V2 MAX: 111.5 CM/SEC
BH CV ECHO MEAS - MV V2 MEAN: 71.8 CM/SEC
BH CV ECHO MEAS - MV V2 VTI: 35.1 CM
BH CV ECHO MEAS - MVA P1/2T LCG: 1.9 CM^2
BH CV ECHO MEAS - MVA(P1/2T): 2.8 CM^2
BH CV ECHO MEAS - MVA(VTI): 1.5 CM^2
BH CV ECHO MEAS - PA MAX PG (FULL): 3.9 MMHG
BH CV ECHO MEAS - PA MAX PG: 5.3 MMHG
BH CV ECHO MEAS - PA V2 MAX: 115.4 CM/SEC
BH CV ECHO MEAS - PULM A REVS DUR: 0.09 SEC
BH CV ECHO MEAS - PULM A REVS VEL: 29.4 CM/SEC
BH CV ECHO MEAS - PULM DIAS VEL: 60.3 CM/SEC
BH CV ECHO MEAS - PULM S/D: 1.1
BH CV ECHO MEAS - PULM SYS VEL: 65.4 CM/SEC
BH CV ECHO MEAS - PVA(V,A): 2.5 CM^2
BH CV ECHO MEAS - PVA(V,D): 2.5 CM^2
BH CV ECHO MEAS - QP/QS: 1.1
BH CV ECHO MEAS - RAP SYSTOLE: 3 MMHG
BH CV ECHO MEAS - RV MAX PG: 1.5 MMHG
BH CV ECHO MEAS - RV MEAN PG: 0.9 MMHG
BH CV ECHO MEAS - RV V1 MAX: 60.4 CM/SEC
BH CV ECHO MEAS - RV V1 MEAN: 45.6 CM/SEC
BH CV ECHO MEAS - RV V1 VTI: 11.3 CM
BH CV ECHO MEAS - RVOT AREA: 4.8 CM^2
BH CV ECHO MEAS - RVOT DIAM: 2.5 CM
BH CV ECHO MEAS - RVSP: 32 MMHG
BH CV ECHO MEAS - SI(AO): 93.7 ML/M^2
BH CV ECHO MEAS - SI(CUBED): 40.5 ML/M^2
BH CV ECHO MEAS - SI(LVOT): 23.2 ML/M^2
BH CV ECHO MEAS - SI(MOD-SP2): 12.1 ML/M^2
BH CV ECHO MEAS - SI(MOD-SP4): 17.5 ML/M^2
BH CV ECHO MEAS - SI(TEICH): 33.6 ML/M^2
BH CV ECHO MEAS - SV(AO): 208.4 ML
BH CV ECHO MEAS - SV(CUBED): 90 ML
BH CV ECHO MEAS - SV(LVOT): 51.6 ML
BH CV ECHO MEAS - SV(MOD-SP2): 27 ML
BH CV ECHO MEAS - SV(MOD-SP4): 39 ML
BH CV ECHO MEAS - SV(RVOT): 54.3 ML
BH CV ECHO MEAS - SV(TEICH): 74.8 ML
BH CV ECHO MEAS - TR MAX VEL: 268.8 CM/SEC
BH CV ECHO MEASUREMENTS AVERAGE E/E' RATIO: 14.63
LEFT ATRIUM VOLUME INDEX: 18 ML/M2
SINUS: 2.6 CM
STJ: 2.6 CM

## 2020-05-15 PROCEDURE — 93306 TTE W/DOPPLER COMPLETE: CPT | Performed by: INTERNAL MEDICINE

## 2020-05-15 PROCEDURE — 93306 TTE W/DOPPLER COMPLETE: CPT

## 2020-05-15 PROCEDURE — 93000 ELECTROCARDIOGRAM COMPLETE: CPT | Performed by: INTERNAL MEDICINE

## 2020-05-15 PROCEDURE — 99214 OFFICE O/P EST MOD 30 MIN: CPT | Performed by: INTERNAL MEDICINE

## 2020-05-15 NOTE — PROGRESS NOTES
Date of Office Visit: 05/15/20  Encounter Provider: Brandon Montenegro MD  Place of Service: [facility] CARDIOLOGY  Patient Name: Fili Ladd  :1950  [id number]    Chief Complaint   Patient presents with   • TAVR F/U     Follow-up   • Nonrheumatic aortic valve stenosis   :     HPI: Fili Ladd is a 69 y.o. male he is a gentleman that has severe COPD obesity and severe aortic stenosis we put a #26 sapient transaortic valve in him a month ago he is here for follow-up.  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 YEARS AGO   • Hyperlipidemia    • Hypertension    • Obesity    • On home O2     3L CONT O2   • PVD (peripheral vascular disease) (CMS/HCC)    • RLS (restless legs syndrome)    • Severe aortic stenosis    • Shortness of breath    • Sleep apnea     UNABLE TO TOLERATE USING MASK   • Valvular disease        Past Surgical History:   Procedure Laterality Date   • AORTIC VALVE REPAIR/REPLACEMENT N/A 3/17/2020    Procedure: TTE TRANSFEMORAL TRANSCATHETER AORTIC VALVE REPLACEMENT PERCUTANEOUS APPROACH;  Surgeon: Sera Ace MD;  Location: [facility] OR ;  Service: Cardiothoracic;  Laterality: N/A;   • AORTIC VALVE REPAIR/REPLACEMENT N/A 3/17/2020    Procedure: Transfemoral Transcatheter Aortic Valve Replacement w/Intra op TTE and possible Open Rescue Surgery;  Surgeon: Brandon Montenegro MD;  Location: [facility] OR ;  Service: Cardiovascular;   Laterality: N/A;   • CARDIAC CATHETERIZATION N/A 3/4/2020    Procedure: right and Left Heart Cath,;  Surgeon: Paulino Mariee MD;  Location:  [facility];  Service: Cardiology;  Laterality: N/A;   • CARDIAC CATHETERIZATION N/A 3/4/2020    Procedure: Coronary angiography;  Surgeon: Paulino Mariee MD;  Location:  [facility];  Service: Cardiology;  Laterality: N/A;   • CARDIAC CATHETERIZATION N/A 3/4/2020    Procedure: Left ventriculography;  Surgeon: Paulino Mariee MD;  Location:  [facility];  Service: Cardiology;  Laterality: N/A;   • COLONOSCOPY     • HERNIA REPAIR Left     INGUINAL HERNIA   • ILIAC ARTERY STENT  2019   • POPLITEAL ARTERY STENT Left 2019   • POPLITEAL ARTERY STENT Right     X2       Social History     Socioeconomic History   • Marital status:      Spouse name: Not on file   • Number of children: Not on file   • Years of education: Not on file   • Highest education level: Not on file   Tobacco Use   • Smoking status: Former Smoker     Packs/day: 2.00     Last attempt to quit: 2020     Years since quittin.3   • Smokeless tobacco: Never Used   • Tobacco comment: quit smoking 6 weeks ago   Substance and Sexual Activity   • Alcohol use: Yes     Comment: DRINKS 4-5 BEERS DAILY///Caffeine use: 3 cups daily   • Drug use: No   • Sexual activity: Defer       Family History   Problem Relation Age of Onset   • Arrhythmia Mother    • Heart attack Maternal Grandfather    • Heart attack Paternal Grandfather    • Hypertension Neg Hx    • Hyperlipidemia Neg Hx    • Heart failure Neg Hx    • Heart disease Neg Hx    • Malig Hyperthermia Neg Hx        Review of Systems   Constitution: Negative for decreased appetite, fever, malaise/fatigue and weight loss.   HENT: Negative for nosebleeds.    Eyes: Negative for double vision.   Cardiovascular: Negative for chest pain, claudication, cyanosis, dyspnea on exertion, irregular  heartbeat, leg swelling, near-syncope, orthopnea, palpitations, paroxysmal nocturnal dyspnea and syncope.   Respiratory: Negative for cough, hemoptysis and shortness of breath.    Hematologic/Lymphatic: Negative for bleeding problem.   Skin: Negative for rash.   Musculoskeletal: Negative for falls and myalgias.   Gastrointestinal: Negative for hematochezia, jaundice, melena, nausea and vomiting.   Genitourinary: Negative for hematuria.   Neurological: Negative for dizziness and seizures.   Psychiatric/Behavioral: Negative for altered mental status and memory loss.       Allergies   Allergen Reactions   • Penicillins Other (See Comments)     HISTORY OF FOREIGN DEBBIE SYNDROME   • Sulfa Antibiotics Other (See Comments)     HISTORY OF ROSITA DEBBIE SYNDROME         Current Outpatient Medications:   •  albuterol (ACCUNEB) 1.25 MG/3ML nebulizer solution, Take 1 ampule by nebulization 4 (Four) Times a Day., Disp: , Rfl:   •  albuterol (PROVENTIL HFA;VENTOLIN HFA) 108 (90 BASE) MCG/ACT inhaler, Inhale 2 puffs Every 4 (Four) Hours As Needed for Wheezing., Disp: , Rfl:   •  atorvastatin (LIPITOR) 80 MG tablet, Take 1 tablet by mouth Every Night., Disp: 30 tablet, Rfl: 11  •  budesonide-formoterol (SYMBICORT) 160-4.5 MCG/ACT inhaler, Inhale 2 puffs 2 (Two) Times a Day., Disp: , Rfl:   •  clonazePAM (KlonoPIN) 0.5 MG tablet, 0.5 mg Daily., Disp: , Rfl:   •  clopidogrel (PLAVIX) 75 MG tablet, Take 1 tablet by mouth Daily. STATES IS STILL TAKING CURRENTLY., Disp: 30 tablet, Rfl: 11  •  furosemide (LASIX) 40 MG tablet, Take 1 tablet by mouth Daily., Disp: 30 tablet, Rfl: 11  •  Glycerin-Hypromellose- (ARTIFICIAL TEARS) 0.2-0.2-1 % solution ophthalmic solution, Administer 1 drop to the right eye Every 1 (One) Hour As Needed for Dry Eyes., Disp: 15 mL, Rfl: 0  •  guaiFENesin (MUCINEX) 600 MG 12 hr tablet, Take 1 tablet by mouth Every 12 (Twelve) Hours for 60 days., Disp: 60 tablet, Rfl: 1  •  lisinopril (PRINIVIL,ZESTRIL)  "20 MG tablet, Take 1 tablet by mouth Daily for 60 days., Disp: 30 tablet, Rfl: 3  •  methylPREDNISolone (MEDROL, SHIRLEY,) 4 MG tablet, Take as directed on package instructions., Disp: 21 each, Rfl: 0  •  metoprolol tartrate (LOPRESSOR) 50 MG tablet, Take 1 tablet by mouth 2 (Two) Times a Day for 60 days., Disp: 30 tablet, Rfl: 11  •  nicotine (NICODERM CQ) 7 MG/24HR patch, Place 1 patch on the skin as directed by provider Daily., Disp: 14 each, Rfl: 3  •  predniSONE (DELTASONE) 10 MG tablet, Take 4 tabs daily x 3 days, then take 3 tabs daily x 3 days, then take 2 tabs daily x 3 days, then take 1 tab daily x 3 days, Disp: 31 tablet, Rfl: 0  •  rOPINIRole (REQUIP) 4 MG tablet, Take 4 mg by mouth Every Night., Disp: , Rfl:   •  Sulconazole Nitrate (ARTIFICIAL TEARS) ophthalmic ointment, Administer 1 application to the right eye Every Night., Disp: 1 g, Rfl: 0  •  tiotropium (SPIRIVA) 18 MCG per inhalation capsule, Place 1 capsule into inhaler and inhale Daily., Disp: , Rfl:       Objective:     Vitals:    05/15/20 1417   BP: 158/80   BP Location: Left arm   Patient Position: Sitting   Pulse: 87   Weight: 112 kg (247 lb)   Height: 172.7 cm (68\")     Body mass index is 37.56 kg/m².    Physical Exam   Constitutional: He is oriented to person, place, and time. He appears well-developed and well-nourished.   HENT:   Head: Normocephalic.   Eyes: No scleral icterus.   Neck: No JVD present. No thyromegaly present.   Cardiovascular: Normal rate and regular rhythm. Exam reveals no gallop and no friction rub.   Murmur heard.   Harsh crescendo-decrescendo early systolic murmur is present with a grade of 1/6 at the upper right sternal border radiating to the neck.  Pulmonary/Chest: Effort normal and breath sounds normal. He has no wheezes. He has no rales.   Abdominal: Soft. There is no hepatosplenomegaly. There is no tenderness.   Musculoskeletal: Normal range of motion. He exhibits no edema.   Lymphadenopathy:     He has no cervical " adenopathy.   Neurological: He is alert and oriented to person, place, and time.   Skin: Skin is warm and dry. No rash noted.   Psychiatric: He has a normal mood and affect.         ECG 12 Lead  Date/Time: 5/15/2020 2:53 PM  Performed by: Brandon Montenegro MD  Authorized by: Brandon Montenegro MD   Comparison: compared with previous ECG   Rhythm: sinus rhythm  Ectopy: atrial premature contractions    Clinical impression: abnormal EKG             Assessment:       Diagnosis Plan   1. Nonrheumatic aortic valve stenosis     2. S/P TAVR (transcatheter aortic valve replacement)     3. Diastolic CHF due to valvular disease (CMS/[facility])     4. SOB (shortness of breath)     5. Chronic obstructive pulmonary disease, unspecified COPD type (CMS/[facility])            Plan:       In general I think he is doing well his echo looks great from a cardiovascular standpoint I do not think he is going to feel any less short of breath until he tries to lose some weight he is got significant lung damage from his smoking I will have him come back and see us in a year sooner if he has trouble    As always, it has been a pleasure to participate in your patient's care.      Sincerely,       Brandon Montenegro MD

## 2020-05-20 RX ORDER — CAFFEINE 200 MG/1
TABLET ORAL
Qty: 14 PATCH | Refills: 3 | OUTPATIENT
Start: 2020-05-20

## 2020-05-28 ENCOUNTER — APPOINTMENT (OUTPATIENT)
Dept: RESPIRATORY THERAPY | Facility: HOSPITAL | Age: 70
End: 2020-05-28

## 2020-06-11 ENCOUNTER — APPOINTMENT (OUTPATIENT)
Dept: RESPIRATORY THERAPY | Facility: HOSPITAL | Age: 70
End: 2020-06-11

## 2020-07-13 ENCOUNTER — TRANSCRIBE ORDERS (OUTPATIENT)
Dept: SLEEP MEDICINE | Facility: HOSPITAL | Age: 70
End: 2020-07-13

## 2020-07-13 DIAGNOSIS — Z01.818 OTHER SPECIFIED PRE-OPERATIVE EXAMINATION: Primary | ICD-10-CM

## 2020-07-14 ENCOUNTER — LAB (OUTPATIENT)
Dept: LAB | Facility: HOSPITAL | Age: 70
End: 2020-07-14

## 2020-07-14 DIAGNOSIS — Z01.818 OTHER SPECIFIED PRE-OPERATIVE EXAMINATION: ICD-10-CM

## 2020-07-14 PROCEDURE — C9803 HOPD COVID-19 SPEC COLLECT: HCPCS

## 2020-07-14 PROCEDURE — U0004 COV-19 TEST NON-CDC HGH THRU: HCPCS

## 2020-07-15 LAB
REF LAB TEST METHOD: NORMAL
SARS-COV-2 RNA RESP QL NAA+PROBE: NOT DETECTED

## 2020-07-16 ENCOUNTER — APPOINTMENT (OUTPATIENT)
Dept: RESPIRATORY THERAPY | Facility: HOSPITAL | Age: 70
End: 2020-07-16

## 2021-03-16 ENCOUNTER — HOSPITAL ENCOUNTER (OUTPATIENT)
Dept: CARDIOLOGY | Facility: HOSPITAL | Age: 71
Discharge: HOME | End: 2021-03-16
Admitting: INTERNAL MEDICINE

## 2021-03-16 ENCOUNTER — OFFICE VISIT (OUTPATIENT)
Dept: CARDIOLOGY | Facility: CLINIC | Age: 71
End: 2021-03-16

## 2021-03-16 VITALS
WEIGHT: 247 LBS | HEIGHT: 68 IN | DIASTOLIC BLOOD PRESSURE: 80 MMHG | BODY MASS INDEX: 37.44 KG/M2 | SYSTOLIC BLOOD PRESSURE: 180 MMHG | HEART RATE: 90 BPM

## 2021-03-16 VITALS
DIASTOLIC BLOOD PRESSURE: 66 MMHG | BODY MASS INDEX: 37.44 KG/M2 | WEIGHT: 247 LBS | HEIGHT: 68 IN | SYSTOLIC BLOOD PRESSURE: 132 MMHG | HEART RATE: 90 BPM

## 2021-03-16 DIAGNOSIS — I38 DIASTOLIC CHF DUE TO VALVULAR DISEASE (HCC): ICD-10-CM

## 2021-03-16 DIAGNOSIS — I35.0 NONRHEUMATIC AORTIC VALVE STENOSIS: ICD-10-CM

## 2021-03-16 DIAGNOSIS — Z95.2 S/P TAVR (TRANSCATHETER AORTIC VALVE REPLACEMENT): Primary | ICD-10-CM

## 2021-03-16 DIAGNOSIS — I50.30 DIASTOLIC CHF DUE TO VALVULAR DISEASE (HCC): ICD-10-CM

## 2021-03-16 DIAGNOSIS — I10 ESSENTIAL HYPERTENSION: ICD-10-CM

## 2021-03-16 DIAGNOSIS — E78.5 HYPERLIPIDEMIA, UNSPECIFIED HYPERLIPIDEMIA TYPE: ICD-10-CM

## 2021-03-16 DIAGNOSIS — R07.2 PRECORDIAL PAIN: ICD-10-CM

## 2021-03-16 PROCEDURE — 93306 TTE W/DOPPLER COMPLETE: CPT

## 2021-03-16 PROCEDURE — 25010000002 PERFLUTREN (DEFINITY) 8.476 MG IN SODIUM CHLORIDE (PF) 0.9 % 10 ML INJECTION: Performed by: INTERNAL MEDICINE

## 2021-03-16 PROCEDURE — 99214 OFFICE O/P EST MOD 30 MIN: CPT | Performed by: INTERNAL MEDICINE

## 2021-03-16 PROCEDURE — 93000 ELECTROCARDIOGRAM COMPLETE: CPT | Performed by: INTERNAL MEDICINE

## 2021-03-16 PROCEDURE — 93306 TTE W/DOPPLER COMPLETE: CPT | Performed by: INTERNAL MEDICINE

## 2021-03-16 RX ORDER — METOPROLOL TARTRATE 50 MG/1
1 TABLET, FILM COATED ORAL 2 TIMES DAILY
COMMUNITY
Start: 2021-02-05 | End: 2021-03-17 | Stop reason: SDUPTHER

## 2021-03-16 RX ORDER — LISINOPRIL 20 MG/1
1 TABLET ORAL DAILY
COMMUNITY
Start: 2021-03-03

## 2021-03-16 RX ORDER — FUROSEMIDE 40 MG/1
40 TABLET ORAL 2 TIMES DAILY
Qty: 180 TABLET | Refills: 2 | Status: SHIPPED | OUTPATIENT
Start: 2021-03-16 | End: 2021-03-17 | Stop reason: SDUPTHER

## 2021-03-16 RX ADMIN — PERFLUTREN 1.5 ML: 6.52 INJECTION, SUSPENSION INTRAVENOUS at 14:29

## 2021-03-16 NOTE — PROGRESS NOTES
Date of Office Visit: 21  Encounter Provider: Brandon Montenegro MD  Place of Service: [facility] CARDIOLOGY  Patient Name: Fili Ladd  :1950  [id number]    Chief Complaint   Patient presents with   • 1 Year TAVR Follow Up   :     HPI: Fili Ladd is a 70 y.o. male he is a gentleman that has severe COPD obesity and severe aortic stenosis we put a #26 sapient transaortic valve in him in 3/20 he is here for follow-up.  At the time of his valve replacement I told him to stop smoking and he did.  He still is on 3 L of oxygen he says he has lost a little bit of weight he has a lot of swelling in his feet but no PND orthopnea.  He has been vaccinated    Past Medical History:   Diagnosis Date   • Chronic diastolic heart failure (CMS/HCC)    • COPD (chronic obstructive pulmonary disease) (CMS/HCC)    • Diabetes mellitus (CMS/HCC)     TYPE 2   • GERD (gastroesophageal reflux disease)    • Heart murmur    • History of Torrez-Elvis toxic epidermal necrolysis overlap syndrome     SEVERAL YEARS AGO   • Hyperlipidemia    • Hypertension    • Obesity    • On home O2     3L CONT O2   • PVD (peripheral vascular disease) (CMS/HCC)    • RLS (restless legs syndrome)    • Severe aortic stenosis    • Shortness of breath    • Sleep apnea     UNABLE TO TOLERATE USING MASK   • Valvular disease        Past Surgical History:   Procedure Laterality Date   • AORTIC VALVE REPAIR/REPLACEMENT N/A 3/17/2020    Procedure: TTE TRANSFEMORAL TRANSCATHETER AORTIC VALVE REPLACEMENT PERCUTANEOUS APPROACH;  Surgeon: Sera Ace MD;  Location: [facility] OR ;  Service: Cardiothoracic;  Laterality: N/A;   • AORTIC VALVE REPAIR/REPLACEMENT N/A 3/17/2020    Procedure: Transfemoral Transcatheter Aortic Valve Replacement w/Intra op TTE and possible Open Rescue Surgery;  Surgeon: Brandon Montenegro MD;  Location: [facility] OR ;  Service: Cardiovascular;  Laterality: N/A;   • CARDIAC  CATHETERIZATION N/A 3/4/2020    Procedure: right and Left Heart Cath,;  Surgeon: Paulino Mariee MD;  Location:  [facility];  Service: Cardiology;  Laterality: N/A;   • CARDIAC CATHETERIZATION N/A 3/4/2020    Procedure: Coronary angiography;  Surgeon: Paulino Mariee MD;  Location:  [facility];  Service: Cardiology;  Laterality: N/A;   • CARDIAC CATHETERIZATION N/A 3/4/2020    Procedure: Left ventriculography;  Surgeon: Paulino Mariee MD;  Location:  [facility];  Service: Cardiology;  Laterality: N/A;   • COLONOSCOPY     • HERNIA REPAIR Left     INGUINAL HERNIA   • ILIAC ARTERY STENT  2019   • POPLITEAL ARTERY STENT Left 2019   • POPLITEAL ARTERY STENT Right     X2       Social History     Socioeconomic History   • Marital status:      Spouse name: Not on file   • Number of children: Not on file   • Years of education: Not on file   • Highest education level: Not on file   Tobacco Use   • Smoking status: Former Smoker     Packs/day: 2.00     Quit date:      Years since quittin.2   • Smokeless tobacco: Never Used   • Tobacco comment: quit smoking 6 weeks ago   Substance and Sexual Activity   • Alcohol use: Yes     Comment: DRINKS 4-5 BEERS DAILY///Caffeine use: 3 cups daily   • Drug use: No   • Sexual activity: Defer       Family History   Problem Relation Age of Onset   • Arrhythmia Mother    • Heart attack Maternal Grandfather    • Heart attack Paternal Grandfather    • Hypertension Neg Hx    • Hyperlipidemia Neg Hx    • Heart failure Neg Hx    • Heart disease Neg Hx    • Malig Hyperthermia Neg Hx        Review of Systems   Constitutional: Negative for decreased appetite, fever, malaise/fatigue and weight loss.   HENT: Negative for nosebleeds.    Eyes: Negative for double vision.   Cardiovascular: Negative for chest pain, claudication, cyanosis, dyspnea on exertion, irregular heartbeat, leg swelling, near-syncope,  orthopnea, palpitations, paroxysmal nocturnal dyspnea and syncope.   Respiratory: Negative for cough, hemoptysis and shortness of breath.    Hematologic/Lymphatic: Negative for bleeding problem.   Skin: Negative for rash.   Musculoskeletal: Negative for falls and myalgias.   Gastrointestinal: Negative for hematochezia, jaundice, melena, nausea and vomiting.   Genitourinary: Negative for hematuria.   Neurological: Negative for dizziness and seizures.   Psychiatric/Behavioral: Negative for altered mental status and memory loss.       Allergies   Allergen Reactions   • Penicillins Other (See Comments)     HISTORY OF FOREIGN DEBBIE SYNDROME   • Sulfa Antibiotics Other (See Comments)     HISTORY OF ROSITA DEBBIE SYNDROME         Current Outpatient Medications:   •  albuterol (ACCUNEB) 1.25 MG/3ML nebulizer solution, Take 1 ampule by nebulization 4 (Four) Times a Day., Disp: , Rfl:   •  albuterol (PROVENTIL HFA;VENTOLIN HFA) 108 (90 BASE) MCG/ACT inhaler, Inhale 2 puffs Every 4 (Four) Hours As Needed for Wheezing., Disp: , Rfl:   •  atorvastatin (LIPITOR) 80 MG tablet, Take 1 tablet by mouth Every Night., Disp: 30 tablet, Rfl: 11  •  budesonide-formoterol (SYMBICORT) 160-4.5 MCG/ACT inhaler, Inhale 2 puffs 2 (Two) Times a Day., Disp: , Rfl:   •  clonazePAM (KlonoPIN) 0.5 MG tablet, 0.5 mg Daily., Disp: , Rfl:   •  furosemide (LASIX) 40 MG tablet, Take 1 tablet by mouth 2 (Two) Times a Day., Disp: 180 tablet, Rfl: 2  •  Glycerin-Hypromellose- (ARTIFICIAL TEARS) 0.2-0.2-1 % solution ophthalmic solution, Administer 1 drop to the right eye Every 1 (One) Hour As Needed for Dry Eyes., Disp: 15 mL, Rfl: 0  •  lisinopril (PRINIVIL,ZESTRIL) 20 MG tablet, Take 1 tablet by mouth Daily., Disp: , Rfl:   •  metoprolol tartrate (LOPRESSOR) 50 MG tablet, Take 1 tablet by mouth 2 (two) times a day., Disp: , Rfl:   •  nicotine (NICODERM CQ) 7 MG/24HR patch, Place 1 patch on the skin as directed by provider Daily., Disp: 14 each,  "Rfl: 3  •  rOPINIRole (REQUIP) 4 MG tablet, Take 4 mg by mouth Every Night., Disp: , Rfl:   •  Sulconazole Nitrate (ARTIFICIAL TEARS) ophthalmic ointment, Administer 1 application to the right eye Every Night., Disp: 1 g, Rfl: 0  •  tiotropium (SPIRIVA) 18 MCG per inhalation capsule, Place 1 capsule into inhaler and inhale Daily., Disp: , Rfl:   No current facility-administered medications for this visit.      Objective:     Vitals:    03/16/21 1457   BP: 132/66   Pulse: 90   Weight: 112 kg (247 lb)   Height: 172.7 cm (68\")     Body mass index is 37.56 kg/m².    Physical Exam  Constitutional:       Appearance: He is well-developed.   HENT:      Head: Normocephalic.   Eyes:      General: No scleral icterus.  Neck:      Thyroid: No thyromegaly.      Vascular: No JVD.   Cardiovascular:      Rate and Rhythm: Normal rate and regular rhythm.      Heart sounds: Murmur present. Systolic murmur present with a grade of 1/6. No friction rub. No gallop.    Pulmonary:      Effort: Pulmonary effort is normal.      Breath sounds: Normal breath sounds. No wheezing or rales.   Abdominal:      Palpations: Abdomen is soft.      Tenderness: There is no abdominal tenderness.   Musculoskeletal:         General: Normal range of motion.      Right lower leg: Edema present.      Left lower leg: Edema present.   Lymphadenopathy:      Cervical: No cervical adenopathy.   Skin:     General: Skin is warm and dry.      Findings: No rash.   Neurological:      Mental Status: He is alert and oriented to person, place, and time.           ECG 12 Lead    Date/Time: 3/16/2021 3:37 PM  Performed by: Brandon Montenegro MD  Authorized by: Brandon Montenegro MD   Comparison: compared with previous ECG   Similar to previous ECG  Rhythm: sinus rhythm    Clinical impression: normal ECG             Assessment:       Diagnosis Plan   1. S/P TAVR (transcatheter aortic valve replacement)     2. Nonrheumatic aortic valve stenosis  furosemide (LASIX) 40 MG tablet   3. " Hyperlipidemia, unspecified hyperlipidemia type  furosemide (LASIX) 40 MG tablet   4. Essential hypertension  furosemide (LASIX) 40 MG tablet   5. Precordial pain  furosemide (LASIX) 40 MG tablet          Plan:       From a cardiac standpoint he is doing well he is not having chest pain.  He does not have orthopnea or PND he does have some edema his get terrible COPD he is on chronic 3 L of oxygen.  His echo looks fantastic today and there is no AI gradient across the valve as well as LV function is normal.  I am going to increase his Lasix a little bit to 40 twice a day and see if that will help with his edema I will get a get a BMP on him in 1 month I am also going to stop his Plavix and put him on a baby aspirin.  I will have him come back and see us in a year and see me in 2 years sooner if he has trouble    As always, it has been a pleasure to participate in your patient's care.      Sincerely,       Brandon Montenegro MD

## 2021-03-17 DIAGNOSIS — E78.5 HYPERLIPIDEMIA, UNSPECIFIED HYPERLIPIDEMIA TYPE: ICD-10-CM

## 2021-03-17 DIAGNOSIS — R07.2 PRECORDIAL PAIN: ICD-10-CM

## 2021-03-17 DIAGNOSIS — I10 ESSENTIAL HYPERTENSION: ICD-10-CM

## 2021-03-17 DIAGNOSIS — I35.0 NONRHEUMATIC AORTIC VALVE STENOSIS: ICD-10-CM

## 2021-03-17 LAB
AORTIC DIMENSIONLESS INDEX: 0.7 (DI)
ASCENDING AORTA: 2.5 CM
BH CV ECHO AV AORTIC VALVE AT ACCEL TIME CALCULATED: NORMAL MSEC
BH CV ECHO MEAS - ACS: 1.4 CM
BH CV ECHO MEAS - AO ACC TIME: 0.07 SEC
BH CV ECHO MEAS - AO MAX PG (FULL): 13 MMHG
BH CV ECHO MEAS - AO MAX PG: 17.6 MMHG
BH CV ECHO MEAS - AO MEAN PG (FULL): 5 MMHG
BH CV ECHO MEAS - AO MEAN PG: 8 MMHG
BH CV ECHO MEAS - AO ROOT AREA (BSA CORRECTED): 1.2
BH CV ECHO MEAS - AO ROOT AREA: 5.7 CM^2
BH CV ECHO MEAS - AO ROOT DIAM: 2.7 CM
BH CV ECHO MEAS - AO V2 MAX: 210 CM/SEC
BH CV ECHO MEAS - AO V2 MEAN: 132 CM/SEC
BH CV ECHO MEAS - AO V2 VTI: 32.6 CM
BH CV ECHO MEAS - ASC AORTA: 2.5 CM
BH CV ECHO MEAS - AT: 74 SEC
BH CV ECHO MEAS - AVA(I,A): 2 CM^2
BH CV ECHO MEAS - AVA(I,D): 2 CM^2
BH CV ECHO MEAS - AVA(V,A): 1.5 CM^2
BH CV ECHO MEAS - AVA(V,D): 1.5 CM^2
BH CV ECHO MEAS - BSA(HAYCOCK): 2.4 M^2
BH CV ECHO MEAS - BSA: 2.2 M^2
BH CV ECHO MEAS - BZI_BMI: 37.6 KILOGRAMS/M^2
BH CV ECHO MEAS - BZI_METRIC_HEIGHT: 172.7 CM
BH CV ECHO MEAS - BZI_METRIC_WEIGHT: 112 KG
BH CV ECHO MEAS - EDV(MOD-SP2): 95 ML
BH CV ECHO MEAS - EDV(MOD-SP4): 101 ML
BH CV ECHO MEAS - EDV(TEICH): 83.1 ML
BH CV ECHO MEAS - EF(CUBED): 69.3 %
BH CV ECHO MEAS - EF(MOD-BP): 57.5 %
BH CV ECHO MEAS - EF(MOD-SP2): 51.6 %
BH CV ECHO MEAS - EF(MOD-SP4): 65.3 %
BH CV ECHO MEAS - EF(TEICH): 61.2 %
BH CV ECHO MEAS - ESV(MOD-SP2): 46 ML
BH CV ECHO MEAS - ESV(MOD-SP4): 35 ML
BH CV ECHO MEAS - ESV(TEICH): 32.2 ML
BH CV ECHO MEAS - FS: 32.6 %
BH CV ECHO MEAS - IVS/LVPW: 1
BH CV ECHO MEAS - IVSD: 1.2 CM
BH CV ECHO MEAS - LAT PEAK E' VEL: 6 CM/SEC
BH CV ECHO MEAS - LV DIASTOLIC VOL/BSA (35-75): 45.2 ML/M^2
BH CV ECHO MEAS - LV MASS(C)D: 184.7 GRAMS
BH CV ECHO MEAS - LV MASS(C)DI: 82.6 GRAMS/M^2
BH CV ECHO MEAS - LV MAX PG: 4.7 MMHG
BH CV ECHO MEAS - LV MEAN PG: 3 MMHG
BH CV ECHO MEAS - LV SYSTOLIC VOL/BSA (12-30): 15.7 ML/M^2
BH CV ECHO MEAS - LV V1 MAX: 108 CM/SEC
BH CV ECHO MEAS - LV V1 MEAN: 84.9 CM/SEC
BH CV ECHO MEAS - LV V1 VTI: 22.7 CM
BH CV ECHO MEAS - LVIDD: 4.3 CM
BH CV ECHO MEAS - LVIDS: 2.9 CM
BH CV ECHO MEAS - LVLD AP2: 7.9 CM
BH CV ECHO MEAS - LVLD AP4: 7.5 CM
BH CV ECHO MEAS - LVLS AP2: 6.8 CM
BH CV ECHO MEAS - LVLS AP4: 6 CM
BH CV ECHO MEAS - LVOT AREA (M): 2.8 CM^2
BH CV ECHO MEAS - LVOT AREA: 2.8 CM^2
BH CV ECHO MEAS - LVOT DIAM: 1.9 CM
BH CV ECHO MEAS - LVPWD: 1.2 CM
BH CV ECHO MEAS - MED PEAK E' VEL: 5.9 CM/SEC
BH CV ECHO MEAS - MV A DUR: 0.15 SEC
BH CV ECHO MEAS - MV A MAX VEL: 105 CM/SEC
BH CV ECHO MEAS - MV DEC SLOPE: 573 CM/SEC^2
BH CV ECHO MEAS - MV DEC TIME: 0.21 SEC
BH CV ECHO MEAS - MV E MAX VEL: 73.7 CM/SEC
BH CV ECHO MEAS - MV E/A: 0.7
BH CV ECHO MEAS - MV MAX PG: 7 MMHG
BH CV ECHO MEAS - MV MEAN PG: 4 MMHG
BH CV ECHO MEAS - MV P1/2T MAX VEL: 106 CM/SEC
BH CV ECHO MEAS - MV P1/2T: 54.2 MSEC
BH CV ECHO MEAS - MV V2 MAX: 132 CM/SEC
BH CV ECHO MEAS - MV V2 MEAN: 92.2 CM/SEC
BH CV ECHO MEAS - MV V2 VTI: 23.6 CM
BH CV ECHO MEAS - MVA P1/2T LCG: 2.1 CM^2
BH CV ECHO MEAS - MVA(P1/2T): 4.1 CM^2
BH CV ECHO MEAS - MVA(VTI): 2.7 CM^2
BH CV ECHO MEAS - PA ACC TIME: 0.07 SEC
BH CV ECHO MEAS - PA MAX PG (FULL): 1.6 MMHG
BH CV ECHO MEAS - PA MAX PG: 4.2 MMHG
BH CV ECHO MEAS - PA PR(ACCEL): 45.7 MMHG
BH CV ECHO MEAS - PA V2 MAX: 103 CM/SEC
BH CV ECHO MEAS - PULM A REVS DUR: 0.11 SEC
BH CV ECHO MEAS - PULM A REVS VEL: 27 CM/SEC
BH CV ECHO MEAS - PULM DIAS VEL: 36.6 CM/SEC
BH CV ECHO MEAS - PULM S/D: 1.3
BH CV ECHO MEAS - PULM SYS VEL: 47.5 CM/SEC
BH CV ECHO MEAS - PVA(V,A): 2.5 CM^2
BH CV ECHO MEAS - PVA(V,D): 2.5 CM^2
BH CV ECHO MEAS - QP/QS: 0.47
BH CV ECHO MEAS - RV MAX PG: 2.6 MMHG
BH CV ECHO MEAS - RV MEAN PG: 1 MMHG
BH CV ECHO MEAS - RV V1 MAX: 80.9 CM/SEC
BH CV ECHO MEAS - RV V1 MEAN: 52.7 CM/SEC
BH CV ECHO MEAS - RV V1 VTI: 9.7 CM
BH CV ECHO MEAS - RVOT AREA: 3.1 CM^2
BH CV ECHO MEAS - RVOT DIAM: 2 CM
BH CV ECHO MEAS - SI(AO): 83.5 ML/M^2
BH CV ECHO MEAS - SI(CUBED): 24.7 ML/M^2
BH CV ECHO MEAS - SI(LVOT): 28.8 ML/M^2
BH CV ECHO MEAS - SI(MOD-SP2): 21.9 ML/M^2
BH CV ECHO MEAS - SI(MOD-SP4): 29.5 ML/M^2
BH CV ECHO MEAS - SI(TEICH): 22.7 ML/M^2
BH CV ECHO MEAS - SUP REN AO DIAM: 2 CM
BH CV ECHO MEAS - SV(AO): 186.7 ML
BH CV ECHO MEAS - SV(CUBED): 55.1 ML
BH CV ECHO MEAS - SV(LVOT): 64.4 ML
BH CV ECHO MEAS - SV(MOD-SP2): 49 ML
BH CV ECHO MEAS - SV(MOD-SP4): 66 ML
BH CV ECHO MEAS - SV(RVOT): 30.3 ML
BH CV ECHO MEAS - SV(TEICH): 50.9 ML
BH CV ECHO MEAS - TAPSE (>1.6): 2.1 CM
BH CV ECHO MEASUREMENTS AVERAGE E/E' RATIO: 12.39
BH CV XLRA - RV BASE: 2.7 CM
BH CV XLRA - RV LENGTH: 7.2 CM
BH CV XLRA - RV MID: 2.5 CM
BH CV XLRA - TDI S': 15.1 CM/SEC
LEFT ATRIUM VOLUME INDEX: 20 ML/M2
MAXIMAL PREDICTED HEART RATE: 151 BPM
SINUS: 2.2 CM
STJ: 2.3 CM
STRESS TARGET HR: 128 BPM

## 2021-03-17 RX ORDER — FUROSEMIDE 40 MG/1
40 TABLET ORAL 2 TIMES DAILY
Qty: 180 TABLET | Refills: 2 | Status: SHIPPED | OUTPATIENT
Start: 2021-03-17

## 2021-03-17 RX ORDER — METOPROLOL TARTRATE 50 MG/1
50 TABLET, FILM COATED ORAL 2 TIMES DAILY
Qty: 180 TABLET | Refills: 2 | Status: SHIPPED | OUTPATIENT
Start: 2021-03-17 | End: 2022-01-12

## 2021-04-12 ENCOUNTER — TELEPHONE (OUTPATIENT)
Dept: CARDIOLOGY | Facility: CLINIC | Age: 71
End: 2021-04-12

## 2021-04-12 NOTE — TELEPHONE ENCOUNTER
Patient was calling to see were to go for his follow up labs  I told him to go over to the hosp at  out-pt registration and they will take him down to the lab to have done.

## 2022-01-12 RX ORDER — METOPROLOL TARTRATE 50 MG/1
TABLET, FILM COATED ORAL
Qty: 180 TABLET | Refills: 2 | Status: SHIPPED | OUTPATIENT
Start: 2022-01-12

## 2023-08-12 ENCOUNTER — INPATIENT HOSPITAL (OUTPATIENT)
Dept: URBAN - METROPOLITAN AREA HOSPITAL 107 | Facility: HOSPITAL | Age: 73
End: 2023-08-12
Payer: MEDICARE

## 2023-08-12 DIAGNOSIS — K92.1 MELENA: ICD-10-CM

## 2023-08-12 PROCEDURE — 99222 1ST HOSP IP/OBS MODERATE 55: CPT | Performed by: INTERNAL MEDICINE

## 2023-08-13 PROCEDURE — 99232 SBSQ HOSP IP/OBS MODERATE 35: CPT | Performed by: INTERNAL MEDICINE

## 2023-08-14 ENCOUNTER — INPATIENT HOSPITAL (OUTPATIENT)
Dept: URBAN - METROPOLITAN AREA HOSPITAL 107 | Facility: HOSPITAL | Age: 73
End: 2023-08-14
Payer: MEDICARE

## 2023-08-14 DIAGNOSIS — K31.811 ANGIODYSPLASIA OF STOMACH AND DUODENUM WITH BLEEDING: ICD-10-CM

## 2023-08-14 DIAGNOSIS — K63.5 POLYP OF COLON: ICD-10-CM

## 2023-08-14 DIAGNOSIS — D62 ACUTE POSTHEMORRHAGIC ANEMIA: ICD-10-CM

## 2023-08-14 DIAGNOSIS — K92.2 GASTROINTESTINAL HEMORRHAGE, UNSPECIFIED: ICD-10-CM

## 2023-08-14 DIAGNOSIS — K57.30 DIVERTICULOSIS OF LARGE INTESTINE WITHOUT PERFORATION OR ABS: ICD-10-CM

## 2023-08-14 PROCEDURE — 45378 DIAGNOSTIC COLONOSCOPY: CPT | Performed by: INTERNAL MEDICINE

## 2023-08-14 PROCEDURE — 43255 EGD CONTROL BLEEDING ANY: CPT | Performed by: INTERNAL MEDICINE

## 2023-08-15 ENCOUNTER — INPATIENT HOSPITAL (OUTPATIENT)
Dept: URBAN - METROPOLITAN AREA HOSPITAL 107 | Facility: HOSPITAL | Age: 73
End: 2023-08-15
Payer: MEDICARE

## 2023-08-15 DIAGNOSIS — Z79.01 LONG TERM (CURRENT) USE OF ANTICOAGULANTS: ICD-10-CM

## 2023-08-15 DIAGNOSIS — D62 ACUTE POSTHEMORRHAGIC ANEMIA: ICD-10-CM

## 2023-08-15 DIAGNOSIS — K92.2 GASTROINTESTINAL HEMORRHAGE, UNSPECIFIED: ICD-10-CM

## 2023-08-15 PROCEDURE — 99232 SBSQ HOSP IP/OBS MODERATE 35: CPT | Performed by: INTERNAL MEDICINE

## 2023-08-16 ENCOUNTER — INPATIENT HOSPITAL (OUTPATIENT)
Dept: URBAN - METROPOLITAN AREA HOSPITAL 107 | Facility: HOSPITAL | Age: 73
End: 2023-08-16
Payer: MEDICARE

## 2023-08-16 DIAGNOSIS — D64.9 ANEMIA, UNSPECIFIED: ICD-10-CM

## 2023-08-16 DIAGNOSIS — K92.2 GASTROINTESTINAL HEMORRHAGE, UNSPECIFIED: ICD-10-CM

## 2023-08-16 PROCEDURE — 99232 SBSQ HOSP IP/OBS MODERATE 35: CPT | Performed by: PHYSICIAN ASSISTANT

## 2023-08-21 ENCOUNTER — TELEPHONE (OUTPATIENT)
Dept: CARDIOLOGY | Facility: CLINIC | Age: 73
End: 2023-08-21

## 2023-08-21 NOTE — TELEPHONE ENCOUNTER
Caller: Orquidea Rascon    Relationship to patient: Emergency Contact    Best call back number: [phone]    New or established patient?  [] New  [x] Established    Date of discharge: 8/16/23    Facility discharged from: 8/16/23    Diagnosis/Symptoms: PATIENT FELL AND FOUND BLEEDING ULCER    Length of stay (If applicable): 1 WEEK    Specialty Only: Did you see a [facility] health provider?    [] Yes  [x] No      PLEASE REACH OUT TO PATIENT FOR RESCHEDULING 8/22/23 APPOINTMENT.

## 2023-08-22 NOTE — TELEPHONE ENCOUNTER
08.22.23    Called and left a voicemail to return call to schedule this appointment.     Thanks,   Suzie

## 2023-09-15 ENCOUNTER — TELEPHONE (OUTPATIENT)
Dept: CARDIOLOGY | Facility: CLINIC | Age: 73
End: 2023-09-15
Payer: MEDICARE

## 2023-09-15 NOTE — TELEPHONE ENCOUNTER
Received a fax from [facility] requesting that patient hold Eliquis 1-2 days before having a EGD/Colonoscopy.     LOV: 7/17/23 with Chaparrita   NOV: 10/13/23 with Dr. Montenegro\\    Please advise.

## 2023-09-15 NOTE — TELEPHONE ENCOUNTER
Called and spoke to patient and he denies shortness of breath, swelling or chest pain.    Will send clearance. May hold apixaban for 48 hours prior to procedure    Shameka, can you please send clearance?

## 2023-10-24 ENCOUNTER — INPATIENT HOSPITAL (OUTPATIENT)
Dept: URBAN - METROPOLITAN AREA HOSPITAL 107 | Facility: HOSPITAL | Age: 73
End: 2023-10-24
Payer: MEDICARE

## 2023-10-24 DIAGNOSIS — D64.9 ANEMIA, UNSPECIFIED: ICD-10-CM

## 2023-10-24 PROCEDURE — 99232 SBSQ HOSP IP/OBS MODERATE 35: CPT | Performed by: PHYSICIAN ASSISTANT

## 2023-10-27 ENCOUNTER — INPATIENT HOSPITAL (OUTPATIENT)
Dept: URBAN - METROPOLITAN AREA HOSPITAL 107 | Facility: HOSPITAL | Age: 73
End: 2023-10-27
Payer: MEDICARE

## 2023-10-27 DIAGNOSIS — D64.9 ANEMIA, UNSPECIFIED: ICD-10-CM

## 2023-10-27 PROCEDURE — 99222 1ST HOSP IP/OBS MODERATE 55: CPT

## 2023-10-30 ENCOUNTER — INPATIENT HOSPITAL (OUTPATIENT)
Dept: URBAN - METROPOLITAN AREA HOSPITAL 107 | Facility: HOSPITAL | Age: 73
End: 2023-10-30
Payer: MEDICARE

## 2023-10-30 DIAGNOSIS — R19.5 OTHER FECAL ABNORMALITIES: ICD-10-CM

## 2023-10-30 DIAGNOSIS — D64.9 ANEMIA, UNSPECIFIED: ICD-10-CM

## 2023-10-30 PROCEDURE — 99233 SBSQ HOSP IP/OBS HIGH 50: CPT | Performed by: PHYSICIAN ASSISTANT

## 2024-01-05 RX ORDER — APIXABAN 5 MG/1
5 TABLET, FILM COATED ORAL 2 TIMES DAILY
Qty: 60 TABLET | Refills: 3 | Status: SHIPPED | OUTPATIENT
Start: 2024-01-05

## 2024-02-07 RX ORDER — DAPAGLIFLOZIN 10 MG/1
1 TABLET, FILM COATED ORAL DAILY
Qty: 30 TABLET | Refills: 3 | Status: SHIPPED | OUTPATIENT
Start: 2024-02-07

## 2024-02-08 NOTE — TELEPHONE ENCOUNTER
I tried calling patient but had to lvm. He needs to schedule an appointment with Dr. Montenegro or Silva. He was last seen in July.

## 2024-02-12 RX ORDER — DAPAGLIFLOZIN 10 MG/1
1 TABLET, FILM COATED ORAL DAILY
Qty: 30 TABLET | Refills: 3 | OUTPATIENT
Start: 2024-02-12

## 2024-06-25 RX ORDER — APIXABAN 5 MG/1
5 TABLET, FILM COATED ORAL 2 TIMES DAILY
Qty: 60 TABLET | Refills: 3 | Status: SHIPPED | OUTPATIENT
Start: 2024-06-25

## 2024-06-25 RX ORDER — DAPAGLIFLOZIN 10 MG/1
1 TABLET, FILM COATED ORAL DAILY
Qty: 30 TABLET | Refills: 3 | Status: SHIPPED | OUTPATIENT
Start: 2024-06-25

## (undated) DEVICE — HI-TORQUE SUPRA CORE .035 PERIPHERAL GUIDE WIRE .035 X 190 CM: Brand: HI-TORQUE SUPRA CORE

## (undated) DEVICE — TOWEL,OR,DSP,ST,BLUE,STD,4/PK,20PK/CS: Brand: MEDLINE

## (undated) DEVICE — KT MANIFLD CARDIAC

## (undated) DEVICE — CATH DIAG IMPULSE FL3.5 5F 100CM

## (undated) DEVICE — TAVR: Brand: MEDLINE INDUSTRIES, INC.

## (undated) DEVICE — HI-TORQUE SUPRA CORE .035 PERIPHERAL GUIDE WIRE .035 X 300 CM: Brand: HI-TORQUE SUPRA CORE

## (undated) DEVICE — TBG INJ CONTRL PVCCLR RIGD RA 1200PSI 10

## (undated) DEVICE — SUT SILK 2 SUTUPAK TIE 60IN SA8H 2STRAND

## (undated) DEVICE — DECANT BG O JET

## (undated) DEVICE — SUTURE REMOVAL TRAY: Brand: MEDLINE INDUSTRIES, INC.

## (undated) DEVICE — CATH DIAG IMPULSE FR4 5F 100CM

## (undated) DEVICE — SOL NACL 0.9PCT 1000ML

## (undated) DEVICE — GW EMR FIX EXCHG J STD .035 3MM 260CM

## (undated) DEVICE — ZINC CALCIUM ALGINATE DRESSING: Brand: KENDALL

## (undated) DEVICE — TRANSD PRESS STOPCOCK1WAY CABL48IN

## (undated) DEVICE — TRY INTRO PERC 6F

## (undated) DEVICE — GW INQWIRE FC PTFE STR .035IN 150

## (undated) DEVICE — DRSNG SURESITE WNDW 2.38X2.75

## (undated) DEVICE — INTRO SHEATH ART/FEM ENGAGE .035 8F12CM

## (undated) DEVICE — ANGIO-SEAL VIP VASCULAR CLOSURE DEVICE: Brand: ANGIO-SEAL

## (undated) DEVICE — CVR PROB 96IN LF STRL

## (undated) DEVICE — RADIFOCUS GLIDEWIRE: Brand: GLIDEWIRE

## (undated) DEVICE — FR5 INFINITI MULTIPAC: Brand: INFINITI

## (undated) DEVICE — CATH DIAG IMPULSE FR4 6F 100CM

## (undated) DEVICE — SUT SILK 0 CT1 CR8 18IN C021D

## (undated) DEVICE — INTRO SHEATH ART/FEM ENGAGE .035 5F12CM

## (undated) DEVICE — SOL ISO/ALC RUB 70PCT 4OZ

## (undated) DEVICE — PK CATH CARD 40

## (undated) DEVICE — HI-TORQUE BALANCE MIDDLEWEIGHT GUIDE WIRE .014 STRAIGHT TIP 3.0 CM X 190 CM: Brand: HI-TORQUE BALANCE MIDDLEWEIGHT

## (undated) DEVICE — Device

## (undated) DEVICE — SPK CONTRST MISER

## (undated) DEVICE — CATH DIAG IMPULSE AL1 6F 100CM

## (undated) DEVICE — TBG PRESS EXCITE INJ HPF1200 CLR 100IN

## (undated) DEVICE — CVR TABL BACK STND

## (undated) DEVICE — GW XCHG AMPLTZ XSTIF PTFE CRV .035 3X260

## (undated) DEVICE — CATH DIAG IMPULSE MPA2 SH 5F 100CM

## (undated) DEVICE — PERCLOSE PROGLIDE™ SUTURE-MEDIATED CLOSURE SYSTEM: Brand: PERCLOSE PROGLIDE™

## (undated) DEVICE — CATH VENT MIV RADL PIG ST TIP 4F 110CM

## (undated) DEVICE — PK PERFUS CUST W/CARDIOPLEGIA

## (undated) DEVICE — SOL IRR H2O BTL 1000ML STRL

## (undated) DEVICE — HEMOCONCENTRATOR PERFUS LPS06

## (undated) DEVICE — 3M™ BAIR HUGGER® UNDERBODY BLANKET, FULL ACCESS, 10 PER CASE 63500: Brand: BAIR HUGGER™

## (undated) DEVICE — GLIDESHEATH BASIC HYDROPHILIC COATED INTRODUCER SHEATH: Brand: GLIDESHEATH

## (undated) DEVICE — GLV SURG BIOGEL LTX PF 8

## (undated) DEVICE — STPCK 1WY STD/PRESS SWIVELNUT

## (undated) DEVICE — GLIDESHEATH SLENDER STAINLESS STEEL KIT: Brand: GLIDESHEATH SLENDER

## (undated) DEVICE — BALN PRESS WEDGE 5F 110CM

## (undated) DEVICE — CATH ELECTRD PACE TEMP BI NONHEP 5F110CM

## (undated) DEVICE — BIOPATCH™ ANTIMICROBIAL DRESSING WITH CHLORHEXIDINE GLUCONATE IS A HYDROPHILLIC POLYURETHANE ABSORPTIVE FOAM WITH CHLORHEXIDINE GLUCONATE (CHG) WHICH INHIBITS BACTERIAL GROWTH UNDER THE DRESSING. THE DRESSING IS INTENDED TO BE USED TO ABSORB EXUDATE, COVER A WOUND CAUSED BY VASCULAR AND NONVASCULAR PERCUTANEOUS MEDICAL DEVICES DURING SURGERY, AS WELL AS REDUCE LOCAL INFECTION AND COLONIZATION OF MICROORGANISMS.: Brand: BIOPATCH

## (undated) DEVICE — TBG ART PRESS 24 IN

## (undated) DEVICE — INTRO SHEATH ART/FEM ENGAGE .035 6F12CM